# Patient Record
Sex: MALE | Race: WHITE | NOT HISPANIC OR LATINO | Employment: FULL TIME | URBAN - METROPOLITAN AREA
[De-identification: names, ages, dates, MRNs, and addresses within clinical notes are randomized per-mention and may not be internally consistent; named-entity substitution may affect disease eponyms.]

---

## 2021-02-04 ENCOUNTER — APPOINTMENT (OUTPATIENT)
Dept: RADIOLOGY | Facility: MEDICAL CENTER | Age: 41
End: 2021-02-04
Attending: PHYSICIAN ASSISTANT
Payer: COMMERCIAL

## 2021-02-04 ENCOUNTER — OFFICE VISIT (OUTPATIENT)
Dept: URGENT CARE | Facility: MEDICAL CENTER | Age: 41
End: 2021-02-04
Payer: COMMERCIAL

## 2021-02-04 VITALS
RESPIRATION RATE: 18 BRPM | DIASTOLIC BLOOD PRESSURE: 114 MMHG | SYSTOLIC BLOOD PRESSURE: 174 MMHG | TEMPERATURE: 98 F | BODY MASS INDEX: 35.55 KG/M2 | WEIGHT: 240 LBS | HEIGHT: 69 IN | HEART RATE: 84 BPM | OXYGEN SATURATION: 98 %

## 2021-02-04 DIAGNOSIS — S99.922A FOOT INJURY, LEFT, INITIAL ENCOUNTER: ICD-10-CM

## 2021-02-04 DIAGNOSIS — S99.912A LEFT ANKLE INJURY, INITIAL ENCOUNTER: ICD-10-CM

## 2021-02-04 DIAGNOSIS — S82.832A CLOSED FRACTURE OF DISTAL END OF LEFT FIBULA, UNSPECIFIED FRACTURE MORPHOLOGY, INITIAL ENCOUNTER: Primary | ICD-10-CM

## 2021-02-04 PROCEDURE — 73630 X-RAY EXAM OF FOOT: CPT

## 2021-02-04 PROCEDURE — 29515 APPLICATION SHORT LEG SPLINT: CPT | Performed by: PHYSICIAN ASSISTANT

## 2021-02-04 PROCEDURE — 73610 X-RAY EXAM OF ANKLE: CPT

## 2021-02-04 PROCEDURE — 99213 OFFICE O/P EST LOW 20 MIN: CPT | Performed by: PHYSICIAN ASSISTANT

## 2021-02-04 NOTE — PATIENT INSTRUCTIONS
1  Apply ICE to the area 10-15min 3-4x daily  2  Motrin as needed for pain  3   Continue in splint and crutches until consult with Orthopedics

## 2021-02-04 NOTE — PROGRESS NOTES
[address] [facility]        NAME: Kit Helms is a 41 y o  male  : 1980    MRN: [id number]  DATE: 2021  TIME: 5:48 PM    Assessment and Plan   Closed fracture of distal end of left fibula, unspecified fracture morphology, initial encounter [H32 759A]  1  Closed fracture of distal end of left fibula, unspecified fracture morphology, initial encounter  XR ankle 3+ vw left    Ambulatory referral to Orthopedic Surgery   2  Foot injury, left, initial encounter  XR foot 3+ vw left         Patient Instructions     1  Apply ICE to the area 10-15min 3-4x daily  2  Motrin as needed for pain  3  Continue in splint and crutches until consult with Orthopedics         Chief Complaint     Chief Complaint   Patient presents with    Ankle Injury     Pt was shoveling, slipped and fell in his driveway twisting his left ankle and left foot 2 days ago   Foot Injury         History of Present Illness       Rashaun Colunga is a 43-year-old male who presents with pain and swelling of his left ankle after fall occurred 2 days prior  Patient reports he was shoveling snow when he slipped, twisting his left ankle  Patient does report hearing a pop in his left ankle and reports 7/10 pain to the area  Patient denies any numbness, tingling or weakness  He does have a prior history of a left ankle injury  Review of Systems   Review of Systems   Constitutional: Negative  HENT: Negative  Respiratory: Negative  Cardiovascular: Negative  Musculoskeletal: Positive for gait problem and joint swelling  Current Medications     No current outpatient medications on file      Current Allergies     Allergies as of 2021    (No Known Allergies)            The following portions of the patient's history were reviewed and updated as appropriate: allergies, current medications, past family history, past medical history, past social history, past surgical history and problem list      History reviewed  No pertinent past medical history  History reviewed  No pertinent surgical history  Family History   Problem Relation Age of Onset    Hypertension Mother     Hypertension Father          Medications have been verified  Objective   BP (!) 174/114   Pulse 84   Temp 98 °F (36 7 °C) (Temporal)   Resp 18   Ht 5' 9" (1 753 m)   Wt 109 kg (240 lb)   SpO2 98%   BMI 35 44 kg/m²   No LMP for male patient  Physical Exam     Physical Exam  Constitutional:       General: He is not in acute distress  Appearance: Normal appearance  He is not ill-appearing  Cardiovascular:      Rate and Rhythm: Normal rate and regular rhythm  Heart sounds: Normal heart sounds  No murmur  Pulmonary:      Effort: Pulmonary effort is normal       Breath sounds: Normal breath sounds  Musculoskeletal:        Feet:    Neurological:      Mental Status: He is alert  Splint application    Date/Time: 2/4/2021 5:48 PM  Performed by: Grayce Kawasaki, PA-C  Authorized by: Grayce Kawasaki, PA-C   Universal Protocol:  Consent: Verbal consent obtained  Consent given by: patient  Patient understanding: patient states understanding of the procedure being performed  Patient consent: the patient's understanding of the procedure matches consent given  Patient identity confirmed: verbally with patient      Pre-procedure details:     Sensation:  Normal  Procedure details:     Laterality:  Left    Location:  Ankle    Ankle:  L ankle    Splint type:  Short leg    Supplies:  Ortho-Glass, cotton padding and elastic bandage      Patient was fitted for crutches, crutch use reviewed with patient

## 2021-02-17 NOTE — TELEPHONE ENCOUNTER
Spoke to patient  Advised that he should follow the instructions given to him by the doctor he has already seen for this until he sees our doctor tomorrow  Verbalized understanding

## 2021-02-17 NOTE — TELEPHONE ENCOUNTER
Patient sees Dr Cheli Luo  Patient is calling because he is currently in a splint but has a boot from an njury he had in 2010   He would like to know if he can wear the boot instead or does he needs to wait until he sees the dr?        [place]: [phone]

## 2021-02-18 NOTE — TELEPHONE ENCOUNTER
email to admin    Good Morning,    I was contacted by patient Brenda Garcias ([phone]) who cancelled his morning appt with DR Westly Goldmann today due to the weather  Patient asked if he can be accommodated with DR Westly Goldmann for next Thursday 02 25  Patient has a new Minimally displaced fracture at the distal left fibula and wants to be seen in [facility]  Can you help accommodate an appropriate appt based on his injury?     # [phone]

## 2021-02-20 ENCOUNTER — HOSPITAL ENCOUNTER (OUTPATIENT)
Dept: RADIOLOGY | Facility: HOSPITAL | Age: 41
Discharge: HOME | End: 2021-02-20
Payer: COMMERCIAL

## 2021-02-20 VITALS
DIASTOLIC BLOOD PRESSURE: 115 MMHG | HEART RATE: 74 BPM | HEIGHT: 69 IN | SYSTOLIC BLOOD PRESSURE: 168 MMHG | BODY MASS INDEX: 35.44 KG/M2

## 2021-02-20 DIAGNOSIS — S82.832A CLOSED FRACTURE OF DISTAL END OF LEFT FIBULA, UNSPECIFIED FRACTURE MORPHOLOGY, INITIAL ENCOUNTER: ICD-10-CM

## 2021-02-20 DIAGNOSIS — S82.892A CLOSED FRACTURE OF LEFT ANKLE, INITIAL ENCOUNTER: Primary | ICD-10-CM

## 2021-02-20 DIAGNOSIS — S82.892A CLOSED FRACTURE OF LEFT ANKLE, INITIAL ENCOUNTER: ICD-10-CM

## 2021-02-20 PROCEDURE — 73610 X-RAY EXAM OF ANKLE: CPT

## 2021-02-20 PROCEDURE — 99203 OFFICE O/P NEW LOW 30 MIN: CPT | Performed by: PHYSICIAN ASSISTANT

## 2021-02-20 NOTE — PROGRESS NOTES
Assessment/Plan   Diagnoses and all orders for this visit:    Closed fracture of left ankle, initial encounter  - High CAM boot fitted and dispensed  - PWB on crutches  - Follow up with Dr Farshad Tam in a week          Subjective   Patient ID: Clinton Perry is a 41 y o  male  Vitals:    02/20/21 0753   BP: (!) 168/115   Pulse: 76     41yo male comes in for an evaluation of his left ankle  He was injured when he fell in the driveway on 8-3-88 while shoveling snow  Xrays in the ER demonstrated a distal fibular fracture  He was treated with a splint and crutches  The pain is located only on the outside of the ankle  The pain is dull in character, mild in severity, pain does not radiate and is not associated with numbness  Several years ago, he was in a CAM boot due to chronic pain from flat foot  He brought this with him today, but it doesn't fit  Incidentally noted is hypertension  He will call his PCP today  He understands and agrees to go to the ER immediately with any symptoms including chest pain, palpitations, sob, headache, dizziness, or blurred vision  The following portions of the patient's history were reviewed and updated as appropriate: allergies, current medications, past family history, past medical history, past social history, past surgical history and problem list     Review of Systems  Ortho Exam  History reviewed  No pertinent past medical history  History reviewed  No pertinent surgical history  Family History   Problem Relation Age of Onset    Hypertension Mother     Hypertension Father      Social History     Occupational History    Not on file   Tobacco Use    Smoking status: Never Smoker    Smokeless tobacco: Never Used   Substance and Sexual Activity    Alcohol use: Not on file    Drug use: Not on file    Sexual activity: Not on file       Review of Systems   Constitutional: Negative  HENT: Negative  Eyes: Negative  Respiratory: Negative  Cardiovascular: Negative  Gastrointestinal: Negative  Endocrine: Negative  Genitourinary: Negative  Musculoskeletal: As below      Allergic/Immunologic: Negative  Neurological: Negative  Hematological: Negative  Psychiatric/Behavioral: Negative  Objective   Physical Exam        I have personally reviewed pertinent films in PACS and my interpretation is unchanged from previous  Distal fibular fracture, isolated  Mortise intact  · Constitutional: Awake, Alert, Oriented  s  · Eyes: EOMI  · Psych: Mood and affect appropriate  · Heart: regular rate and rhythm  · Lungs: No audible wheezing  · Abdomen: soft  · Lymph: no lymphedema             left ankle:  - Appearance   Mild swelling  No discoloration, deformity, or ecchymosis  - Palpation   + tenderness of the lateral malleolus  Non-tender medial malleolus, deltoid, proximal fibula    Non-tender about the rest of the ankle and foot   - ROM   not examined due to fracture status  - Motor   limited by pain  - NVI distally

## 2021-02-26 ENCOUNTER — OFFICE VISIT (OUTPATIENT)
Dept: OBGYN CLINIC | Facility: CLINIC | Age: 41
End: 2021-02-26
Payer: COMMERCIAL

## 2021-02-26 ENCOUNTER — APPOINTMENT (OUTPATIENT)
Dept: RADIOLOGY | Facility: AMBULARY SURGERY CENTER | Age: 41
End: 2021-02-26
Attending: ORTHOPAEDIC SURGERY
Payer: COMMERCIAL

## 2021-02-26 VITALS — WEIGHT: 240 LBS | BODY MASS INDEX: 35.55 KG/M2 | HEIGHT: 69 IN

## 2021-02-26 DIAGNOSIS — S82.65XA NONDISPLACED FRACTURE OF LATERAL MALLEOLUS OF LEFT FIBULA, INITIAL ENCOUNTER FOR CLOSED FRACTURE: Primary | ICD-10-CM

## 2021-02-26 DIAGNOSIS — S82.65XA NONDISPLACED FRACTURE OF LATERAL MALLEOLUS OF LEFT FIBULA, INITIAL ENCOUNTER FOR CLOSED FRACTURE: ICD-10-CM

## 2021-02-26 PROCEDURE — 99204 OFFICE O/P NEW MOD 45 MIN: CPT | Performed by: ORTHOPAEDIC SURGERY

## 2021-02-26 PROCEDURE — 73610 X-RAY EXAM OF ANKLE: CPT

## 2021-02-26 NOTE — PATIENT INSTRUCTIONS
At this time, he should go back to partial weight bearing (50% of weight) in the cam boot with 1 crutch under the right arm until 3/5/21  After that week, he can transition to full weight bearing with no crutch for the next 2 weeks until 3/19/21  He can do epsom salt soaks for the swelling  Elevate and if can use ant-inflammatory for swelling ( like advil or mortrin, ibuprofen) A referral to formal physical therapy to work on ROM and strengthening which can start next week  We will follow up with the patient in 3 weeks with xrays

## 2021-02-26 NOTE — PROGRESS NOTES
Assessment:  1  Nondisplaced fracture of lateral malleolus of left fibula, initial encounter for closed fracture  XR ankle 3+ vw left    Ambulatory referral to Physical Therapy     There is no problem list on file for this patient  Plan        At this time, he should go back to partial weight bearing (50%) in the cam boot with 1 crutch under the right arm until 3/5/21  After that week, he can transition to full weight bearing with no crutch for the next 2 weeks until 3/19/21   He can do epsom salt soaks for the swelling  A referral to formal physical therapy to work on ROM and strengthening which can start next week  We will follow up with the patient in 3 weeks with xrays  Subjective:     Patient ID:    Chief Complaint:Philippebraulioamanda Rhysjose Johnston 41 y o  male      HPI    Patient comes in today  for consultation with regards to a left distal fibular fracture referred by Yu Castillo physician assistant  Patient states that he fell in his driveway and 1/1/8345 while shoveling snow  He was seen in the ED for evaluation and treatment  He was placed in a splint and on crutches  The patient reports that the pain is in the  Lateral aspect of the ankle and has been going on for since the injury  The pain is rated at3 at its best and6 at its worst   The pain is described as  Dull aching  It is worsened with  Going down stairs, and is made better with  In Cam boot  The patient has taken  Cam boot for treatment  He has had no need to take over-the-counter medications for his pain or discomfort        The following portions of the patient's history were reviewed and updated as appropriate: allergies, current medications, past family history, past social history, past surgical history and problem list         Social History     Socioeconomic History    Marital status: Single     Spouse name: Not on file    Number of children: Not on file    Years of education: Not on file    Highest education level: Not on file Occupational History    Not on file   Social Needs    Financial resource strain: Not on file    Food insecurity     Worry: Not on file     Inability: Not on file    Transportation needs     Medical: Not on file     Non-medical: Not on file   Tobacco Use    Smoking status: Never Smoker    Smokeless tobacco: Never Used   Substance and Sexual Activity    Alcohol use: Not on file    Drug use: Not on file    Sexual activity: Not on file   Lifestyle    Physical activity     Days per week: Not on file     Minutes per session: Not on file    Stress: Not on file   Relationships    Social connections     Talks on phone: Not on file     Gets together: Not on file     Attends Oriental orthodox service: Not on file     Active member of club or organization: Not on file     Attends meetings of clubs or organizations: Not on file     Relationship status: Not on file    Intimate partner violence     Fear of current or ex partner: Not on file     Emotionally abused: Not on file     Physically abused: Not on file     Forced sexual activity: Not on file   Other Topics Concern    Not on file   Social History Narrative    Not on file     No past medical history on file  No past surgical history on file  No Known Allergies  No current outpatient medications on file prior to visit  No current facility-administered medications on file prior to visit  Objective:    Review of Systems   Constitutional: Negative for chills, fever and unexpected weight change  HENT: Negative for hearing loss, nosebleeds and sore throat  Eyes: Negative for pain, redness and visual disturbance  Respiratory: Negative for cough, shortness of breath and wheezing  Cardiovascular: Negative for chest pain, palpitations and leg swelling  Gastrointestinal: Negative for abdominal pain, nausea and vomiting  Endocrine: Negative for polydipsia and polyuria  Genitourinary: Negative for dysuria and hematuria     Skin: Negative for rash and wound  Neurological: Negative for dizziness, light-headedness and headaches  Psychiatric/Behavioral: Negative for decreased concentration, dysphoric mood and suicidal ideas  The patient is not nervous/anxious  Left Ankle Exam     Tenderness   The patient is experiencing tenderness in the lateral malleolus, ATF and CF  Swelling: mild    Range of Motion   Dorsiflexion: 0   Plantar flexion: 20   Eversion: 5   Inversion: 5     Muscle Strength   Dorsiflexion:  5/5   Plantar flexion:  5/5   Posterior tibial:  4/5  Peroneal muscle:  4/5    Tests   Anterior drawer: negative    Other   Erythema: absent  Sensation: normal  Pulse: present    Comments:    Calf is soft and non tender    + 2 pitted edema over the dorsal aspect of the foot   1+ pitted edema over the lateral ankle            Physical Exam  Vitals signs reviewed  HENT:      Head: Normocephalic  Eyes:      Pupils: Pupils are equal, round, and reactive to light  Neck:      Musculoskeletal: Normal range of motion  Cardiovascular:      Rate and Rhythm: Normal rate  Pulses: Normal pulses  Skin:     General: Skin is warm and dry  Neurological:      Mental Status: He is alert and oriented to person, place, and time           Procedures  No Procedures performed today    I have personally reviewed pertinent films in PACS and my interpretation is X-rays of the left ankle were reviewed from 02/20/2021:     Manan Armas,:  Kaiden Resendiz am acting as a scribe while in the presence of the attending physician :       I,:  Neyda Kellogg, DO personally performed the services described in this documentation    as scribed in my presence :           Portions of the record may have been created with voice recognition software   Occasional wrong word or "sound a like" substitutions may have occurred due to the inherent limitations of voice recognition software   Read the chart carefully and recognize, using context, where substitutions have occurred

## 2021-03-02 ENCOUNTER — EVALUATION (OUTPATIENT)
Dept: PHYSICAL THERAPY | Age: 41
End: 2021-03-02
Payer: COMMERCIAL

## 2021-03-02 DIAGNOSIS — S82.65XD NONDISPLACED FRACTURE OF LATERAL MALLEOLUS OF LEFT FIBULA, SUBSEQUENT ENCOUNTER FOR CLOSED FRACTURE WITH ROUTINE HEALING: Primary | ICD-10-CM

## 2021-03-02 PROCEDURE — 97161 PT EVAL LOW COMPLEX 20 MIN: CPT | Performed by: PHYSICAL THERAPIST

## 2021-03-02 PROCEDURE — 97110 THERAPEUTIC EXERCISES: CPT | Performed by: PHYSICAL THERAPIST

## 2021-03-03 NOTE — PROGRESS NOTES
PT Evaluation     Today's date: 3/2/2021  Patient name: Sigifredo Dixon  : 1980  MRN: [id number]  Referring provider: Isabella Lara DO  Dx:   Encounter Diagnosis     ICD-10-CM    1  Nondisplaced fracture of lateral malleolus of left fibula, subsequent encounter for closed fracture with routine healing  S82 65XD Ambulatory referral to Physical Therapy       Start Time:   Stop Time:   Total time in clinic (min): 45 minutes    Assessment  Assessment details: Pt is a 42 y/o male who presents with left ankle pain following non-displaced lateral malleolar fx  No further referral is necessary at this time  Pt has pain, decreased strength, and ROM  Pt has a good outlook on PT and no PMH leading to a positive prognosis  Pt would benefit from PT to address these impairments leading to increased functional capacity and improved quality of life  Impairments: abnormal or restricted ROM, impaired physical strength, lacks appropriate home exercise program, pain with function, poor posture  and poor body mechanics  Understanding of Dx/Px/POC: good   Prognosis: good    Goals  Short Term Goals: to be achieved by 4 weeks  1) Patient to be independent with basic HEP  2) Decrease pain to 1/10 at its worst   3) Increase DF ROM by 5-10 degrees   4) Increase LE strength by 1/2 MMT grade in all deficient planes      Long Term Goals: to be achieved by discharge  1) FOTO equal to or greater than 70   2) Ambulation to improve to maximal level of function  3) Stair negotiation will improve to reciprocal   4) Sit to stand transfers will improve to maximal level of function     Plan  Patient would benefit from: skilled physical therapy  Planned modality interventions: cryotherapy and thermotherapy: hydrocollator packs  Planned therapy interventions: neuromuscular re-education, patient education, stretching, strengthening, therapeutic activities, therapeutic exercise, therapeutic training, home exercise program and graded activity  Frequency: Twice a week for 8 weeks  Treatment plan discussed with: patient        Subjective Evaluation    History of Present Illness  Mechanism of injury: trauma  Mechanism of injury: Pt slipped on ice in February and fractured lateral malleolus  Detailed return to weight bearing program is outlined in Dr Nova Bahena note  Pt would like to return to walking 2 miles  Pt has no significant PMH  Quality of life: good    Pain  Current pain ratin  At best pain ratin  At worst pain ratin  Quality: dull ache  Aggravating factors: stair climbing, walking and standing    Hand dominance: right    Patient Goals  Patient goals for therapy: decreased pain, independence with ADLs/IADLs, return to sport/leisure activities, increased strength and increased motion          Objective     General Comments: Ankle/Foot Comments   Left Ankle Exam      Tenderness   The patient is experiencing tenderness in the lateral malleolus, ATF and CF  Swelling: mild     Range of Motion   Dorsiflexion: 0   Plantar flexion: 20   Eversion: 5   Inversion: 5      Muscle Strength   Dorsiflexion:  4-/5   Plantar flexion:  5/5   Posterior tibial:  4-/5  Peroneal muscle:  4-/5     Tests   Anterior drawer: negative     Other   Erythema: absent  Sensation: normal  Pulse: present     Comments:    Calf is soft and non tender     + 2 pitted edema over the dorsal aspect of the foot   1+ pitted edema over the lateral ankle    Gait:  Patient is able to ambulate household distances with CAM and no AD         Flowsheet Rows      Most Recent Value   PT/OT G-Codes   Current Score  51   Projected Score  72             Precautions: N/A      Manuals                                                                 Neuro Re-Ed                                                                                                        Ther Ex 3/2            Ankle pumps HEP            Toe crunches HEP            DF stretch HEP Ther Activity                                       Gait Training                                       Modalities

## 2021-03-09 ENCOUNTER — OFFICE VISIT (OUTPATIENT)
Dept: PHYSICAL THERAPY | Age: 41
End: 2021-03-09
Payer: COMMERCIAL

## 2021-03-09 DIAGNOSIS — S82.65XD NONDISPLACED FRACTURE OF LATERAL MALLEOLUS OF LEFT FIBULA, SUBSEQUENT ENCOUNTER FOR CLOSED FRACTURE WITH ROUTINE HEALING: Primary | ICD-10-CM

## 2021-03-09 PROCEDURE — 97140 MANUAL THERAPY 1/> REGIONS: CPT | Performed by: PHYSICAL THERAPIST

## 2021-03-09 PROCEDURE — 97110 THERAPEUTIC EXERCISES: CPT | Performed by: PHYSICAL THERAPIST

## 2021-03-10 NOTE — PROGRESS NOTES
Daily Note     Today's date: 3/9/2021  Patient name: Kraig Gomez  : 1980  MRN: [id number]  Referring provider: Ti Mathis DO  Dx:   Encounter Diagnosis     ICD-10-CM    1  Nondisplaced fracture of lateral malleolus of left fibula, subsequent encounter for closed fracture with routine healing  S82 65XD        Start Time: 1800  Stop Time: 1850  Total time in clinic (min): 50 minutes    Subjective: Pt reports improvements with symptoms  Objective: See treatment diary below      Assessment: Tolerated treatment well  Patient demonstrated fatigue post treatment and would benefit from continued PT      Plan: Continue per plan of care        Precautions: N/A      Manuals 3/9            Manual edema  JF            PROM JF                                      Neuro Re-Ed                                                                                                        Ther Ex 3/9            Ankle pumps 3*10            Toe crunches 3*10            DF stretch 20*10"            SLR 3x prone supine  + SL 3*10                                                                Ther Activity                                       Gait Training                                       Modalities

## 2021-03-16 ENCOUNTER — OFFICE VISIT (OUTPATIENT)
Dept: PHYSICAL THERAPY | Age: 41
End: 2021-03-16
Payer: COMMERCIAL

## 2021-03-16 DIAGNOSIS — S82.65XD NONDISPLACED FRACTURE OF LATERAL MALLEOLUS OF LEFT FIBULA, SUBSEQUENT ENCOUNTER FOR CLOSED FRACTURE WITH ROUTINE HEALING: Primary | ICD-10-CM

## 2021-03-16 PROCEDURE — 97140 MANUAL THERAPY 1/> REGIONS: CPT | Performed by: PHYSICAL THERAPIST

## 2021-03-16 PROCEDURE — 97112 NEUROMUSCULAR REEDUCATION: CPT | Performed by: PHYSICAL THERAPIST

## 2021-03-16 PROCEDURE — 97110 THERAPEUTIC EXERCISES: CPT | Performed by: PHYSICAL THERAPIST

## 2021-03-16 NOTE — PROGRESS NOTES
Daily Note     Today's date: 3/16/2021  Patient name: Kathya Jean  : 1980  MRN: [id number]  Referring provider: Shanda Cornejo DO  Dx:   Encounter Diagnosis     ICD-10-CM    1  Nondisplaced fracture of lateral malleolus of left fibula, subsequent encounter for closed fracture with routine healing  S82 65XD        Start Time: 1800  Stop Time: 1845  Total time in clinic (min): 45 minutes    Subjective: Pt reports improvements in symptoms  Objective: See treatment diary below      Assessment: Tolerated treatment well  Pt demonstrates great improvements in ROM and has near 15-20 degrees~ of  PROM/AROM DF and nearly full PF  4-/5 MMT with  Inv/Ev and DF  Pt's demonstrates ability to affectively manage swelling and appropriate gait without AD in CAM boot  Patient demonstrated fatigue post treatment and would benefit from continued PT      Plan: Continue per plan of care        Precautions: N/A      Manuals 3/9 3/16           Manual edema  JF JF           PROM JF JF                                     Neuro Re-Ed  3/16           Mini squats  3*10           Standing hip abd  2*10 b/l           STS  2x10                                                               Ther Ex 3/9 3/16           Ankle pumps 3*10 3*10           Toe crunches 3*10            DF stretch 20*10" 20*10"           SLR 3x prone supine  + SL 3*10 3*10                                                               Ther Activity                                       Gait Training                                       Modalities

## 2021-03-19 ENCOUNTER — OFFICE VISIT (OUTPATIENT)
Dept: OBGYN CLINIC | Facility: CLINIC | Age: 41
End: 2021-03-19
Payer: COMMERCIAL

## 2021-03-19 ENCOUNTER — APPOINTMENT (OUTPATIENT)
Dept: RADIOLOGY | Facility: AMBULARY SURGERY CENTER | Age: 41
End: 2021-03-19
Attending: ORTHOPAEDIC SURGERY
Payer: COMMERCIAL

## 2021-03-19 VITALS
HEIGHT: 69 IN | WEIGHT: 240 LBS | HEART RATE: 72 BPM | BODY MASS INDEX: 35.55 KG/M2 | DIASTOLIC BLOOD PRESSURE: 116 MMHG | SYSTOLIC BLOOD PRESSURE: 167 MMHG

## 2021-03-19 DIAGNOSIS — S82.65XA NONDISPLACED FRACTURE OF LATERAL MALLEOLUS OF LEFT FIBULA, INITIAL ENCOUNTER FOR CLOSED FRACTURE: ICD-10-CM

## 2021-03-19 DIAGNOSIS — S82.62XD CLOSED LOW LATERAL MALLEOLUS FRACTURE, LEFT, WITH ROUTINE HEALING, SUBSEQUENT ENCOUNTER: Primary | ICD-10-CM

## 2021-03-19 PROCEDURE — 99213 OFFICE O/P EST LOW 20 MIN: CPT | Performed by: ORTHOPAEDIC SURGERY

## 2021-03-19 PROCEDURE — 73610 X-RAY EXAM OF ANKLE: CPT

## 2021-03-19 NOTE — PROGRESS NOTES
Assessment/Plan:  1  Closed low lateral malleolus fracture, left, with routine healing, subsequent encounter  XR ankle 3+ vw left     Patient Active Problem List   Diagnosis    Closed low lateral malleolus fracture, left, with routine healing, subsequent encounter       Discussion/Summary:    41 y o  male With healing [place] a fracture of the left  Lateral malleolus  Patient will be transitioned out of the Cam boot into normal footwear  He will continue physical therapy  Recommended elevation and Epsom salt soaks for swelling as needed  Follow-up in 4 weeks for repeat x-ray of the left ankle      The assessment and plan were formulated by Dr Sotero Trevino and I assisted in carrying it out  Subjective:   Patient ID: Hedy Valente is a 41 y o  male   HPI    Patient presents to the office for follow up of  Left distal fibula fracture of lateral malleolus  Since the last visit, the patient reports  No significant pain in the left ankle  Has some mild discomfort that is intermittent over the lateral ankle  Denies any radiating pain  No significant pain with weight-bearing  He has been using the cam walker boot as instructed  Denies any numbness or tingling  Patient  denies any new injuries to the  Left ankle since the last visit       The following portions of the patient's history were reviewed and updated as appropriate: allergies, current medications, past family history, past social history, past surgical history and problem list     Social History     Socioeconomic History    Marital status: Single     Spouse name: Not on file    Number of children: Not on file    Years of education: Not on file    Highest education level: Not on file   Occupational History    Not on file   Social Needs    Financial resource strain: Not on file    Food insecurity     Worry: Not on file     Inability: Not on file    Transportation needs     Medical: Not on file     Non-medical: Not on file   Tobacco Use    Smoking status: Never Smoker    Smokeless tobacco: Never Used   Substance and Sexual Activity    Alcohol use: Not on file    Drug use: Not on file    Sexual activity: Not on file   Lifestyle    Physical activity     Days per week: Not on file     Minutes per session: Not on file    Stress: Not on file   Relationships    Social connections     Talks on phone: Not on file     Gets together: Not on file     Attends Anglican service: Not on file     Active member of club or organization: Not on file     Attends meetings of clubs or organizations: Not on file     Relationship status: Not on file    Intimate partner violence     Fear of current or ex partner: Not on file     Emotionally abused: Not on file     Physically abused: Not on file     Forced sexual activity: Not on file   Other Topics Concern    Not on file   Social History Narrative    Not on file     History reviewed  No pertinent past medical history  History reviewed  No pertinent surgical history  No Known Allergies  No current outpatient medications on file prior to visit  No current facility-administered medications on file prior to visit  Review of Systems   negative except as noted in HPI    Objective:    Vitals:    03/19/21 1308   BP: (!) 167/116   Pulse: 72       Physical Exam  Constitutional:       General: He is not in acute distress  Appearance: He is well-developed  HENT:      Head: Normocephalic and atraumatic  Eyes:      General: No scleral icterus  Conjunctiva/sclera: Conjunctivae normal    Neck:      Musculoskeletal: Neck supple  Trachea: No tracheal deviation  Cardiovascular:      Comments: No discernible arrhthymias   Pulmonary:      Effort: Pulmonary effort is normal  No respiratory distress  Skin:     General: Skin is warm and dry  Neurological:      Mental Status: He is alert     Psychiatric:         Behavior: Behavior normal          Left Ankle Exam     Tenderness   The patient is experiencing no tenderness  Swelling: moderate    Range of Motion   Dorsiflexion: normal   Plantar flexion: normal   Eversion: abnormal   Inversion: abnormal     Muscle Strength   Left ankle normal muscle strength: FHL and EHL 5/5  Dorsiflexion:  5/5   Plantar flexion:  5/5   Anterior tibial:  5/5   Posterior tibial:  5/5  Gastrocsoleus:  5/5  Peroneal muscle:  5/5    Tests   Anterior drawer: negative    Other   Erythema: absent  Scars: absent  Sensation: normal  Left ankle pulse absent: 2+ DP pulse  I have personally reviewed pertinent films in PACS and my interpretation is  x-ray left ankle performed today demonstrates stable alignment of nondisplaced lateral malleolus fracture with evidence of interval callus formation seen moderate soft tissue swelling  Procedures  No Procedures performed today    Portions of the record may have been created with voice recognition software  Occasional wrong word or "sound a like" substitutions may have occurred due to the inherent limitations of voice recognition software  Read the chart carefully and recognize, using context, where substitutions have occurred

## 2021-03-23 ENCOUNTER — OFFICE VISIT (OUTPATIENT)
Dept: PHYSICAL THERAPY | Age: 41
End: 2021-03-23
Payer: COMMERCIAL

## 2021-03-23 DIAGNOSIS — S82.65XD NONDISPLACED FRACTURE OF LATERAL MALLEOLUS OF LEFT FIBULA, SUBSEQUENT ENCOUNTER FOR CLOSED FRACTURE WITH ROUTINE HEALING: Primary | ICD-10-CM

## 2021-03-23 PROCEDURE — 97140 MANUAL THERAPY 1/> REGIONS: CPT | Performed by: PHYSICAL THERAPIST

## 2021-03-23 PROCEDURE — 97112 NEUROMUSCULAR REEDUCATION: CPT | Performed by: PHYSICAL THERAPIST

## 2021-03-23 PROCEDURE — 97110 THERAPEUTIC EXERCISES: CPT | Performed by: PHYSICAL THERAPIST

## 2021-03-23 NOTE — PROGRESS NOTES
Daily Note     Today's date: 3/23/2021  Patient name: Kenya Walsh  : 1980  MRN: [id number]  Referring provider: Lake Blum DO  Dx:   Encounter Diagnosis     ICD-10-CM    1  Nondisplaced fracture of lateral malleolus of left fibula, subsequent encounter for closed fracture with routine healing  S82 65XD        Start Time: 1800  Stop Time: 1845  Total time in clinic (min): 45 minutes    Subjective: Pt reports improvements      Objective: See treatment diary below      Assessment: Tolerated treatment well  Patient demonstrated fatigue post treatment and would benefit from continued PT      Plan: Continue per plan of care        Precautions: N/A      Manuals 3/9 3/23           Manual edema  JF JF           PROM JF JF                                     Neuro Re-Ed  3/23           Mini squats  3*10           Standing hip abd  2*10 b/l           STS  2x10                                                               Ther Ex 3/9 3/23           Ankle pumps 3*10            Toe crunches 3*10            DF stretch 20*10" 20*10"           SLR 3x prone supine  + SL 3*10 3*10                                                               Ther Activity                                       Gait Training  3/23           TM  5'           Weight shifts  5'           Modalities

## 2021-03-29 ENCOUNTER — OFFICE VISIT (OUTPATIENT)
Dept: PHYSICAL THERAPY | Age: 41
End: 2021-03-29
Payer: COMMERCIAL

## 2021-03-29 DIAGNOSIS — S82.65XD NONDISPLACED FRACTURE OF LATERAL MALLEOLUS OF LEFT FIBULA, SUBSEQUENT ENCOUNTER FOR CLOSED FRACTURE WITH ROUTINE HEALING: Primary | ICD-10-CM

## 2021-03-29 PROCEDURE — 97110 THERAPEUTIC EXERCISES: CPT | Performed by: PHYSICAL THERAPIST

## 2021-03-29 PROCEDURE — 97112 NEUROMUSCULAR REEDUCATION: CPT | Performed by: PHYSICAL THERAPIST

## 2021-03-29 PROCEDURE — 97140 MANUAL THERAPY 1/> REGIONS: CPT | Performed by: PHYSICAL THERAPIST

## 2021-03-29 NOTE — PROGRESS NOTES
Daily Note     Today's date: 3/29/2021  Patient name: Kusum Madrigal  : 1980  MRN: [id number]  Referring provider: Elva Lantigua DO  Dx:   Encounter Diagnosis     ICD-10-CM    1  Nondisplaced fracture of lateral malleolus of left fibula, subsequent encounter for closed fracture with routine healing  S82 65XD        Start Time: 1800  Stop Time: 1845  Total time in clinic (min): 45 minutes    Subjective: Pt reports wearing sneaker 80-90% of the day  Objective: See treatment diary below      Assessment: Tolerated treatment well  Pt continues to progress favorably  Patient demonstrated fatigue post treatment and would benefit from continued PT      Plan: Continue per plan of care        Precautions: N/A      Manuals 3/9 3/23 3/29          Manual edema  JF JF JF          PROM JF JF JF                                    Neuro Re-Ed  3/23 3/29          Mini squats  3*10 3*10          Standing hip abd  2*10 b/l 2*10          STS  2x10           Heel toe raises   2*10          TB 4 way   30x ea                                    Ther Ex 3/9 3/23 3/29          Ankle pumps 3*10            Toe crunches 3*10            DF stretch 20*10" 20*10" standing 20x10"          SLR 3x prone supine  + SL 3*10 3*10           Upright bike   8'                                                 Ther Activity                                       Gait Training  3/23           TM  5'           Weight shifts  5'           Modalities

## 2021-04-05 ENCOUNTER — OFFICE VISIT (OUTPATIENT)
Dept: PHYSICAL THERAPY | Age: 41
End: 2021-04-05
Payer: COMMERCIAL

## 2021-04-05 DIAGNOSIS — S82.65XD NONDISPLACED FRACTURE OF LATERAL MALLEOLUS OF LEFT FIBULA, SUBSEQUENT ENCOUNTER FOR CLOSED FRACTURE WITH ROUTINE HEALING: Primary | ICD-10-CM

## 2021-04-05 PROCEDURE — 97112 NEUROMUSCULAR REEDUCATION: CPT | Performed by: PHYSICAL THERAPIST

## 2021-04-05 PROCEDURE — 97140 MANUAL THERAPY 1/> REGIONS: CPT | Performed by: PHYSICAL THERAPIST

## 2021-04-05 PROCEDURE — 97110 THERAPEUTIC EXERCISES: CPT | Performed by: PHYSICAL THERAPIST

## 2021-04-05 NOTE — PROGRESS NOTES
Daily Note     Today's date: 2021  Patient name: Juan Belle  : 1980  MRN: [id number]  Referring provider: Foster Gonzales DO  Dx:   Encounter Diagnosis     ICD-10-CM    1  Nondisplaced fracture of lateral malleolus of left fibula, subsequent encounter for closed fracture with routine healing  S82 65XD        Start Time: 1800  Stop Time: 1845  Total time in clinic (min): 45 minutes    Subjective: Pt reports improvements with symptoms  Objective: See treatment diary below      Assessment: Tolerated treatment well  Pt's POC was progressed to include more SLS  Patient demonstrated fatigue post treatment and would benefit from continued PT      Plan: Continue per plan of care        Precautions: N/A      Manuals 3/9 3/23 4          Manual edema  JF JF JF          PROM JF JF JF                                    Neuro Re-Ed  3/23 45          Mini squats  3*10 3*10          Standing hip abd  2*10 b/l 2*10          STS  2x10           Heel toe raises   3*10          TB 4 way   30x ea                                    Ther Ex 3/9 3/23 4/5          Ankle pumps 3*10            Toe crunches 3*10            DF stretch 20*10" 20*10" standing 20x10"          SLR 3x prone supine  + SL 3*10 3*10           Upright bike   8'                                                 Ther Activity                                       Gait Training  3/23           TM  5'           Weight shifts  5'           Modalities

## 2021-04-12 ENCOUNTER — APPOINTMENT (OUTPATIENT)
Dept: PHYSICAL THERAPY | Age: 41
End: 2021-04-12
Payer: COMMERCIAL

## 2021-04-15 ENCOUNTER — OFFICE VISIT (OUTPATIENT)
Dept: PHYSICAL THERAPY | Age: 41
End: 2021-04-15
Payer: COMMERCIAL

## 2021-04-15 DIAGNOSIS — S82.65XD NONDISPLACED FRACTURE OF LATERAL MALLEOLUS OF LEFT FIBULA, SUBSEQUENT ENCOUNTER FOR CLOSED FRACTURE WITH ROUTINE HEALING: Primary | ICD-10-CM

## 2021-04-15 PROCEDURE — 97112 NEUROMUSCULAR REEDUCATION: CPT | Performed by: PHYSICAL THERAPIST

## 2021-04-15 PROCEDURE — 97110 THERAPEUTIC EXERCISES: CPT | Performed by: PHYSICAL THERAPIST

## 2021-04-15 PROCEDURE — 97140 MANUAL THERAPY 1/> REGIONS: CPT | Performed by: PHYSICAL THERAPIST

## 2021-04-15 NOTE — PROGRESS NOTES
Floor PT Discharge Summary      Today's date: 4/15/2021  Patient name: Carmelo Simms  : 1980  MRN: [id number]  Referring provider: Altagracia Anthony DO  Dx:   Encounter Diagnosis     ICD-10-CM    1  Nondisplaced fracture of lateral malleolus of left fibula, subsequent encounter for closed fracture with routine healing  S82 65XD Ambulatory referral to Physical Therapy       Start Time: 1800  Stop Time: 1845  Total time in clinic (min): 45 minutes    Assessment  Assessment details: Pt is a 40 y/o male who presents with left ankle pain following non-displaced lateral malleolar fx  Pt has met all functional goals  He reports being able to walk over a mile easily and demonstrates no difficulty with stair navigation  Pt does demonstrate low-moderate grade swelling and was educated on swelling management techniques  Pt has a 50 dollar co-pay and elects to perform a HEP which I think it appropriate at this point in his recovery  Pt would benefit from PT to address these impairments leading to increased functional capacity and improved quality of life  Impairments: abnormal or restricted ROM, impaired physical strength, lacks appropriate home exercise program, pain with function, poor posture  and poor body mechanics  Understanding of Dx/Px/POC: good   Prognosis: good    Goals  Short Term Goals: to be achieved by 4 weeks MET   1) Patient to be independent with basic HEP  2) Decrease pain to 1/10 at its worst   3) Increase DF ROM by 5-10 degrees   4) Increase LE strength by 1/2 MMT grade in all deficient planes      Long Term Goals: to be achieved by discharge MET  1) FOTO equal to or greater than 70   2) Ambulation to improve to maximal level of function  3) Stair negotiation will improve to reciprocal   4) Sit to stand transfers will improve to maximal level of function     Plan  Patient would benefit from: D/C       Subjective Evaluation    History of Present Illness  Mechanism of injury: trauma  Mechanism of injury: Pt slipped on ice in February and fractured lateral malleolus  Detailed return to weight bearing program is outlined in Dr Tam Oas note  Pt would like to return to walking 2 miles  Pt has no significant PMH  Quality of life: good    Pain  Current pain ratin  At best pain ratin  At worst pain ratin  Quality: dull ache  Aggravating factors: stair climbing, walking and standing    Hand dominance: right    Patient Goals  Patient goals for therapy: decreased pain, independence with ADLs/IADLs, return to sport/leisure activities, increased strength and increased motion          Objective     General Comments: Ankle/Foot Comments   Left Ankle Exam      Tenderness   The patient is experiencing tenderness in the lateral malleolus, ATF and CF     Swelling: mild     Range of Motion   Dorsiflexion: 15  Plantar flexion: 40  Eversion: 10  Inversion: 20     Muscle Strength   Dorsiflexion: 5/5   Plantar flexion:  5/5   Posterior tibial:  4+/5  Peroneal muscle:  4+/5       Flowsheet Rows      Most Recent Value   PT/OT G-Codes   Current Score  77   Projected Score  72             Precautions: N/A      Manuals 3/9 3/23 4/15          Manual edema  JF JF JF          PROM JF JF JF                                    Neuro Re-Ed  3/23 4/15          Mini squats  3*10 3*10          Standing hip abd  2*10 b/l 2*10          STS  2x10           Heel toe raises   3*10          TB 4 way   30x ea                                    Ther Ex 3/9 3/23 4/15          Ankle pumps 3*10            Toe crunches 3*10            DF stretch 20*10" 20*10" standing 20x10"          SLR 3x prone supine  + SL 3*10 3*10           Upright bike   8'                                                 Ther Activity                                       Gait Training  3/23           TM  5'           Weight shifts  5'           Modalities

## 2021-04-23 ENCOUNTER — OFFICE VISIT (OUTPATIENT)
Dept: OBGYN CLINIC | Facility: CLINIC | Age: 41
End: 2021-04-23
Payer: COMMERCIAL

## 2021-04-23 ENCOUNTER — APPOINTMENT (OUTPATIENT)
Dept: RADIOLOGY | Facility: AMBULARY SURGERY CENTER | Age: 41
End: 2021-04-23
Attending: ORTHOPAEDIC SURGERY
Payer: COMMERCIAL

## 2021-04-23 VITALS
DIASTOLIC BLOOD PRESSURE: 104 MMHG | SYSTOLIC BLOOD PRESSURE: 169 MMHG | WEIGHT: 240 LBS | HEIGHT: 69 IN | HEART RATE: 70 BPM | BODY MASS INDEX: 35.55 KG/M2

## 2021-04-23 DIAGNOSIS — S82.62XD CLOSED LOW LATERAL MALLEOLUS FRACTURE, LEFT, WITH ROUTINE HEALING, SUBSEQUENT ENCOUNTER: ICD-10-CM

## 2021-04-23 DIAGNOSIS — S82.62XD CLOSED LOW LATERAL MALLEOLUS FRACTURE, LEFT, WITH ROUTINE HEALING, SUBSEQUENT ENCOUNTER: Primary | ICD-10-CM

## 2021-04-23 PROCEDURE — 73610 X-RAY EXAM OF ANKLE: CPT

## 2021-04-23 PROCEDURE — 99212 OFFICE O/P EST SF 10 MIN: CPT | Performed by: ORTHOPAEDIC SURGERY

## 2021-04-23 NOTE — PROGRESS NOTES
Assessment:  1  Closed low lateral malleolus fracture, left, with routine healing, subsequent encounter  XR ankle 3+ vw left     Patient Active Problem List   Diagnosis    Closed low lateral malleolus fracture, left, with routine healing, subsequent encounter           Plan       activity as tolerated no follow-up necessary            Subjective:     Patient ID:    Chief Complaint:Russell Dover 41 y o  male      HPI     patient comes in today with regards to lateral malleolus fracture    Patient had the injury back in February he has been doing physical therapy he reports no pain he reports that his ankle feels back to normal      The following portions of the patient's history were reviewed and updated as appropriate: allergies, current medications, past family history, past social history, past surgical history and problem list     All organ systems normal    Social History     Socioeconomic History    Marital status: Single     Spouse name: Not on file    Number of children: Not on file    Years of education: Not on file    Highest education level: Not on file   Occupational History    Not on file   Social Needs    Financial resource strain: Not on file    Food insecurity     Worry: Not on file     Inability: Not on file    Transportation needs     Medical: Not on file     Non-medical: Not on file   Tobacco Use    Smoking status: Never Smoker    Smokeless tobacco: Never Used   Substance and Sexual Activity    Alcohol use: Not on file    Drug use: Not on file    Sexual activity: Not on file   Lifestyle    Physical activity     Days per week: Not on file     Minutes per session: Not on file    Stress: Not on file   Relationships    Social connections     Talks on phone: Not on file     Gets together: Not on file     Attends Confucianism service: Not on file     Active member of club or organization: Not on file     Attends meetings of clubs or organizations: Not on file     Relationship status: Not on file    Intimate partner violence     Fear of current or ex partner: Not on file     Emotionally abused: Not on file     Physically abused: Not on file     Forced sexual activity: Not on file   Other Topics Concern    Not on file   Social History Narrative    Not on file     No past medical history on file  No past surgical history on file  No Known Allergies  No current outpatient medications on file prior to visit  No current facility-administered medications on file prior to visit  Objective:        Ortho Exam   full range of motion normal strength and balance    I have personally reviewed pertinent films in PACS and my interpretation is Excellent healing barely visible fracture line  Portions of the record may have been created with voice recognition software   Occasional wrong word or "sound a like" substitutions may have occurred due to the inherent limitations of voice recognition software   Read the chart carefully and recognize, using context, where substitutions have occurred

## 2021-12-10 ENCOUNTER — OFFICE VISIT (OUTPATIENT)
Dept: INTERNAL MEDICINE CLINIC | Age: 41
End: 2021-12-10
Payer: COMMERCIAL

## 2021-12-10 VITALS
DIASTOLIC BLOOD PRESSURE: 110 MMHG | TEMPERATURE: 98.7 F | BODY MASS INDEX: 36.68 KG/M2 | HEART RATE: 76 BPM | OXYGEN SATURATION: 97 % | WEIGHT: 262 LBS | HEIGHT: 71 IN | SYSTOLIC BLOOD PRESSURE: 162 MMHG

## 2021-12-10 DIAGNOSIS — Z23 NEED FOR INFLUENZA VACCINATION: ICD-10-CM

## 2021-12-10 DIAGNOSIS — R35.89 POLYURIA: ICD-10-CM

## 2021-12-10 DIAGNOSIS — Z83.3 FAMILY HISTORY OF DIABETES MELLITUS (DM): ICD-10-CM

## 2021-12-10 DIAGNOSIS — Z00.00 ANNUAL PHYSICAL EXAM: ICD-10-CM

## 2021-12-10 DIAGNOSIS — I10 UNCONTROLLED STAGE 2 HYPERTENSION: Primary | ICD-10-CM

## 2021-12-10 DIAGNOSIS — R63.1 POLYDIPSIA: ICD-10-CM

## 2021-12-10 PROCEDURE — 3725F SCREEN DEPRESSION PERFORMED: CPT | Performed by: INTERNAL MEDICINE

## 2021-12-10 PROCEDURE — 90686 IIV4 VACC NO PRSV 0.5 ML IM: CPT

## 2021-12-10 PROCEDURE — 99203 OFFICE O/P NEW LOW 30 MIN: CPT | Performed by: INTERNAL MEDICINE

## 2021-12-10 PROCEDURE — 3008F BODY MASS INDEX DOCD: CPT | Performed by: INTERNAL MEDICINE

## 2021-12-10 PROCEDURE — 90471 IMMUNIZATION ADMIN: CPT

## 2021-12-10 RX ORDER — LOSARTAN POTASSIUM 50 MG/1
50 TABLET ORAL DAILY
Qty: 60 TABLET | Refills: 2 | Status: SHIPPED | OUTPATIENT
Start: 2021-12-10 | End: 2022-01-17

## 2022-01-17 ENCOUNTER — APPOINTMENT (OUTPATIENT)
Dept: LAB | Age: 42
End: 2022-01-17
Payer: COMMERCIAL

## 2022-01-17 ENCOUNTER — OFFICE VISIT (OUTPATIENT)
Dept: INTERNAL MEDICINE CLINIC | Age: 42
End: 2022-01-17
Payer: COMMERCIAL

## 2022-01-17 VITALS
DIASTOLIC BLOOD PRESSURE: 90 MMHG | HEIGHT: 69 IN | WEIGHT: 260.6 LBS | SYSTOLIC BLOOD PRESSURE: 132 MMHG | BODY MASS INDEX: 38.6 KG/M2 | OXYGEN SATURATION: 96 % | TEMPERATURE: 98.7 F | HEART RATE: 99 BPM

## 2022-01-17 DIAGNOSIS — I10 UNCONTROLLED STAGE 2 HYPERTENSION: ICD-10-CM

## 2022-01-17 DIAGNOSIS — Z00.00 ANNUAL PHYSICAL EXAM: ICD-10-CM

## 2022-01-17 DIAGNOSIS — Z83.3 FAMILY HISTORY OF DIABETES MELLITUS (DM): ICD-10-CM

## 2022-01-17 DIAGNOSIS — R35.89 POLYURIA: ICD-10-CM

## 2022-01-17 DIAGNOSIS — Z76.89 ENCOUNTER TO ESTABLISH CARE: Primary | ICD-10-CM

## 2022-01-17 DIAGNOSIS — R63.1 POLYDIPSIA: ICD-10-CM

## 2022-01-17 LAB
ALBUMIN SERPL BCP-MCNC: 3.9 G/DL (ref 3.5–5)
ALP SERPL-CCNC: 81 U/L (ref 46–116)
ALT SERPL W P-5'-P-CCNC: 115 U/L (ref 12–78)
ANION GAP SERPL CALCULATED.3IONS-SCNC: 4 MMOL/L (ref 4–13)
AST SERPL W P-5'-P-CCNC: 50 U/L (ref 5–45)
BACTERIA UR QL AUTO: ABNORMAL /HPF
BASOPHILS # BLD AUTO: 0.07 THOUSANDS/ΜL (ref 0–0.1)
BASOPHILS NFR BLD AUTO: 1 % (ref 0–1)
BILIRUB SERPL-MCNC: 0.71 MG/DL (ref 0.2–1)
BILIRUB UR QL STRIP: NEGATIVE
BUN SERPL-MCNC: 14 MG/DL (ref 5–25)
CALCIUM SERPL-MCNC: 9.7 MG/DL (ref 8.3–10.1)
CAOX CRY URNS QL MICRO: ABNORMAL /HPF
CHLORIDE SERPL-SCNC: 108 MMOL/L (ref 100–108)
CHOLEST SERPL-MCNC: 208 MG/DL
CLARITY UR: CLEAR
CO2 SERPL-SCNC: 26 MMOL/L (ref 21–32)
COLOR UR: YELLOW
CREAT SERPL-MCNC: 0.99 MG/DL (ref 0.6–1.3)
EOSINOPHIL # BLD AUTO: 0.19 THOUSAND/ΜL (ref 0–0.61)
EOSINOPHIL NFR BLD AUTO: 2 % (ref 0–6)
ERYTHROCYTE [DISTWIDTH] IN BLOOD BY AUTOMATED COUNT: 13.5 % (ref 11.6–15.1)
EST. AVERAGE GLUCOSE BLD GHB EST-MCNC: 123 MG/DL
FINE GRAN CASTS URNS QL MICRO: ABNORMAL /LPF
GFR SERPL CREATININE-BSD FRML MDRD: 92 ML/MIN/1.73SQ M
GLUCOSE SERPL-MCNC: 101 MG/DL (ref 65–140)
GLUCOSE UR STRIP-MCNC: NEGATIVE MG/DL
HBA1C MFR BLD: 5.9 %
HCT VFR BLD AUTO: 46.9 % (ref 36.5–49.3)
HDLC SERPL-MCNC: 51 MG/DL
HGB BLD-MCNC: 15.1 G/DL (ref 12–17)
HGB UR QL STRIP.AUTO: ABNORMAL
HYALINE CASTS #/AREA URNS LPF: ABNORMAL /LPF
IMM GRANULOCYTES # BLD AUTO: 0.15 THOUSAND/UL (ref 0–0.2)
IMM GRANULOCYTES NFR BLD AUTO: 1 % (ref 0–2)
KETONES UR STRIP-MCNC: NEGATIVE MG/DL
LDLC SERPL CALC-MCNC: 138 MG/DL (ref 0–100)
LEUKOCYTE ESTERASE UR QL STRIP: NEGATIVE
LYMPHOCYTES # BLD AUTO: 1.82 THOUSANDS/ΜL (ref 0.6–4.47)
LYMPHOCYTES NFR BLD AUTO: 17 % (ref 14–44)
MCH RBC QN AUTO: 30.6 PG (ref 26.8–34.3)
MCHC RBC AUTO-ENTMCNC: 32.2 G/DL (ref 31.4–37.4)
MCV RBC AUTO: 95 FL (ref 82–98)
MONOCYTES # BLD AUTO: 0.88 THOUSAND/ΜL (ref 0.17–1.22)
MONOCYTES NFR BLD AUTO: 8 % (ref 4–12)
MUCOUS THREADS UR QL AUTO: ABNORMAL
NEUTROPHILS # BLD AUTO: 7.37 THOUSANDS/ΜL (ref 1.85–7.62)
NEUTS SEG NFR BLD AUTO: 71 % (ref 43–75)
NITRITE UR QL STRIP: NEGATIVE
NON-SQ EPI CELLS URNS QL MICRO: ABNORMAL /HPF
NONHDLC SERPL-MCNC: 157 MG/DL
NRBC BLD AUTO-RTO: 0 /100 WBCS
PH UR STRIP.AUTO: 5.5 [PH]
PLATELET # BLD AUTO: 289 THOUSANDS/UL (ref 149–390)
PMV BLD AUTO: 10.3 FL (ref 8.9–12.7)
POTASSIUM SERPL-SCNC: 4.1 MMOL/L (ref 3.5–5.3)
PROT SERPL-MCNC: 8.7 G/DL (ref 6.4–8.2)
PROT UR STRIP-MCNC: ABNORMAL MG/DL
RBC # BLD AUTO: 4.93 MILLION/UL (ref 3.88–5.62)
RBC #/AREA URNS AUTO: ABNORMAL /HPF
RBC CASTS URNS QL MICRO: ABNORMAL /LPF
SODIUM SERPL-SCNC: 138 MMOL/L (ref 136–145)
SP GR UR STRIP.AUTO: >=1.03 (ref 1–1.03)
TRIGL SERPL-MCNC: 93 MG/DL
TSH SERPL DL<=0.05 MIU/L-ACNC: 2.85 UIU/ML (ref 0.36–3.74)
UROBILINOGEN UR QL STRIP.AUTO: 0.2 E.U./DL
WBC # BLD AUTO: 10.48 THOUSAND/UL (ref 4.31–10.16)
WBC #/AREA URNS AUTO: ABNORMAL /HPF

## 2022-01-17 PROCEDURE — 3080F DIAST BP >= 90 MM HG: CPT | Performed by: INTERNAL MEDICINE

## 2022-01-17 PROCEDURE — 3008F BODY MASS INDEX DOCD: CPT | Performed by: INTERNAL MEDICINE

## 2022-01-17 PROCEDURE — 3075F SYST BP GE 130 - 139MM HG: CPT | Performed by: INTERNAL MEDICINE

## 2022-01-17 PROCEDURE — 83036 HEMOGLOBIN GLYCOSYLATED A1C: CPT

## 2022-01-17 PROCEDURE — 99396 PREV VISIT EST AGE 40-64: CPT | Performed by: INTERNAL MEDICINE

## 2022-01-17 PROCEDURE — 80061 LIPID PANEL: CPT

## 2022-01-17 PROCEDURE — 84443 ASSAY THYROID STIM HORMONE: CPT

## 2022-01-17 PROCEDURE — 85025 COMPLETE CBC W/AUTO DIFF WBC: CPT

## 2022-01-17 PROCEDURE — 80053 COMPREHEN METABOLIC PANEL: CPT

## 2022-01-17 PROCEDURE — 36415 COLL VENOUS BLD VENIPUNCTURE: CPT

## 2022-01-17 PROCEDURE — 1036F TOBACCO NON-USER: CPT | Performed by: INTERNAL MEDICINE

## 2022-01-17 PROCEDURE — 81001 URINALYSIS AUTO W/SCOPE: CPT

## 2022-01-17 RX ORDER — LOSARTAN POTASSIUM 50 MG/1
100 TABLET ORAL DAILY
Qty: 60 TABLET | Refills: 2 | Status: SHIPPED | OUTPATIENT
Start: 2022-01-17 | End: 2022-06-17 | Stop reason: SDUPTHER

## 2022-01-17 NOTE — PATIENT INSTRUCTIONS
PLEASE CHECK YOUR BLOOD PRESSURE TWICE A DAY, IN THE MORNING AND IN THE EVENING AND KEEP A BLOOD PRESSURE LOG  GOAL BLOOD PRESSURE /80 AND BELOW  PLEASE CALL THE OFFICE IF BLOOD PRESSURE IS CONSISTENTLY ABOVE 130/80  Wellness Visit for Adults   AMBULATORY CARE:   A wellness visit  is when you see your healthcare provider to get screened for health problems  Your healthcare provider will also give you advice on how to stay healthy  Write down your questions so you remember to ask them  Ask your healthcare provider how often you should have a wellness visit  What happens at a wellness visit:  Your healthcare provider will ask about your health, and your family history of health problems  This includes high blood pressure, heart disease, and cancer  He or she will ask if you have symptoms that concern you, if you smoke, and about your mood  You may also be asked about your intake of medicines, supplements, food, and alcohol  Any of the following may be done:  · Your weight  will be checked  Your height may also be checked so your body mass index (BMI) can be calculated  Your BMI shows if you are at a healthy weight  · Your blood pressure  and heart rate will be checked  Your temperature may also be checked  · Blood and urine tests  may be done  Blood tests may be done to check your cholesterol levels  Abnormal cholesterol levels increase your risk for heart disease and stroke  You may also need a blood or urine test to check for diabetes if you are at increased risk  Urine tests may be done to look for signs of an infection or kidney disease  · A physical exam  includes checking your heartbeat and lungs with a stethoscope  Your healthcare provider may also check your skin to look for sun damage  · Screening tests  may be recommended  A screening test is done to check for diseases that may not cause symptoms   The screening tests you may need depend on your age, gender, family history, and lifestyle habits  For example, colorectal screening may be recommended if you are 48years old or older  Screening tests you need if you are a woman:   · A Pap smear  is used to screen for cervical cancer  Pap smears are usually done every 3 to 5 years depending on your age  You may need them more often if you have had abnormal Pap smear test results in the past  Ask your healthcare provider how often you should have a Pap smear  · A mammogram  is an x-ray of your breasts to screen for breast cancer  Experts recommend mammograms every 2 years starting at age 48 years  You may need a mammogram at age 52 years or younger if you have an increased risk for breast cancer  Talk to your healthcare provider about when you should start having mammograms and how often you need them  Vaccines you may need:   · Get an influenza vaccine  every year  The influenza vaccine protects you from the flu  Several types of viruses cause the flu  The viruses change over time, so new vaccines are made each year  · Get a tetanus-diphtheria (Td) booster vaccine  every 10 years  This vaccine protects you against tetanus and diphtheria  Tetanus is a severe infection that may cause painful muscle spasms and lockjaw  Diphtheria is a severe bacterial infection that causes a thick covering in the back of your mouth and throat  · Get a human papillomavirus (HPV) vaccine  if you are female and aged 23 to 32 or male 23 to 24 and never received it  This vaccine protects you from HPV infection  HPV is the most common infection spread by sexual contact  HPV may also cause vaginal, penile, and anal cancers  · Get a pneumococcal vaccine  if you are aged 72 years or older  The pneumococcal vaccine is an injection given to protect you from pneumococcal disease  Pneumococcal disease is an infection caused by pneumococcal bacteria  The infection may cause pneumonia, meningitis, or an ear infection      · Get a shingles vaccine  if you are 60 or older, even if you have had shingles before  The shingles vaccine is an injection to protect you from the varicella-zoster virus  This is the same virus that causes chickenpox  Shingles is a painful rash that develops in people who had chickenpox or have been exposed to the virus  How to eat healthy:  My Plate is a model for planning healthy meals  It shows the types and amounts of foods that should go on your plate  Fruits and vegetables make up about half of your plate, and grains and protein make up the other half  A serving of dairy is included on the side of your plate  The amount of calories and serving sizes you need depends on your age, gender, weight, and height  Examples of healthy foods are listed below:  · Eat a variety of vegetables  such as dark green, red, and orange vegetables  You can also include canned vegetables low in sodium (salt) and frozen vegetables without added butter or sauces  · Eat a variety of fresh fruits , canned fruit in 100% juice, frozen fruit, and dried fruit  · Include whole grains  At least half of the grains you eat should be whole grains  Examples include whole-wheat bread, wheat pasta, brown rice, and whole-grain cereals such as oatmeal     · Eat a variety of protein foods such as seafood (fish and shellfish), lean meat, and poultry without skin (turkey and chicken)  Examples of lean meats include pork leg, shoulder, or tenderloin, and beef round, sirloin, tenderloin, and extra lean ground beef  Other protein foods include eggs and egg substitutes, beans, peas, soy products, nuts, and seeds  · Choose low-fat dairy products such as skim or 1% milk or low-fat yogurt, cheese, and cottage cheese  · Limit unhealthy fats  such as butter, hard margarine, and shortening  Exercise:  Exercise at least 30 minutes per day on most days of the week  Some examples of exercise include walking, biking, dancing, and swimming   You can also fit in more physical activity by taking the stairs instead of the elevator or parking farther away from stores  Include muscle strengthening activities 2 days each week  Regular exercise provides many health benefits  It helps you manage your weight, and decreases your risk for type 2 diabetes, heart disease, stroke, and high blood pressure  Exercise can also help improve your mood  Ask your healthcare provider about the best exercise plan for you  General health and safety guidelines:   · Do not smoke  Nicotine and other chemicals in cigarettes and cigars can cause lung damage  Ask your healthcare provider for information if you currently smoke and need help to quit  E-cigarettes or smokeless tobacco still contain nicotine  Talk to your healthcare provider before you use these products  · Limit alcohol  A drink of alcohol is 12 ounces of beer, 5 ounces of wine, or 1½ ounces of liquor  · Lose weight, if needed  Being overweight increases your risk of certain health conditions  These include heart disease, high blood pressure, type 2 diabetes, and certain types of cancer  · Protect your skin  Do not sunbathe or use tanning beds  Use sunscreen with a SPF 15 or higher  Apply sunscreen at least 15 minutes before you go outside  Reapply sunscreen every 2 hours  Wear protective clothing, hats, and sunglasses when you are outside  · Drive safely  Always wear your seatbelt  Make sure everyone in your car wears a seatbelt  A seatbelt can save your life if you are in an accident  Do not use your cell phone when you are driving  This could distract you and cause an accident  Pull over if you need to make a call or send a text message  · Practice safe sex  Use latex condoms if are sexually active and have more than one partner  Your healthcare provider may recommend screening tests for sexually transmitted infections (STIs)  · Wear helmets, lifejackets, and protective gear    Always wear a helmet when you ride a bike or motorcycle, go skiing, or play sports that could cause a head injury  Wear protective equipment when you play sports  Wear a lifejacket when you are on a boat or doing water sports  © Copyright [employer] 2021 Information is for End User's use only and may not be sold, redistributed or otherwise used for commercial purposes  All illustrations and images included in CareNotes® are the copyrighted property of [employer] Inc  or [facility] Caren Davey   The above information is an  only  It is not intended as medical advice for individual conditions or treatments  Talk to your doctor, nurse or pharmacist before following any medical regimen to see if it is safe and effective for you

## 2022-01-17 NOTE — PROGRESS NOTES
Assessment/Plan:    Annual physical exam  - History and physical examination done  - Pt was counseled to eat a heart healthy diet, to drink at least 2 L of water daily, to take a daily multivitamin and to exercise for at least 30 minutes of cardio exercise daily, for at least 5 days a week  - CBC, CMP, TSH and lipid panel were ordered at a previous visit and the forms were printed for patient today and he was encouraged to get the labs done  -   He got his two COVID shots and flu shot but is not sure of his last tetanus shot   - follow up in  Three months       Encounter to establish care  - Patient has established care with our practice and will sign for a release of his medical records from his previous PCP  -follow-up in 3 months      Essential hypertension   - blood pressure is improving but not optimally controlled  -patient was counseled to check his blood pressure twice a day, in the morning and in the evening and to keep a blood pressure log and bring it into his next visit  He was encouraged to call the office if his blood pressure is consistently above 130/80   -Will increase his losartan from  mg daily  -patient was encouraged to get his lab work done including his CMP, TSH and urinalysis  -continue with a low-salt diet and with exercise       Diagnoses and all orders for this visit:    Encounter to establish care    Annual physical exam    Uncontrolled stage 2 hypertension  -     losartan (COZAAR) 50 mg tablet; Take 2 tablets (100 mg total) by mouth daily          Subjective:      Patient ID: Juan Belle is a 35 y o  male  HPI  Patient presents to establish care      Last primary care physician- did not really see doctors much    Past medical history- no known health issures    Past surgical history- none    Medications- none prior to losartan    Allergies - NKDA    Alcohol use - none in over a year    Nicotine use - never    Recreational drug use - never    Work- payroll specialist    Health maintenance- cant remember    Immunization- got the two covid shots, flu shot, cant remember last tetanus    Family history-  htn - parents  Dm - dad,   Cancer ? Primary - dad    Today, patient has no complaints  He states that he has not been checking his blood pressure but he feels like his blood pressure is much better  He denies fever, chills, night sweats, headache, dizziness, chest pain, shortness of breath, palpitations, cough, nausea, vomiting, abdominal pain, diarrhea, constipation, myalgias, arthralgias, feelings of anxiety or depression  Patient presents for an annual physical exam     Last annual physical exam-  Cannot remember    Past medical history- none prior to  Recent diagnosis of hypertension but he  did not really see doctors in the past     Past surgical history- none    Medications- losartan    Allergies- no known drug allergies    Diet- mixture of balanced diet and junk food, drinks about a couple of glasses of water daily,     Exercise- not recently, used to take walks    Alcohol use-  Caffeine and soda use- no coffee or tea , soda - 4-5 cups daily    Nicotine use- never    Recreational drug use- never    Work-     Sexual history, STD history and HIV testing- not sexually active for years, std hx - never,  hiv testing - never    Gynecological history/Prostate health/testicular health history- occasionally hesitancy    Colonoscopy- N/A    Immunization history- got the two covid shots, flu shot, cant remember last tetanus    Dental visit- every six months usually    Vision- myopia - glasses    Family history-  htn - parents  Dm - dad,   Cancer ? Primary - dad      Today, patient has no complaints  Has not been checking his blood pressure but he feels like his blood pressure is much better    He denies fever, chills, night sweats, headache, dizziness, chest pain, shortness of breath, palpitations, cough, nausea, vomiting, abdominal pain, diarrhea, constipation, myalgias, arthralgias, feelings of anxiety or depression        The following portions of the patient's history were reviewed and updated as appropriate:   He  has no past medical history on file  He   Patient Active Problem List    Diagnosis Date Noted    [facility] [place] [facility] 01/17/2022    Uncontrolled stage 2 hypertension 12/10/2021    Annual physical exam 12/10/2021    BMI 36 0-36 9,adult 12/10/2021    Polydipsia 12/10/2021    Polyuria 12/10/2021    Closed low lateral malleolus fracture, left, with routine healing, subsequent encounter 03/19/2021     He  has no past surgical history on file  His family history includes Hypertension in his father and mother  He  reports that he has never smoked  He has never used smokeless tobacco  He reports current alcohol use  He reports that he does not use drugs  Current Outpatient Medications   Medication Sig Dispense Refill    losartan (COZAAR) 50 mg tablet Take 2 tablets (100 mg total) by mouth daily 60 tablet 2     No current facility-administered medications for this visit  Current Outpatient Medications on File Prior to Visit   Medication Sig    [DISCONTINUED] losartan (COZAAR) 50 mg tablet Take 1 tablet (50 mg total) by mouth daily     No current facility-administered medications on file prior to visit  He has No Known Allergies       Review of Systems   Constitutional: Negative for activity change, chills, fatigue, fever and unexpected weight change  HENT: Negative for ear pain, postnasal drip, rhinorrhea, sinus pressure and sore throat  Eyes: Negative for pain  Respiratory: Negative for cough, choking, chest tightness, shortness of breath and wheezing  Cardiovascular: Negative for chest pain, palpitations and leg swelling  Gastrointestinal: Negative for abdominal pain, constipation, diarrhea, nausea and vomiting  Genitourinary: Negative for dysuria and hematuria     Musculoskeletal: Negative for arthralgias, back pain, gait problem, joint swelling, myalgias and neck stiffness  Skin: Negative for pallor and rash  Neurological: Negative for dizziness, tremors, seizures, syncope, light-headedness and headaches  Hematological: Negative for adenopathy  Psychiatric/Behavioral: Negative for behavioral problems  Objective:      /90 (BP Location: Left arm, Patient Position: Sitting, Cuff Size: Large)   Pulse 99   Temp 98 7 °F (37 1 °C) (Temporal)   Ht 5' 9 09" (1 755 m)   Wt 118 kg (260 lb 9 6 oz)   SpO2 96%   BMI 38 38 kg/m²          Physical Exam  Constitutional:       General: He is not in acute distress  Appearance: He is well-developed  He is obese  He is not diaphoretic  Comments:  BMI is 38 38   HENT:      Head: Normocephalic and atraumatic  Right Ear: External ear normal       Left Ear: External ear normal       Nose: Nose normal       Mouth/Throat:      Pharynx: Posterior oropharyngeal erythema ( dry mucous membranes with mild oropharyngeal erythema and Mallampati class 3 oropharynx) present  No oropharyngeal exudate  Eyes:      General: No scleral icterus  Right eye: No discharge  Left eye: No discharge  Conjunctiva/sclera: Conjunctivae normal       Pupils: Pupils are equal, round, and reactive to light  Neck:      Thyroid: No thyromegaly  Vascular: No JVD  Trachea: No tracheal deviation  Cardiovascular:      Rate and Rhythm: Normal rate and regular rhythm  Heart sounds: Normal heart sounds  No murmur heard  No friction rub  No gallop  Pulmonary:      Effort: Pulmonary effort is normal  No respiratory distress  Breath sounds: Normal breath sounds  No wheezing or rales  Chest:      Chest wall: No tenderness  Abdominal:      General: Bowel sounds are normal  There is no distension  Palpations: Abdomen is soft  There is no mass  Tenderness: There is no abdominal tenderness  There is no guarding or rebound  Musculoskeletal:         General: No tenderness or deformity  Normal range of motion  Cervical back: Normal range of motion and neck supple  Lymphadenopathy:      Cervical: No cervical adenopathy  Skin:     General: Skin is warm and dry  Coloration: Skin is not pale  Findings: No erythema or rash  Neurological:      Mental Status: He is alert and oriented to person, place, and time  Cranial Nerves: No cranial nerve deficit  Motor: No abnormal muscle tone  Coordination: Coordination normal       Deep Tendon Reflexes: Reflexes are normal and symmetric  Comments: Cranial nerves 2-12 are intact bilaterally  Muscle strength is 5/5 in all extremities  Sensation is intact in bilateral face and extremities  Rapid alternating movement and finger-to-nose pointing test intact   Deep tendon reflexes are 2+ bilaterally  Gait is intact       Psychiatric:         Behavior: Behavior normal            No visits with results within 12 Month(s) from this visit  Latest known visit with results is:   No results found for any previous visit

## 2022-01-20 ENCOUNTER — TELEPHONE (OUTPATIENT)
Dept: INTERNAL MEDICINE CLINIC | Age: 42
End: 2022-01-20

## 2022-01-20 NOTE — TELEPHONE ENCOUNTER
I called and left patient a message on his voicemail to call the office regarding the results of his lab tests  He has some protein in his urine, his hemoglobin A1c is mildly elevated, and has some elevated lipids

## 2022-02-03 ENCOUNTER — TELEPHONE (OUTPATIENT)
Dept: INTERNAL MEDICINE CLINIC | Age: 42
End: 2022-02-03

## 2022-02-03 ENCOUNTER — TELEPHONE (OUTPATIENT)
Dept: OTHER | Facility: HOSPITAL | Age: 42
End: 2022-02-03

## 2022-02-03 DIAGNOSIS — R73.03 PREDIABETES: ICD-10-CM

## 2022-02-03 DIAGNOSIS — R74.01 TRANSAMINITIS: Primary | ICD-10-CM

## 2022-02-03 DIAGNOSIS — R77.9 ELEVATED SERUM PROTEIN LEVEL: ICD-10-CM

## 2022-02-03 DIAGNOSIS — R80.8 OTHER PROTEINURIA: ICD-10-CM

## 2022-02-03 NOTE — TELEPHONE ENCOUNTER
The 10-year ASCVD risk score (Alla Choi et al , 2013) is: 2 1%    Values used to calculate the score:      Age: 35 years      Sex: Male      Is Non- : No      Diabetic: No      Tobacco smoker: No      Systolic Blood Pressure: 678 mmHg      Is BP treated: Yes      HDL Cholesterol: 51 mg/dL      Total Cholesterol: 208 mg/dL    I called and discussed pt's lab results with him  He has proteinuria, transaminitis and elevated lipids  He was counseled to diet and exercise and we will recheck his urinalysis and CMP prior to his next visit  I will order the labs and send a message to the clinical staff to mail it to him  He expressed his understanding and agreement with the plan

## 2022-02-03 NOTE — TELEPHONE ENCOUNTER
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

## 2022-04-22 ENCOUNTER — APPOINTMENT (OUTPATIENT)
Dept: LAB | Age: 42
End: 2022-04-22
Payer: COMMERCIAL

## 2022-04-22 ENCOUNTER — OFFICE VISIT (OUTPATIENT)
Dept: INTERNAL MEDICINE CLINIC | Age: 42
End: 2022-04-22
Payer: COMMERCIAL

## 2022-04-22 VITALS
HEIGHT: 69 IN | DIASTOLIC BLOOD PRESSURE: 92 MMHG | BODY MASS INDEX: 39.83 KG/M2 | WEIGHT: 268.9 LBS | TEMPERATURE: 97.5 F | HEART RATE: 77 BPM | SYSTOLIC BLOOD PRESSURE: 130 MMHG | OXYGEN SATURATION: 98 %

## 2022-04-22 DIAGNOSIS — R82.998 CRYSTALLURIA: ICD-10-CM

## 2022-04-22 DIAGNOSIS — R73.03 PREDIABETES: ICD-10-CM

## 2022-04-22 DIAGNOSIS — R80.8 OTHER PROTEINURIA: ICD-10-CM

## 2022-04-22 DIAGNOSIS — R74.01 TRANSAMINITIS: ICD-10-CM

## 2022-04-22 DIAGNOSIS — R31.29 OTHER MICROSCOPIC HEMATURIA: ICD-10-CM

## 2022-04-22 DIAGNOSIS — I10 ESSENTIAL HYPERTENSION: Primary | ICD-10-CM

## 2022-04-22 DIAGNOSIS — I10 ESSENTIAL HYPERTENSION: ICD-10-CM

## 2022-04-22 DIAGNOSIS — E78.49 OTHER HYPERLIPIDEMIA: ICD-10-CM

## 2022-04-22 PROBLEM — Z76.89 ENCOUNTER TO ESTABLISH CARE: Status: RESOLVED | Noted: 2022-01-17 | Resolved: 2022-04-22

## 2022-04-22 LAB
ALBUMIN SERPL BCP-MCNC: 3.6 G/DL (ref 3.5–5)
ALP SERPL-CCNC: 80 U/L (ref 46–116)
ALT SERPL W P-5'-P-CCNC: 122 U/L (ref 12–78)
ANION GAP SERPL CALCULATED.3IONS-SCNC: 4 MMOL/L (ref 4–13)
AST SERPL W P-5'-P-CCNC: 53 U/L (ref 5–45)
BACTERIA UR QL AUTO: ABNORMAL /HPF
BILIRUB SERPL-MCNC: 0.49 MG/DL (ref 0.2–1)
BILIRUB UR QL STRIP: NEGATIVE
BUN SERPL-MCNC: 12 MG/DL (ref 5–25)
CALCIUM SERPL-MCNC: 9.5 MG/DL (ref 8.3–10.1)
CHLORIDE SERPL-SCNC: 109 MMOL/L (ref 100–108)
CLARITY UR: ABNORMAL
CO2 SERPL-SCNC: 28 MMOL/L (ref 21–32)
COLOR UR: ABNORMAL
CREAT SERPL-MCNC: 0.78 MG/DL (ref 0.6–1.3)
GFR SERPL CREATININE-BSD FRML MDRD: 110 ML/MIN/1.73SQ M
GLUCOSE P FAST SERPL-MCNC: 112 MG/DL (ref 65–99)
GLUCOSE UR STRIP-MCNC: NEGATIVE MG/DL
HBV CORE AB SER QL: NORMAL
HBV CORE IGM SER QL: NORMAL
HBV SURFACE AG SER QL: NORMAL
HCV AB SER QL: NORMAL
HGB UR QL STRIP.AUTO: ABNORMAL
KETONES UR STRIP-MCNC: NEGATIVE MG/DL
LEUKOCYTE ESTERASE UR QL STRIP: NEGATIVE
MUCOUS THREADS UR QL AUTO: ABNORMAL
NITRITE UR QL STRIP: NEGATIVE
NON-SQ EPI CELLS URNS QL MICRO: ABNORMAL /HPF
PH UR STRIP.AUTO: 5.5 [PH]
POTASSIUM SERPL-SCNC: 4.3 MMOL/L (ref 3.5–5.3)
PROT SERPL-MCNC: 8.2 G/DL (ref 6.4–8.2)
PROT UR STRIP-MCNC: ABNORMAL MG/DL
RBC #/AREA URNS AUTO: ABNORMAL /HPF
SODIUM SERPL-SCNC: 141 MMOL/L (ref 136–145)
SP GR UR STRIP.AUTO: 1.02 (ref 1–1.03)
UROBILINOGEN UR STRIP-ACNC: <2 MG/DL
WBC #/AREA URNS AUTO: ABNORMAL /HPF

## 2022-04-22 PROCEDURE — 86803 HEPATITIS C AB TEST: CPT

## 2022-04-22 PROCEDURE — 3008F BODY MASS INDEX DOCD: CPT | Performed by: INTERNAL MEDICINE

## 2022-04-22 PROCEDURE — 87340 HEPATITIS B SURFACE AG IA: CPT

## 2022-04-22 PROCEDURE — 81001 URINALYSIS AUTO W/SCOPE: CPT

## 2022-04-22 PROCEDURE — 36415 COLL VENOUS BLD VENIPUNCTURE: CPT

## 2022-04-22 PROCEDURE — 86704 HEP B CORE ANTIBODY TOTAL: CPT

## 2022-04-22 PROCEDURE — 86705 HEP B CORE ANTIBODY IGM: CPT

## 2022-04-22 PROCEDURE — 3080F DIAST BP >= 90 MM HG: CPT | Performed by: INTERNAL MEDICINE

## 2022-04-22 PROCEDURE — 99214 OFFICE O/P EST MOD 30 MIN: CPT | Performed by: INTERNAL MEDICINE

## 2022-04-22 PROCEDURE — 3725F SCREEN DEPRESSION PERFORMED: CPT | Performed by: INTERNAL MEDICINE

## 2022-04-22 PROCEDURE — 3075F SYST BP GE 130 - 139MM HG: CPT | Performed by: INTERNAL MEDICINE

## 2022-04-22 PROCEDURE — 1036F TOBACCO NON-USER: CPT | Performed by: INTERNAL MEDICINE

## 2022-04-22 PROCEDURE — 80053 COMPREHEN METABOLIC PANEL: CPT

## 2022-04-22 RX ORDER — AMLODIPINE BESYLATE 5 MG/1
5 TABLET ORAL DAILY
Qty: 90 TABLET | Refills: 1 | Status: SHIPPED | OUTPATIENT
Start: 2022-04-22 | End: 2022-07-27 | Stop reason: DRUGHIGH

## 2022-04-22 NOTE — PROGRESS NOTES
Assessment/Plan:    Essential hypertension  - not optimally controlled  - will start patient on amlodipine 5 mg daily  - continue losartan at 100 mg daily   - he was counseled to report if he develops any side effects to the new medication  -continue with a low-salt diet  -follow-up in 3 months    Prediabetes  - hemoglobin A1c done on January 17th, 2022 was 5 9   - he was counseled to eat a diet low in carbohydrates and simple sugars  -continue with exercise    Hyperlipidemia  -lipid panel done on January 17th, 2022 showed a total cholesterol of 208, triglyceride of 93, HDL of 51 and LDL of 138  - calculated 10 year ASCVD risk is 2%   - patient was counseled to eat a heart healthy diet and exercise  -will recheck a lipid panel at a later date    Transaminitis  - CMP done on January 17th, 2022 showed elevated AST and ALT at 50 and 115 respectively   - will check a chronic hepatitis panel  -will recheck a CMP     Microscopic hematuria  -urinalysis done on January 17th, 2022 showed  Small blood with proteinuria of 1+, with calcium oxalate crystals   - patient was counseled to try to keep well hydrated  -will recheck a urinalysis later    Crystalluria   - patient had calcium oxalate crystals in his urine   -he was counseled to drink at least 2 L of water daily and more if he drinks   Caffeine  The 10-year ASCVD risk score (Naun Blevins et al , 2013) is: 2%    Values used to calculate the score:      Age: 35 years      Sex: Male      Is Non- : No      Diabetic: No      Tobacco smoker: No      Systolic Blood Pressure: 491 mmHg      Is BP treated: Yes      HDL Cholesterol: 51 mg/dL      Total Cholesterol: 208 mg/dL       Diagnoses and all orders for this visit:    Essential hypertension  -     amLODIPine (NORVASC) 5 mg tablet; Take 1 tablet (5 mg total) by mouth daily  -     Comprehensive metabolic panel; Future    Prediabetes  -     Comprehensive metabolic panel;  Future    Transaminitis  - Chronic Hepatitis Panel; Future  -     Comprehensive metabolic panel; Future    Other hyperlipidemia    Other proteinuria  -     Urinalysis with microscopic; Future  -     Comprehensive metabolic panel; Future    Other microscopic hematuria  -     Urinalysis with microscopic; Future    Crystalluria  -     Urinalysis with microscopic; Future          Subjective:      Patient ID: Clinton Perry is a 35 y o  male  HPI  Pt presents for a follow up visit regarding his htn, hematuria, pre diabetes, transaminitis , crystalluria, hyperlipidemia  Still eating some salt in his diet  He admits that there are somethings that are high in salt that he eats  Not checking his bp at home  He takes losartan 100 mg daily  No smoking or alcohol  Regular stress and nothing unusual   He denies fever, chills, night sweats, headache, dizziness, chest pain, shortness of breath, palpitations, nausea, vomiting, abdominal pain, diarrhea, constipation, myalgias, arthralgias  The following portions of the patient's history were reviewed and updated as appropriate:   He  has no past medical history on file  He   Patient Active Problem List    Diagnosis Date Noted    Other hyperlipidemia 04/22/2022    Other microscopic hematuria 04/22/2022    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    Other proteinuria 02/03/2022    Transaminitis 02/03/2022    Prediabetes 02/03/2022    Elevated serum protein level 02/03/2022    Uncontrolled stage 2 hypertension 12/10/2021    Annual physical exam 12/10/2021    BMI 36 0-36 9,adult 12/10/2021    Polydipsia 12/10/2021    Polyuria 12/10/2021    Closed low lateral malleolus fracture, left, with routine healing, subsequent encounter 03/19/2021     He  has no past surgical history on file  His family history includes Hypertension in his father and mother  He  reports that he has never smoked  He has never used smokeless tobacco  He reports current alcohol use   He reports that he does not use drugs   Current Outpatient Medications   Medication Sig Dispense Refill    losartan (COZAAR) 50 mg tablet Take 2 tablets (100 mg total) by mouth daily 60 tablet 2    amLODIPine (NORVASC) 5 mg tablet Take 1 tablet (5 mg total) by mouth daily 90 tablet 1     No current facility-administered medications for this visit  Current Outpatient Medications on File Prior to Visit   Medication Sig    losartan (COZAAR) 50 mg tablet Take 2 tablets (100 mg total) by mouth daily     No current facility-administered medications on file prior to visit  He has No Known Allergies       Review of Systems   Constitutional: Negative for activity change, chills, fatigue, fever and unexpected weight change  HENT: Negative for ear pain, postnasal drip, rhinorrhea, sinus pressure and sore throat  Eyes: Negative for pain  Respiratory: Negative for cough, choking, chest tightness, shortness of breath and wheezing  Cardiovascular: Negative for chest pain, palpitations and leg swelling  Gastrointestinal: Negative for abdominal pain, constipation, diarrhea, nausea and vomiting  Genitourinary: Negative for dysuria and hematuria  Musculoskeletal: Negative for arthralgias, back pain, gait problem, joint swelling, myalgias and neck stiffness  Skin: Negative for pallor and rash  Neurological: Negative for dizziness, tremors, seizures, syncope, light-headedness and headaches  Hematological: Negative for adenopathy  Psychiatric/Behavioral: Negative for behavioral problems  Objective:      /92 (BP Location: Left arm, Patient Position: Sitting, Cuff Size: Large)   Pulse 77   Temp 97 5 °F (36 4 °C) (Temporal)   Ht 5' 9 29" (1 76 m)   Wt 122 kg (268 lb 14 4 oz)   SpO2 98% Comment: room air  BMI 39 38 kg/m²          Physical Exam  Constitutional:       General: He is not in acute distress  Appearance: He is well-developed  He is not diaphoretic  HENT:      Head: Normocephalic and atraumatic  Right Ear: External ear normal       Left Ear: External ear normal       Nose: Nose normal       Mouth/Throat:      Pharynx: No oropharyngeal exudate  Eyes:      General: No scleral icterus  Right eye: No discharge  Left eye: No discharge  Conjunctiva/sclera: Conjunctivae normal       Pupils: Pupils are equal, round, and reactive to light  Neck:      Thyroid: No thyromegaly  Vascular: No JVD  Trachea: No tracheal deviation  Cardiovascular:      Rate and Rhythm: Normal rate and regular rhythm  Heart sounds: Normal heart sounds  No murmur heard  No friction rub  No gallop  Pulmonary:      Effort: Pulmonary effort is normal  No respiratory distress  Breath sounds: Normal breath sounds  No wheezing or rales  Chest:      Chest wall: No tenderness  Abdominal:      General: Bowel sounds are normal  There is no distension  Palpations: Abdomen is soft  There is no mass  Tenderness: There is no abdominal tenderness  There is no guarding or rebound  Musculoskeletal:         General: No tenderness or deformity  Normal range of motion  Cervical back: Normal range of motion and neck supple  Right lower leg: Edema (Trace pitting pedal edema of right lower extremity) present  Lymphadenopathy:      Cervical: No cervical adenopathy  Skin:     General: Skin is warm and dry  Coloration: Skin is not pale  Findings: No erythema or rash  Neurological:      Mental Status: He is alert and oriented to person, place, and time  Cranial Nerves: No cranial nerve deficit  Motor: No abnormal muscle tone  Coordination: Coordination normal       Deep Tendon Reflexes: Reflexes are normal and symmetric     Psychiatric:         Behavior: Behavior normal            Appointment on 01/17/2022   Component Date Value Ref Range Status    WBC 01/17/2022 10 48* 4 31 - 10 16 Thousand/uL Final    RBC 01/17/2022 4 93  3 88 - 5 62 Million/uL Final    Hemoglobin 01/17/2022 15 1  12 0 - 17 0 g/dL Final    Hematocrit 01/17/2022 46 9  36 5 - 49 3 % Final    MCV 01/17/2022 95  82 - 98 fL Final    MCH 01/17/2022 30 6  26 8 - 34 3 pg Final    MCHC 01/17/2022 32 2  31 4 - 37 4 g/dL Final    RDW 01/17/2022 13 5  11 6 - 15 1 % Final    MPV 01/17/2022 10 3  8 9 - 12 7 fL Final    Platelets 27/43/9039 289  149 - 390 Thousands/uL Final    nRBC 01/17/2022 0  /100 WBCs Final    Neutrophils Relative 01/17/2022 71  43 - 75 % Final    Immat GRANS % 01/17/2022 1  0 - 2 % Final    Lymphocytes Relative 01/17/2022 17  14 - 44 % Final    Monocytes Relative 01/17/2022 8  4 - 12 % Final    Eosinophils Relative 01/17/2022 2  0 - 6 % Final    Basophils Relative 01/17/2022 1  0 - 1 % Final    Neutrophils Absolute 01/17/2022 7 37  1 85 - 7 62 Thousands/µL Final    Immature Grans Absolute 01/17/2022 0 15  0 00 - 0 20 Thousand/uL Final    Lymphocytes Absolute 01/17/2022 1 82  0 60 - 4 47 Thousands/µL Final    Monocytes Absolute 01/17/2022 0 88  0 17 - 1 22 Thousand/µL Final    Eosinophils Absolute 01/17/2022 0 19  0 00 - 0 61 Thousand/µL Final    Basophils Absolute 01/17/2022 0 07  0 00 - 0 10 Thousands/µL Final    TSH 3RD GENERATON 01/17/2022 2 850  0 358 - 3 740 uIU/mL Final    Sodium 01/17/2022 138  136 - 145 mmol/L Final    Potassium 01/17/2022 4 1  3 5 - 5 3 mmol/L Final    Chloride 01/17/2022 108  100 - 108 mmol/L Final    CO2 01/17/2022 26  21 - 32 mmol/L Final    ANION GAP 01/17/2022 4  4 - 13 mmol/L Final    BUN 01/17/2022 14  5 - 25 mg/dL Final    Creatinine 01/17/2022 0 99  0 60 - 1 30 mg/dL Final    Standardized to IDMS reference method    Glucose 01/17/2022 101  65 - 140 mg/dL Final    If the patient is fasting, the ADA then defines impaired fasting glucose as > 100 mg/dL and diabetes as > or equal to 123 mg/dL  Specimen collection should occur prior to Sulfasalazine administration due to the potential for falsely depressed results   Specimen collection should occur prior to Sulfapyridine administration due to the potential for falsely elevated results   Calcium 01/17/2022 9 7  8 3 - 10 1 mg/dL Final    AST 01/17/2022 50* 5 - 45 U/L Final    Specimen collection should occur prior to Sulfasalazine administration due to the potential for falsely depressed results   ALT 01/17/2022 115* 12 - 78 U/L Final    Specimen collection should occur prior to Sulfasalazine and/or Sulfapyridine administration due to the potential for falsely depressed results   Alkaline Phosphatase 01/17/2022 81  46 - 116 U/L Final    Total Protein 01/17/2022 8 7* 6 4 - 8 2 g/dL Final    Albumin 01/17/2022 3 9  3 5 - 5 0 g/dL Final    Total Bilirubin 01/17/2022 0 71  0 20 - 1 00 mg/dL Final    Use of this assay is not recommended for patients undergoing treatment with eltrombopag due to the potential for falsely elevated results   eGFR 01/17/2022 92  ml/min/1 73sq m Final    Cholesterol 01/17/2022 208* See Comment mg/dL Final    Cholesterol:         Pediatric <18 Years        Desirable          <170 mg/dL      Borderline High    170-199 mg/dL      High               >=200 mg/dL        Adult >=18 Years            Desirable         <200 mg/dL      Borderline High   200-239 mg/dL      High              >239 mg/dL      Triglycerides 01/17/2022 93  See Comment mg/dL Final    Triglyceride:     0-9Y            <75mg/dL     10Y-17Y         <90 mg/dL       >=18Y     Normal          <150 mg/dL     Borderline High 150-199 mg/dL     High            200-499 mg/dL        Very High       >499 mg/dL    Specimen collection should occur prior to N-Acetylcysteine or Metamizole administration due to the potential for falsely depressed results   HDL, Direct 01/17/2022 51  >=40 mg/dL Final    Specimen collection should occur prior to Metamizole administration due to the potential for falsley depressed results      LDL Calculated 01/17/2022 138* 0 - 100 mg/dL Final    LDL Cholesterol:   Optimal           <100 mg/dl     Near Optimal      100-129 mg/dl     Above Optimal       Borderline High 130-159 mg/dl       High            160-189 mg/dl       Very High       >189 mg/dl         This screening LDL is a calculated result  It does not have the accuracy of the Direct Measured LDL in the monitoring of patients with hyperlipidemia and/or statin therapy  Direct Measure LDL (QMD921) must be ordered separately in these patients   Non-HDL-Chol (CHOL-HDL) 01/17/2022 157  mg/dl Final    Hemoglobin A1C 01/17/2022 5 9* Normal 3 8-5 6%; PreDiabetic 5 7-6 4%;  Diabetic >=6 5%; Glycemic control for adults with diabetes <7 0% % Final    EAG 01/17/2022 123  mg/dl Final    Clarity, UA 01/17/2022 Clear   Final    Color, UA 01/17/2022 Yellow   Final    Specific Gravity, UA 01/17/2022 >=1 030  1 003 - 1 030 Final    pH, UA 01/17/2022 5 5  4 5, 5 0, 5 5, 6 0, 6 5, 7 0, 7 5, 8 0 Final    Glucose, UA 01/17/2022 Negative  Negative mg/dl Final    Ketones, UA 01/17/2022 Negative  Negative mg/dl Final    Blood, UA 01/17/2022 Small   Final    Protein, UA 01/17/2022 30 (1+)* Negative mg/dl Final    Nitrite, UA 01/17/2022 Negative  Negative Final    Bilirubin, UA 01/17/2022 Negative  Negative Final    Urobilinogen, UA 01/17/2022 0 2  0 2, 1 0 E U /dl E U /dl Final    Leukocytes, UA 01/17/2022 Negative  Negative Final    WBC, UA 01/17/2022 None Seen  None Seen, 2-4, 5-60 /hpf Final    RBC, UA 01/17/2022 None Seen  None Seen, 2-4 /hpf Final    Hyaline Casts, UA 01/17/2022 5-10* None Seen /lpf Final    Bacteria, UA 01/17/2022 None Seen  None Seen, Occasional /hpf Final    Fine granular casts 01/17/2022 3-5  /lpf Final    Ca Oxalate Debbi, UA 01/17/2022 Occasional* None Seen /hpf Final    Epithelial Cells 01/17/2022 None Seen  None Seen, Occasional /hpf Final    MUCUS THREADS 01/17/2022 Moderate* None Seen Final    RBC Casts, UA 01/17/2022 3-5* (none) /lpf Final

## 2022-05-05 ENCOUNTER — TELEPHONE (OUTPATIENT)
Dept: INTERNAL MEDICINE CLINIC | Age: 42
End: 2022-05-05

## 2022-05-05 DIAGNOSIS — R82.71 BACTERIURIA: ICD-10-CM

## 2022-05-05 DIAGNOSIS — R30.0 DYSURIA: ICD-10-CM

## 2022-05-05 DIAGNOSIS — R31.29 OTHER MICROSCOPIC HEMATURIA: Primary | ICD-10-CM

## 2022-05-05 DIAGNOSIS — R80.8 OTHER PROTEINURIA: ICD-10-CM

## 2022-05-05 NOTE — TELEPHONE ENCOUNTER
I called and discussed the result of patients lab tests with him  He admits that he has occasional mild dysuria  I will put in an order for a repeat urinalysis with reflex to culture and and sensitivity and follow up with the results and treat him if he has a UTI  His urine is  Showing blood and bacteria but no leucocytes  Transaminitis is stable so we will keep an eye on his liver enzymes and recheck LFT at his next visit( it may be secondary to hepatosteatosis) but hepatitis panel is negative  He expressed his understanding and agreement with the plan

## 2022-05-07 ENCOUNTER — APPOINTMENT (OUTPATIENT)
Dept: LAB | Facility: MEDICAL CENTER | Age: 42
End: 2022-05-07
Payer: COMMERCIAL

## 2022-05-07 DIAGNOSIS — R82.71 BACTERIURIA: ICD-10-CM

## 2022-05-07 DIAGNOSIS — R31.29 OTHER MICROSCOPIC HEMATURIA: ICD-10-CM

## 2022-05-07 DIAGNOSIS — R30.0 DYSURIA: ICD-10-CM

## 2022-05-07 DIAGNOSIS — R80.8 OTHER PROTEINURIA: ICD-10-CM

## 2022-05-07 LAB
BACTERIA UR QL AUTO: ABNORMAL /HPF
BILIRUB UR QL STRIP: NEGATIVE
CLARITY UR: CLEAR
COLOR UR: ABNORMAL
GLUCOSE UR STRIP-MCNC: NEGATIVE MG/DL
HGB UR QL STRIP.AUTO: ABNORMAL
HYALINE CASTS #/AREA URNS LPF: ABNORMAL /LPF
KETONES UR STRIP-MCNC: NEGATIVE MG/DL
LEUKOCYTE ESTERASE UR QL STRIP: NEGATIVE
MUCOUS THREADS UR QL AUTO: ABNORMAL
NITRITE UR QL STRIP: NEGATIVE
NON-SQ EPI CELLS URNS QL MICRO: ABNORMAL /HPF
PH UR STRIP.AUTO: 5.5 [PH]
PROT UR STRIP-MCNC: NEGATIVE MG/DL
RBC #/AREA URNS AUTO: ABNORMAL /HPF
SP GR UR STRIP.AUTO: 1.01 (ref 1–1.03)
UROBILINOGEN UR STRIP-ACNC: <2 MG/DL
WBC #/AREA URNS AUTO: ABNORMAL /HPF

## 2022-05-07 PROCEDURE — 81001 URINALYSIS AUTO W/SCOPE: CPT

## 2022-05-17 DIAGNOSIS — R31.29 OTHER MICROSCOPIC HEMATURIA: Primary | ICD-10-CM

## 2022-06-17 DIAGNOSIS — I10 UNCONTROLLED STAGE 2 HYPERTENSION: ICD-10-CM

## 2022-06-17 RX ORDER — LOSARTAN POTASSIUM 50 MG/1
100 TABLET ORAL DAILY
Qty: 180 TABLET | Refills: 1 | Status: SHIPPED | OUTPATIENT
Start: 2022-06-17 | End: 2022-07-27 | Stop reason: DRUGHIGH

## 2022-07-15 ENCOUNTER — OFFICE VISIT (OUTPATIENT)
Dept: UROLOGY | Facility: AMBULATORY SURGERY CENTER | Age: 42
End: 2022-07-15
Payer: COMMERCIAL

## 2022-07-15 VITALS
HEIGHT: 69 IN | BODY MASS INDEX: 38.66 KG/M2 | HEART RATE: 76 BPM | WEIGHT: 261 LBS | SYSTOLIC BLOOD PRESSURE: 160 MMHG | DIASTOLIC BLOOD PRESSURE: 100 MMHG

## 2022-07-15 DIAGNOSIS — R31.29 OTHER MICROSCOPIC HEMATURIA: Primary | ICD-10-CM

## 2022-07-15 LAB
BACTERIA UR QL AUTO: ABNORMAL /HPF
BILIRUB UR QL STRIP: NEGATIVE
CLARITY UR: CLEAR
COLOR UR: ABNORMAL
GLUCOSE UR STRIP-MCNC: NEGATIVE MG/DL
HGB UR QL STRIP.AUTO: ABNORMAL
KETONES UR STRIP-MCNC: NEGATIVE MG/DL
LEUKOCYTE ESTERASE UR QL STRIP: NEGATIVE
MUCOUS THREADS UR QL AUTO: ABNORMAL
NITRITE UR QL STRIP: NEGATIVE
NON-SQ EPI CELLS URNS QL MICRO: ABNORMAL /HPF
PH UR STRIP.AUTO: 6 [PH]
PROT UR STRIP-MCNC: NEGATIVE MG/DL
RBC #/AREA URNS AUTO: ABNORMAL /HPF
SL AMB  POCT GLUCOSE, UA: ABNORMAL
SL AMB LEUKOCYTE ESTERASE,UA: ABNORMAL
SL AMB POCT BILIRUBIN,UA: ABNORMAL
SL AMB POCT BLOOD,UA: ABNORMAL
SL AMB POCT CLARITY,UA: CLEAR
SL AMB POCT COLOR,UA: YELLOW
SL AMB POCT KETONES,UA: ABNORMAL
SL AMB POCT NITRITE,UA: ABNORMAL
SL AMB POCT PH,UA: 5
SL AMB POCT SPECIFIC GRAVITY,UA: 1.01
SL AMB POCT URINE PROTEIN: ABNORMAL
SL AMB POCT UROBILINOGEN: 0.2
SP GR UR STRIP.AUTO: 1.02 (ref 1–1.03)
UROBILINOGEN UR STRIP-ACNC: <2 MG/DL
WBC #/AREA URNS AUTO: ABNORMAL /HPF

## 2022-07-15 PROCEDURE — 99203 OFFICE O/P NEW LOW 30 MIN: CPT | Performed by: NURSE PRACTITIONER

## 2022-07-15 PROCEDURE — 87086 URINE CULTURE/COLONY COUNT: CPT | Performed by: NURSE PRACTITIONER

## 2022-07-15 PROCEDURE — 3080F DIAST BP >= 90 MM HG: CPT | Performed by: NURSE PRACTITIONER

## 2022-07-15 PROCEDURE — 3077F SYST BP >= 140 MM HG: CPT | Performed by: NURSE PRACTITIONER

## 2022-07-15 PROCEDURE — 81001 URINALYSIS AUTO W/SCOPE: CPT | Performed by: NURSE PRACTITIONER

## 2022-07-15 PROCEDURE — 81002 URINALYSIS NONAUTO W/O SCOPE: CPT | Performed by: NURSE PRACTITIONER

## 2022-07-15 NOTE — PROGRESS NOTES
07/15/22    Ernesto Palacio   1980   [id number]     Assessment  1 Microscopic hematuria     Discussion/Plan  1 Microscopic hematuria    Urine dip in office: positive blood, ojhnson    Sent for UA micro and culture    Renal US and Xray ordered   Increase hydration with water, avoid bladder irritants     Patient will obtain imaging  Await urine studies  Follow up with PCP regarding rectal bleeding  Message sent to Dr Nellie Hu  Subjective  HPI   Ernesto Palacio is a 35year old male referred to us by PCP for microscopic hematuria  He is a nonsmoker  Denies family history of  malignancy  He admits to consumption of diet soda and inadequate water  He is trying to increase his water intake  He denies history of kidney stones  He is not necessarily concerned about his urination  He denies lower urinary tract symptoms  His biggest complaint and concern is a "tumor" on his rectum  He states it drains clear, white, brown or bloody fluid and he sees blood when he has a bowel movement  Component      Latest Ref Rng & Units 1/17/2022 4/22/2022 5/7/2022          11:53 AM  8:38 AM 10:34 AM   RBC, UA      None Seen, 1-2 /hpf None Seen 1-2 1-2     Review of Systems - History obtained from chart review and the patient  General ROS: negative  Psychological ROS: negative  Respiratory ROS: negative  Cardiovascular ROS: no chest pain or dyspnea on exertion  Gastrointestinal ROS: positive for - blood in stool   Genito-Urinary ROS: no dysuria, trouble voiding, or hematuria  Musculoskeletal ROS: negative  Neurological ROS: no TIA or stroke symptoms  Dermatological ROS: negative     Objective  Physical Exam  Vitals and nursing note reviewed  Constitutional:       General: He is awake  He is not in acute distress  Appearance: Normal appearance  He is well-developed, well-groomed and normal weight  He is not ill-appearing, toxic-appearing or diaphoretic     Pulmonary:      Effort: Pulmonary effort is normal  Abdominal:      Tenderness: There is no right CVA tenderness or left CVA tenderness  Musculoskeletal:         General: Normal range of motion  Cervical back: Normal range of motion and neck supple  Skin:     General: Skin is warm  Neurological:      General: No focal deficit present  Mental Status: He is alert and oriented to person, place, and time  Mental status is at baseline  Psychiatric:         Attention and Perception: Attention normal          Mood and Affect: Mood normal          Speech: Speech normal          Behavior: Behavior normal  Behavior is cooperative  Thought Content: Thought content normal          Cognition and Memory: Cognition normal          Judgment: Judgment normal            WILLIAM Potts     I have spent 15 minutes with Patient  today in which greater than 50% of this time was spent in counseling/coordination of care regarding Diagnostic results

## 2022-07-16 LAB — BACTERIA UR CULT: NORMAL

## 2022-07-20 ENCOUNTER — HOSPITAL ENCOUNTER (OUTPATIENT)
Dept: RADIOLOGY | Age: 42
Discharge: HOME | End: 2022-07-20
Payer: COMMERCIAL

## 2022-07-20 ENCOUNTER — APPOINTMENT (OUTPATIENT)
Dept: RADIOLOGY | Age: 42
End: 2022-07-20
Payer: COMMERCIAL

## 2022-07-20 DIAGNOSIS — R31.29 OTHER MICROSCOPIC HEMATURIA: ICD-10-CM

## 2022-07-20 PROCEDURE — 76770 US EXAM ABDO BACK WALL COMP: CPT

## 2022-07-20 PROCEDURE — 74018 RADEX ABDOMEN 1 VIEW: CPT

## 2022-07-21 ENCOUNTER — TELEPHONE (OUTPATIENT)
Dept: UROLOGY | Facility: AMBULATORY SURGERY CENTER | Age: 42
End: 2022-07-21

## 2022-07-21 NOTE — TELEPHONE ENCOUNTER
----- Message from [id number] Blanka Garcia sent at 7/21/2022  2:04 PM EDT -----  No significant findings noted on recent renal ultrasound  His urine testing showed no evidence of micro hematuria  Patient may follow up with our office on as-needed basis

## 2022-07-27 ENCOUNTER — OFFICE VISIT (OUTPATIENT)
Dept: INTERNAL MEDICINE CLINIC | Age: 42
End: 2022-07-27
Payer: COMMERCIAL

## 2022-07-27 VITALS
SYSTOLIC BLOOD PRESSURE: 150 MMHG | WEIGHT: 262.7 LBS | TEMPERATURE: 98.6 F | HEIGHT: 69 IN | BODY MASS INDEX: 38.91 KG/M2 | HEART RATE: 71 BPM | OXYGEN SATURATION: 96 % | DIASTOLIC BLOOD PRESSURE: 96 MMHG

## 2022-07-27 DIAGNOSIS — R74.01 TRANSAMINITIS: ICD-10-CM

## 2022-07-27 DIAGNOSIS — I10 UNCONTROLLED STAGE 2 HYPERTENSION: Primary | ICD-10-CM

## 2022-07-27 DIAGNOSIS — R73.03 PREDIABETES: ICD-10-CM

## 2022-07-27 DIAGNOSIS — K62.9 LESION OF PERIANAL AREA: ICD-10-CM

## 2022-07-27 DIAGNOSIS — K64.4 SKIN TAG OF PERIANAL REGION: ICD-10-CM

## 2022-07-27 DIAGNOSIS — E78.49 OTHER HYPERLIPIDEMIA: ICD-10-CM

## 2022-07-27 DIAGNOSIS — R31.29 OTHER MICROSCOPIC HEMATURIA: ICD-10-CM

## 2022-07-27 PROBLEM — R63.1 POLYDIPSIA: Status: RESOLVED | Noted: 2021-12-10 | Resolved: 2022-07-27

## 2022-07-27 PROBLEM — R35.89 POLYURIA: Status: RESOLVED | Noted: 2021-12-10 | Resolved: 2022-07-27

## 2022-07-27 PROBLEM — R82.71 BACTERIURIA: Status: RESOLVED | Noted: 2022-05-05 | Resolved: 2022-07-27

## 2022-07-27 PROCEDURE — 99214 OFFICE O/P EST MOD 30 MIN: CPT | Performed by: INTERNAL MEDICINE

## 2022-07-27 RX ORDER — AMLODIPINE BESYLATE 10 MG/1
10 TABLET ORAL DAILY
Qty: 90 TABLET | Refills: 1 | Status: SHIPPED | OUTPATIENT
Start: 2022-07-27

## 2022-07-27 RX ORDER — LOSARTAN POTASSIUM 100 MG/1
100 TABLET ORAL DAILY
Qty: 90 TABLET | Refills: 1 | Status: SHIPPED | OUTPATIENT
Start: 2022-07-27

## 2022-07-27 NOTE — PROGRESS NOTES
Assessment/Plan:      Essential hypertension  -improving but not optimally controlled   -will increase the dose of amlodipine from 5 mg to 10 mg daily   -will refill losartan  -continue with a low-salt diet and cut down on stress  -will complete his work from as requested    Microscopic hematuria  -patient had been referred to Urology but states that he wants to 1st of all take care of his perineal lesion   -he was counseled to follow-up with urology in the future to find out the cause of his microscopic hematuria    Transaminitis   - hepatitis panel was negative  - we will order a repeat LFT to monitor transaminitis    Hyperlipidemia   -stable   -continue with heart healthy diet and exercise   -will recheck a lipid panel at a later date    Perianal lesion/skin tag  -chronic and worsening   -possibly an external hemorrhoid  -will refer patient to colorectal surgery     Diagnoses and all orders for this visit:    Uncontrolled stage 2 hypertension  -     amLODIPine (NORVASC) 10 mg tablet; Take 1 tablet (10 mg total) by mouth daily  -     losartan (Cozaar) 100 MG tablet; Take 1 tablet (100 mg total) by mouth daily    Other microscopic hematuria    Prediabetes    Transaminitis  -     Hepatic function panel; Future    Other hyperlipidemia    Skin tag of perianal region  -     Ambulatory Referral to Colorectal Surgery; Future    Lesion of perianal area  -     Ambulatory Referral to Colorectal Surgery; Future    BMI 38 0-38 9,adult          BMI Counseling: Body mass index is 38 47 kg/m²  The BMI is above normal  Nutrition recommendations include consuming healthier snacks, limiting drinks that contain sugar, reducing intake of saturated and trans fat and reducing intake of cholesterol  Exercise recommendations include moderate physical activity 150 minutes/week  No pharmacotherapy was ordered  Patient referred to PCP  Rationale for BMI follow-up plan is due to patient being overweight or obese         Subjective: Patient ID: Ernesto Palacio is a 35 y o  male  HPI  Presents for a follow up visit regarding his essential hypertension, transaminitis, microscopic hematuria, hyperlipidemia, he also has a complaint today  Does not check his BP at home  Checked it at urology office and it was high and it was also high at work  Has been taking his meds every day and normally does not skip doses  Unfortunately, he states that he dropped some of his Losartan into his cereal does not know if they are still as effective  He also needs a form completed from his job regarding his being under treatment for his BP  Also c/o a swelling on the side of his anus that has been present for many years, up to 20 years  He states that fluid tends to come out of it from time to time, sometimes it discharges blood, sometimes a clear colored and sometimes a brownish fluid  Sometimes it is tender but is not tender right now  Has never had a colonoscopy  Denies constipation  Has never had an infection or an abscess in that region  He feels like it has gotten worse since he started working from home and has been sitting on a hard surface more  No gross hematuria but admits to occasional rectal bleeding jayesh when he wipes sometimes  The following portions of the patient's history were reviewed and updated as appropriate:   He  has no past medical history on file    He   Patient Active Problem List    Diagnosis Date Noted    Skin tag of perianal region 07/27/2022    Other hyperlipidemia 04/22/2022    Other microscopic hematuria 04/22/2022    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    Other proteinuria 02/03/2022    Transaminitis 02/03/2022    Prediabetes 02/03/2022    Elevated serum protein level 02/03/2022    Uncontrolled stage 2 hypertension 12/10/2021    Annual physical exam 12/10/2021    BMI 38 0-38 9,adult 12/10/2021    Closed low lateral malleolus fracture, left, with routine healing, subsequent encounter 03/19/2021     He  has no past surgical history on file  His family history includes Hypertension in his father and mother  He  reports that he has never smoked  He has never used smokeless tobacco  He reports current alcohol use  He reports that he does not use drugs  Current Outpatient Medications   Medication Sig Dispense Refill    amLODIPine (NORVASC) 10 mg tablet Take 1 tablet (10 mg total) by mouth daily 90 tablet 1    losartan (Cozaar) 100 MG tablet Take 1 tablet (100 mg total) by mouth daily 90 tablet 1     No current facility-administered medications for this visit  Current Outpatient Medications on File Prior to Visit   Medication Sig    [DISCONTINUED] amLODIPine (NORVASC) 5 mg tablet Take 1 tablet (5 mg total) by mouth daily    [DISCONTINUED] losartan (COZAAR) 50 mg tablet Take 2 tablets (100 mg total) by mouth daily     No current facility-administered medications on file prior to visit  He has No Known Allergies       Review of Systems   Constitutional: Negative for activity change, chills, fatigue, fever and unexpected weight change  HENT: Negative for ear pain, postnasal drip, rhinorrhea, sinus pressure and sore throat  Eyes: Negative for pain  Respiratory: Negative for cough, choking, chest tightness, shortness of breath and wheezing  Cardiovascular: Negative for chest pain, palpitations and leg swelling  Gastrointestinal: Positive for rectal pain (Occasional rectal pain with persistent chronic swelling)  Negative for abdominal pain, constipation, diarrhea, nausea and vomiting  Genitourinary: Negative for dysuria and hematuria  Musculoskeletal: Negative for arthralgias, back pain, gait problem, joint swelling, myalgias and neck stiffness  Skin: Negative for pallor and rash  Neurological: Negative for dizziness, tremors, seizures, syncope, light-headedness and headaches  Hematological: Negative for adenopathy  Psychiatric/Behavioral: Negative for behavioral problems           Objective:      BP 150/96 (BP Location: Left arm, Patient Position: Sitting, Cuff Size: Large)   Pulse 71   Temp 98 6 °F (37 °C) (Temporal)   Ht 5' 9 29" (1 76 m)   Wt 119 kg (262 lb 11 2 oz)   SpO2 96% Comment: room air  BMI 38 47 kg/m²          Physical Exam  Constitutional:       General: He is not in acute distress  Appearance: He is well-developed  He is not diaphoretic  HENT:      Head: Normocephalic and atraumatic  Right Ear: External ear normal       Left Ear: External ear normal       Nose: Nose normal       Mouth/Throat:      Pharynx: No oropharyngeal exudate  Comments: Mallampati class 4 oropharynx  Eyes:      General: No scleral icterus  Right eye: No discharge  Left eye: No discharge  Conjunctiva/sclera: Conjunctivae normal       Pupils: Pupils are equal, round, and reactive to light  Neck:      Thyroid: No thyromegaly  Vascular: No JVD  Trachea: No tracheal deviation  Cardiovascular:      Rate and Rhythm: Normal rate and regular rhythm  Heart sounds: Normal heart sounds  No murmur heard  No friction rub  No gallop  Pulmonary:      Effort: Pulmonary effort is normal  No respiratory distress  Breath sounds: Normal breath sounds  No wheezing or rales  Chest:      Chest wall: No tenderness  Abdominal:      General: Bowel sounds are normal  There is no distension  Palpations: Abdomen is soft  There is no mass  Tenderness: There is no abdominal tenderness  There is no guarding or rebound  Genitourinary:      Musculoskeletal:         General: No tenderness  Normal range of motion  Cervical back: Normal range of motion and neck supple  Thoracic back: Deformity (Thoracolumbar kyphosis) present  Lymphadenopathy:      Cervical: No cervical adenopathy  Skin:     General: Skin is warm and dry  Coloration: Skin is not pale  Findings: No erythema or rash     Neurological:      Mental Status: He is alert and oriented to person, place, and time  Cranial Nerves: No cranial nerve deficit  Motor: No abnormal muscle tone  Coordination: Coordination normal       Deep Tendon Reflexes: Reflexes are normal and symmetric     Psychiatric:         Behavior: Behavior normal            Office Visit on 07/15/2022   Component Date Value Ref Range Status    LEUKOCYTE ESTERASE,UA 07/15/2022 neg   Final    NITRITE,UA 07/15/2022 neg   Final    SL AMB POCT UROBILINOGEN 07/15/2022 0 2   Final    POCT URINE PROTEIN 07/15/2022 neg   Final     PH,UA 07/15/2022 5 0   Final    BLOOD,UA 07/15/2022 large   Final    SPECIFIC GRAVITY,UA 07/15/2022 1 015   Final    KETONES,UA 07/15/2022 neg   Final    BILIRUBIN,UA 07/15/2022 neg   Final    GLUCOSE, UA 07/15/2022 neg   Final     COLOR,UA 07/15/2022 yellow   Final    CLARITY,UA 07/15/2022 clear   Final    Color, UA 07/15/2022 Light Yellow   Final    Clarity, UA 07/15/2022 Clear   Final    Specific Gravity, UA 07/15/2022 1 017  1 003 - 1 030 Final    pH, UA 07/15/2022 6 0  4 5, 5 0, 5 5, 6 0, 6 5, 7 0, 7 5, 8 0 Final    Leukocytes, UA 07/15/2022 Negative  Negative Final    Nitrite, UA 07/15/2022 Negative  Negative Final    Protein, UA 07/15/2022 Negative  Negative mg/dl Final    Glucose, UA 07/15/2022 Negative  Negative mg/dl Final    Ketones, UA 07/15/2022 Negative  Negative mg/dl Final    Urobilinogen, UA 07/15/2022 <2 0  <2 0 mg/dl mg/dl Final    Bilirubin, UA 07/15/2022 Negative  Negative Final    Occult Blood, UA 07/15/2022 Small (A) Negative Final    RBC, UA 07/15/2022 1-2  None Seen, 1-2 /hpf Final    WBC, UA 07/15/2022 2-4 (A) None Seen, 1-2 /hpf Final    Epithelial Cells 07/15/2022 Occasional  None Seen, Occasional /hpf Final    Bacteria, UA 07/15/2022 None Seen  None Seen, Occasional /hpf Final    MUCUS THREADS 07/15/2022 Moderate (A) None Seen Final    Urine Culture 07/15/2022 No Growth <1000 cfu/mL   Final   Appointment on 05/07/2022   Component Date Value Ref Range Status    Color, UA 05/07/2022 Light Yellow   Final    Clarity, UA 05/07/2022 Clear   Final    Specific [place], UA 05/07/2022 1 013  1 003 - 1 030 Final    pH, UA 05/07/2022 5 5  4 5, 5 0, 5 5, 6 0, 6 5, 7 0, 7 5, 8 0 Final    Leukocytes, UA 05/07/2022 Negative  Negative Final    Nitrite, UA 05/07/2022 Negative  Negative Final    Protein, UA 05/07/2022 Negative  Negative mg/dl Final    Glucose, UA 05/07/2022 Negative  Negative mg/dl Final    Ketones, UA 05/07/2022 Negative  Negative mg/dl Final    Urobilinogen, UA 05/07/2022 <2 0  <2 0 mg/dl mg/dl Final    Bilirubin, UA 05/07/2022 Negative  Negative Final    Occult Blood, UA 05/07/2022 Small (A) Negative Final    RBC, UA 05/07/2022 1-2  None Seen, 1-2 /hpf Final    WBC, UA 05/07/2022 1-2  None Seen, 1-2 /hpf Final    Epithelial Cells 05/07/2022 None Seen  None Seen, Occasional /hpf Final    Bacteria, UA 05/07/2022 None Seen  None Seen, Occasional /hpf Final    MUCUS THREADS 05/07/2022 Occasional (A) None Seen Final    Hyaline Casts, UA 05/07/2022 0-3 (A) None Seen /lpf Final   Appointment on 04/22/2022   Component Date Value Ref Range Status    Color, UA 04/22/2022 Light [place]   Final    Clarity, UA 04/22/2022 Extra Turbid   Final    Specific Gravity, UA 04/22/2022 1 021  1 003 - 1 030 Final    pH, UA 04/22/2022 5 5  4 5, 5 0, 5 5, 6 0, 6 5, 7 0, 7 5, 8 0 Final    Leukocytes, UA 04/22/2022 Negative  Negative Final    Nitrite, UA 04/22/2022 Negative  Negative Final    Protein, UA 04/22/2022 Trace (A) Negative mg/dl Final    Glucose, UA 04/22/2022 Negative  Negative mg/dl Final    Ketones, UA 04/22/2022 Negative  Negative mg/dl Final    Urobilinogen, UA 04/22/2022 <2 0  <2 0 mg/dl mg/dl Final    Bilirubin, UA 04/22/2022 Negative  Negative Final    Occult Blood, UA 04/22/2022 Small (A) Negative Final    RBC, UA 04/22/2022 1-2  None Seen, 1-2 /hpf Final    WBC, UA 04/22/2022 1-2  None Seen, 1-2 /hpf Final    Epithelial Cells 04/22/2022 Occasional  None Seen, Occasional /hpf Final    Bacteria, UA 04/22/2022 Moderate (A) None Seen, Occasional /hpf Final    MUCUS THREADS 04/22/2022 Moderate (A) None Seen Final    Hepatitis B Surface Ag 04/22/2022 Non-reactive  Non-reactive, NonReactive - Confirmed Final    Hepatitis C Ab 04/22/2022 Non-reactive  Non-reactive Final    Hep B C IgM 04/22/2022 Non-reactive  Non-reactive Final    Hep B Core Total Ab 04/22/2022 Non-reactive  Non-reactive Final    Sodium 04/22/2022 141  136 - 145 mmol/L Final    Potassium 04/22/2022 4 3  3 5 - 5 3 mmol/L Final    Chloride 04/22/2022 109 (A) 100 - 108 mmol/L Final    CO2 04/22/2022 28  21 - 32 mmol/L Final    ANION GAP 04/22/2022 4  4 - 13 mmol/L Final    BUN 04/22/2022 12  5 - 25 mg/dL Final    Creatinine 04/22/2022 0 78  0 60 - 1 30 mg/dL Final    Standardized to IDMS reference method    Glucose, Fasting 04/22/2022 112 (A) 65 - 99 mg/dL Final    Specimen collection should occur prior to Sulfasalazine administration due to the potential for falsely depressed results  Specimen collection should occur prior to Sulfapyridine administration due to the potential for falsely elevated results   Calcium 04/22/2022 9 5  8 3 - 10 1 mg/dL Final    AST 04/22/2022 53 (A) 5 - 45 U/L Final    Specimen collection should occur prior to Sulfasalazine administration due to the potential for falsely depressed results   ALT 04/22/2022 122 (A) 12 - 78 U/L Final    Specimen collection should occur prior to Sulfasalazine and/or Sulfapyridine administration due to the potential for falsely depressed results   Alkaline Phosphatase 04/22/2022 80  46 - 116 U/L Final    Total Protein 04/22/2022 8 2  6 4 - 8 2 g/dL Final    Albumin 04/22/2022 3 6  3 5 - 5 0 g/dL Final    Total Bilirubin 04/22/2022 0 49  0 20 - 1 00 mg/dL Final    Use of this assay is not recommended for patients undergoing treatment with eltrombopag due to the potential for falsely elevated results      eGFR 04/22/2022 110  ml/min/1 73sq m Final   Appointment on 01/17/2022   Component Date Value Ref Range Status    WBC 01/17/2022 10 48 (A) 4 31 - 10 16 Thousand/uL Final    RBC 01/17/2022 4 93  3 88 - 5 62 Million/uL Final    Hemoglobin 01/17/2022 15 1  12 0 - 17 0 g/dL Final    Hematocrit 01/17/2022 46 9  36 5 - 49 3 % Final    MCV 01/17/2022 95  82 - 98 fL Final    MCH 01/17/2022 30 6  26 8 - 34 3 pg Final    MCHC 01/17/2022 32 2  31 4 - 37 4 g/dL Final    RDW 01/17/2022 13 5  11 6 - 15 1 % Final    MPV 01/17/2022 10 3  8 9 - 12 7 fL Final    Platelets 30/45/6855 289  149 - 390 Thousands/uL Final    nRBC 01/17/2022 0  /100 WBCs Final    Neutrophils Relative 01/17/2022 71  43 - 75 % Final    Immat GRANS % 01/17/2022 1  0 - 2 % Final    Lymphocytes Relative 01/17/2022 17  14 - 44 % Final    Monocytes Relative 01/17/2022 8  4 - 12 % Final    Eosinophils Relative 01/17/2022 2  0 - 6 % Final    Basophils Relative 01/17/2022 1  0 - 1 % Final    Neutrophils Absolute 01/17/2022 7 37  1 85 - 7 62 Thousands/µL Final    Immature Grans Absolute 01/17/2022 0 15  0 00 - 0 20 Thousand/uL Final    Lymphocytes Absolute 01/17/2022 1 82  0 60 - 4 47 Thousands/µL Final    Monocytes Absolute 01/17/2022 0 88  0 17 - 1 22 Thousand/µL Final    Eosinophils Absolute 01/17/2022 0 19  0 00 - 0 61 Thousand/µL Final    Basophils Absolute 01/17/2022 0 07  0 00 - 0 10 Thousands/µL Final    TSH 3RD GENERATON 01/17/2022 2 850  0 358 - 3 740 uIU/mL Final    Sodium 01/17/2022 138  136 - 145 mmol/L Final    Potassium 01/17/2022 4 1  3 5 - 5 3 mmol/L Final    Chloride 01/17/2022 108  100 - 108 mmol/L Final    CO2 01/17/2022 26  21 - 32 mmol/L Final    ANION GAP 01/17/2022 4  4 - 13 mmol/L Final    BUN 01/17/2022 14  5 - 25 mg/dL Final    Creatinine 01/17/2022 0 99  0 60 - 1 30 mg/dL Final    Standardized to IDMS reference method    Glucose 01/17/2022 101  65 - 140 mg/dL Final    If the patient is fasting, the ADA then defines impaired fasting glucose as > 100 mg/dL and diabetes as > or equal to 123 mg/dL  Specimen collection should occur prior to Sulfasalazine administration due to the potential for falsely depressed results  Specimen collection should occur prior to Sulfapyridine administration due to the potential for falsely elevated results   Calcium 01/17/2022 9 7  8 3 - 10 1 mg/dL Final    AST 01/17/2022 50 (A) 5 - 45 U/L Final    Specimen collection should occur prior to Sulfasalazine administration due to the potential for falsely depressed results   ALT 01/17/2022 115 (A) 12 - 78 U/L Final    Specimen collection should occur prior to Sulfasalazine and/or Sulfapyridine administration due to the potential for falsely depressed results   Alkaline Phosphatase 01/17/2022 81  46 - 116 U/L Final    Total Protein 01/17/2022 8 7 (A) 6 4 - 8 2 g/dL Final    Albumin 01/17/2022 3 9  3 5 - 5 0 g/dL Final    Total Bilirubin 01/17/2022 0 71  0 20 - 1 00 mg/dL Final    Use of this assay is not recommended for patients undergoing treatment with eltrombopag due to the potential for falsely elevated results   eGFR 01/17/2022 92  ml/min/1 73sq m Final    Cholesterol 01/17/2022 208 (A) See Comment mg/dL Final    Cholesterol:         Pediatric <18 Years        Desirable          <170 mg/dL      Borderline High    170-199 mg/dL      High               >=200 mg/dL        Adult >=18 Years            Desirable         <200 mg/dL      Borderline High   200-239 mg/dL      High              >239 mg/dL      Triglycerides 01/17/2022 93  See Comment mg/dL Final    Triglyceride:     0-9Y            <75mg/dL     10Y-17Y         <90 mg/dL       >=18Y     Normal          <150 mg/dL     Borderline High 150-199 mg/dL     High            200-499 mg/dL        Very High       >499 mg/dL    Specimen collection should occur prior to N-Acetylcysteine or Metamizole administration due to the potential for falsely depressed results      HDL, Direct 01/17/2022 51  >=40 mg/dL Final    Specimen collection should occur prior to Metamizole administration due to the potential for falsley depressed results   LDL Calculated 01/17/2022 138 (A) 0 - 100 mg/dL Final    LDL Cholesterol:     Optimal           <100 mg/dl     Near Optimal      100-129 mg/dl     Above Optimal       Borderline High 130-159 mg/dl       High            160-189 mg/dl       Very High       >189 mg/dl         This screening LDL is a calculated result  It does not have the accuracy of the Direct Measured LDL in the monitoring of patients with hyperlipidemia and/or statin therapy  Direct Measure LDL (OVF499) must be ordered separately in these patients   Non-HDL-Chol (CHOL-HDL) 01/17/2022 157  mg/dl Final    Hemoglobin A1C 01/17/2022 5 9 (A) Normal 3 8-5 6%; PreDiabetic 5 7-6 4%;  Diabetic >=6 5%; Glycemic control for adults with diabetes <7 0% % Final    EAG 01/17/2022 123  mg/dl Final    Clarity, UA 01/17/2022 Clear   Final    Color, UA 01/17/2022 Yellow   Final    Specific Gravity, UA 01/17/2022 >=1 030  1 003 - 1 030 Final    pH, UA 01/17/2022 5 5  4 5, 5 0, 5 5, 6 0, 6 5, 7 0, 7 5, 8 0 Final    Glucose, UA 01/17/2022 Negative  Negative mg/dl Final    Ketones, UA 01/17/2022 Negative  Negative mg/dl Final    Occult Blood, UA 01/17/2022 Small   Final    Protein, UA 01/17/2022 30 (1+) (A) Negative mg/dl Final    Nitrite, UA 01/17/2022 Negative  Negative Final    Bilirubin, UA 01/17/2022 Negative  Negative Final    Urobilinogen, UA 01/17/2022 0 2  0 2, 1 0 E U /dl E U /dl Final    Leukocytes, UA 01/17/2022 Negative  Negative Final    WBC, UA 01/17/2022 None Seen  None Seen, 2-4, 5-60 /hpf Final    RBC, UA 01/17/2022 None Seen  None Seen, 2-4 /hpf Final    Hyaline Casts, UA 01/17/2022 5-10 (A) None Seen /lpf Final    Bacteria, UA 01/17/2022 None Seen  None Seen, Occasional /hpf Final    Fine granular casts 01/17/2022 3-5  /lpf Final    Ca Oxalate Debbi, UA 01/17/2022 Occasional (A) None Seen /hpf Final    Epithelial Cells 01/17/2022 None Seen  None Seen, Occasional /hpf Final    MUCUS THREADS 01/17/2022 Moderate (A) None Seen Final    RBC Casts, UA 01/17/2022 3-5 (A) (none) /lpf Final

## 2022-09-16 ENCOUNTER — LAB (OUTPATIENT)
Dept: LAB | Age: 42
End: 2022-09-16
Payer: COMMERCIAL

## 2022-09-16 DIAGNOSIS — R74.01 TRANSAMINITIS: ICD-10-CM

## 2022-09-16 DIAGNOSIS — K64.4 SKIN TAG OF PERIANAL REGION: ICD-10-CM

## 2022-09-16 LAB
ALBUMIN SERPL BCP-MCNC: 3.6 G/DL (ref 3.5–5)
ALP SERPL-CCNC: 73 U/L (ref 46–116)
ALT SERPL W P-5'-P-CCNC: 108 U/L (ref 12–78)
ANION GAP SERPL CALCULATED.3IONS-SCNC: 7 MMOL/L (ref 4–13)
AST SERPL W P-5'-P-CCNC: 56 U/L (ref 5–45)
BASOPHILS # BLD AUTO: 0.12 THOUSANDS/ΜL (ref 0–0.1)
BASOPHILS NFR BLD AUTO: 1 % (ref 0–1)
BILIRUB DIRECT SERPL-MCNC: 0.06 MG/DL (ref 0–0.2)
BILIRUB SERPL-MCNC: 0.32 MG/DL (ref 0.2–1)
BUN SERPL-MCNC: 11 MG/DL (ref 5–25)
CALCIUM SERPL-MCNC: 9.4 MG/DL (ref 8.3–10.1)
CHLORIDE SERPL-SCNC: 108 MMOL/L (ref 96–108)
CO2 SERPL-SCNC: 23 MMOL/L (ref 21–32)
CREAT SERPL-MCNC: 0.77 MG/DL (ref 0.6–1.3)
EOSINOPHIL # BLD AUTO: 0.36 THOUSAND/ΜL (ref 0–0.61)
EOSINOPHIL NFR BLD AUTO: 3 % (ref 0–6)
ERYTHROCYTE [DISTWIDTH] IN BLOOD BY AUTOMATED COUNT: 13.2 % (ref 11.6–15.1)
GFR SERPL CREATININE-BSD FRML MDRD: 111 ML/MIN/1.73SQ M
GLUCOSE P FAST SERPL-MCNC: 97 MG/DL (ref 65–99)
HCT VFR BLD AUTO: 44.4 % (ref 36.5–49.3)
HGB BLD-MCNC: 14.1 G/DL (ref 12–17)
IMM GRANULOCYTES # BLD AUTO: 0.28 THOUSAND/UL (ref 0–0.2)
IMM GRANULOCYTES NFR BLD AUTO: 2 % (ref 0–2)
LYMPHOCYTES # BLD AUTO: 2.94 THOUSANDS/ΜL (ref 0.6–4.47)
LYMPHOCYTES NFR BLD AUTO: 24 % (ref 14–44)
MCH RBC QN AUTO: 30.7 PG (ref 26.8–34.3)
MCHC RBC AUTO-ENTMCNC: 31.8 G/DL (ref 31.4–37.4)
MCV RBC AUTO: 97 FL (ref 82–98)
MONOCYTES # BLD AUTO: 1.05 THOUSAND/ΜL (ref 0.17–1.22)
MONOCYTES NFR BLD AUTO: 9 % (ref 4–12)
NEUTROPHILS # BLD AUTO: 7.35 THOUSANDS/ΜL (ref 1.85–7.62)
NEUTS SEG NFR BLD AUTO: 61 % (ref 43–75)
NRBC BLD AUTO-RTO: 0 /100 WBCS
PLATELET # BLD AUTO: 341 THOUSANDS/UL (ref 149–390)
PMV BLD AUTO: 10.5 FL (ref 8.9–12.7)
POTASSIUM SERPL-SCNC: 4.1 MMOL/L (ref 3.5–5.3)
PROT SERPL-MCNC: 8.1 G/DL (ref 6.4–8.4)
RBC # BLD AUTO: 4.6 MILLION/UL (ref 3.88–5.62)
SODIUM SERPL-SCNC: 138 MMOL/L (ref 135–147)
WBC # BLD AUTO: 12.1 THOUSAND/UL (ref 4.31–10.16)

## 2022-09-16 PROCEDURE — 80048 BASIC METABOLIC PNL TOTAL CA: CPT

## 2022-09-16 PROCEDURE — 36415 COLL VENOUS BLD VENIPUNCTURE: CPT

## 2022-09-16 PROCEDURE — 85025 COMPLETE CBC W/AUTO DIFF WBC: CPT

## 2022-09-16 PROCEDURE — 80076 HEPATIC FUNCTION PANEL: CPT

## 2022-09-28 ENCOUNTER — OFFICE VISIT (OUTPATIENT)
Dept: INTERNAL MEDICINE CLINIC | Age: 42
End: 2022-09-28
Payer: COMMERCIAL

## 2022-09-28 VITALS
OXYGEN SATURATION: 96 % | HEIGHT: 69 IN | WEIGHT: 267.8 LBS | TEMPERATURE: 98.6 F | BODY MASS INDEX: 39.66 KG/M2 | DIASTOLIC BLOOD PRESSURE: 94 MMHG | HEART RATE: 79 BPM | SYSTOLIC BLOOD PRESSURE: 162 MMHG

## 2022-09-28 DIAGNOSIS — I10 UNCONTROLLED STAGE 2 HYPERTENSION: Primary | ICD-10-CM

## 2022-09-28 DIAGNOSIS — K64.4 SKIN TAG OF PERIANAL REGION: ICD-10-CM

## 2022-09-28 DIAGNOSIS — D72.828 OTHER ELEVATED WHITE BLOOD CELL (WBC) COUNT: ICD-10-CM

## 2022-09-28 DIAGNOSIS — R74.01 TRANSAMINITIS: ICD-10-CM

## 2022-09-28 DIAGNOSIS — R31.29 OTHER MICROSCOPIC HEMATURIA: ICD-10-CM

## 2022-09-28 PROBLEM — D72.829 LEUCOCYTOSIS: Status: ACTIVE | Noted: 2022-09-28

## 2022-09-28 PROCEDURE — 3077F SYST BP >= 140 MM HG: CPT | Performed by: INTERNAL MEDICINE

## 2022-09-28 PROCEDURE — 3080F DIAST BP >= 90 MM HG: CPT | Performed by: INTERNAL MEDICINE

## 2022-09-28 PROCEDURE — 99213 OFFICE O/P EST LOW 20 MIN: CPT | Performed by: INTERNAL MEDICINE

## 2022-09-28 RX ORDER — HYDROCHLOROTHIAZIDE 12.5 MG/1
12.5 TABLET ORAL DAILY
Qty: 90 TABLET | Refills: 1 | Status: SHIPPED | OUTPATIENT
Start: 2022-09-28

## 2022-09-28 NOTE — PROGRESS NOTES
Assessment/Plan:    Uncontrolled essential hypertension   -blood pressure is not controlled on maximum doses of amlodipine and losartan  On checking in, blood pressure is 162/94 and recheck was 140/100  -will start patient on hydrochlorothiazide 12 5 mg daily   -he was counseled to monitor his blood pressure in the morning and in the evening and to call the office if his blood pressure is consistently above 130/80  -I explained to patient the importance of checking his blood pressure at home in order to ensure that his medications are working adequately and to rule out white coat hypertension   -continue with a low-salt diet and exercise    Skin tag of perianal region   -patient is scheduled for surgery on October 12th, 2022   -follow-up with colorectal surgery    Transaminitis   -stable  -reviewed lab results done on September 16th, 2022 on answered all his questions   -it is possible that his mild stable elevation in AST and ALT is secondary to fatty liver   -patient was counseled to try diet and exercise for weight loss   -will recheck a CMP or hepatic function panel at his next visit and order an ultrasound of the right upper quadrant if transaminitis persists  Leukocytosis  -mildly worsened, CBC done on September 16th, 2022 showed a WBC of 12 10, up from 10 48 with mildly elevated absolute immature granulocytes of 0 28 and mildly elevated absolute basophils of 0 12  -patient has no symptoms or signs of infection with exception of his rectal lesion which may be the cause of this leukocytosis   -will continue to monitor him closely clinically     Microscopic hematuria  -patient denies any gross hematuria   -he does not want any further workup until his colorectal issues have resolved  -follow-up in 3 months     Diagnoses and all orders for this visit:    Uncontrolled stage 2 hypertension  -     hydrochlorothiazide (HYDRODIURIL) 12 5 mg tablet;  Take 1 tablet (12 5 mg total) by mouth daily    Skin tag of perianal region    Other microscopic hematuria    Transaminitis    Other elevated white blood cell (WBC) count          Subjective:      Patient ID: Elaine Sotomayor is a 35 y o  male  HPI  Pt presents for follow-up visit regarding his uncontrolled hypertension and perianal skin tag  He states that he has been taking his blood pressure medications in the am every day and states that he has not been skipping taking his meds and thinks that he has been good with taking them for the past 2 months  He states that he may occasionally skip doses   Does not check his bp at home  Has been cutting down on salt and does not drink coffee or tea but does drink soda - up to 4 a day  Did labs and wants to get the results  Still has the anal skin tag with occasional leaking of fluid and occasional bleeding and occasional anal pain   Scheduled for sx with colorectal surgery on 10/12/22  He had blood work done recently and wants to review the results  Of note, his WBC was mildly elevated  He states that he had a tooth extracted 3 months ago and occasionally has water coming out of his nose when he swishes water in his mouth and into the to socket  Extraction was done by [facility]  He is scheduled to follow-up with his dentist within the next 2 months  He denies seen any blood in his urine  He states that he does not want to follow-up with urology until he is done with his colorectal issues  He denies fever, chills, night sweats, headache, dizziness, chest pain, nasal congestion, rhinorrhea, sore throat, cough, chest pain, shortness a breath palpitations, nausea, vomiting, abdominal pain, diarrhea, constipation  The following portions of the patient's history were reviewed and updated as appropriate:   He  has a past medical history of Hypertension (12/2021)    He   Patient Active Problem List    Diagnosis Date Noted    Leucocytosis 09/28/2022    Skin tag of perianal region 07/27/2022    Other hyperlipidemia 04/22/2022    Other microscopic hematuria 04/22/2022    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    Other proteinuria 02/03/2022    Transaminitis 02/03/2022    Prediabetes 02/03/2022    Elevated serum protein level 02/03/2022    Uncontrolled stage 2 hypertension 12/10/2021    Annual physical exam 12/10/2021    BMI 38 0-38 9,adult 12/10/2021    Closed low lateral malleolus fracture, left, with routine healing, subsequent encounter 03/19/2021     He  has no past surgical history on file  His family history includes Cancer in his father; Diabetes in his father; Hypertension in his father and mother  He  reports that he has never smoked  He has never used smokeless tobacco  He reports current alcohol use  He reports that he does not use drugs  Current Outpatient Medications   Medication Sig Dispense Refill    amLODIPine (NORVASC) 10 mg tablet Take 1 tablet (10 mg total) by mouth daily 90 tablet 1    hydrochlorothiazide (HYDRODIURIL) 12 5 mg tablet Take 1 tablet (12 5 mg total) by mouth daily 90 tablet 1    losartan (Cozaar) 100 MG tablet Take 1 tablet (100 mg total) by mouth daily 90 tablet 1     No current facility-administered medications for this visit  Current Outpatient Medications on File Prior to Visit   Medication Sig    amLODIPine (NORVASC) 10 mg tablet Take 1 tablet (10 mg total) by mouth daily    losartan (Cozaar) 100 MG tablet Take 1 tablet (100 mg total) by mouth daily     No current facility-administered medications on file prior to visit  He has No Known Allergies       Review of Systems   Constitutional: Negative for activity change, chills, fatigue, fever and unexpected weight change  HENT: Negative for ear pain, postnasal drip, rhinorrhea, sinus pressure and sore throat  Eyes: Negative for pain  Respiratory: Negative for cough, choking, chest tightness, shortness of breath and wheezing  Cardiovascular: Negative for chest pain, palpitations and leg swelling     Gastrointestinal: Positive for anal bleeding (occasioanallty when he wipes after a BM and occasioanally blood will drip into the toilet bowl and with occasional pain and leakage of fluid) and rectal pain (occasional anal pain )  Negative for abdominal pain, blood in stool, constipation, diarrhea, nausea and vomiting  Genitourinary: Negative for dysuria and hematuria  Musculoskeletal: Negative for arthralgias, back pain, gait problem, joint swelling, myalgias and neck stiffness  Skin: Negative for pallor and rash  Neurological: Negative for dizziness, tremors, seizures, syncope, light-headedness and headaches  Hematological: Negative for adenopathy  Psychiatric/Behavioral: Negative for behavioral problems  Objective:      /94 (BP Location: Left arm, Patient Position: Sitting, Cuff Size: Large)   Pulse 79   Temp 98 6 °F (37 °C) (Temporal)   Ht 5' 9" (1 753 m)   Wt 121 kg (267 lb 12 8 oz)   SpO2 96% Comment: room air  BMI 39 55 kg/m²          Physical Exam  Constitutional:       General: He is not in acute distress  Appearance: He is well-developed  He is not diaphoretic  HENT:      Head: Normocephalic and atraumatic  Right Ear: External ear normal       Left Ear: External ear normal       Nose: Congestion present  Mouth/Throat:      Pharynx: Posterior oropharyngeal erythema (Mild oropharyngeal erythema with dry mucous membranes and Mallampati class 4 oropharynx) present  No oropharyngeal exudate  Eyes:      General: No scleral icterus  Right eye: No discharge  Left eye: No discharge  Conjunctiva/sclera: Conjunctivae normal       Pupils: Pupils are equal, round, and reactive to light  Neck:      Thyroid: No thyromegaly  Vascular: No JVD  Trachea: No tracheal deviation  Cardiovascular:      Rate and Rhythm: Normal rate and regular rhythm  Heart sounds: Normal heart sounds  No murmur heard  No friction rub  No gallop        Comments: Recheck of BP - 140/100  Pulmonary:      Effort: Pulmonary effort is normal  No respiratory distress  Breath sounds: Normal breath sounds  No wheezing or rales  Chest:      Chest wall: No tenderness  Abdominal:      General: Bowel sounds are normal  There is no distension  Palpations: Abdomen is soft  There is no mass  Tenderness: There is no abdominal tenderness  There is no guarding or rebound  Musculoskeletal:         General: No tenderness  Normal range of motion  Cervical back: Normal range of motion and neck supple  Thoracic back: Deformity ( thoracic kyphosis) present  Right lower le+ Pitting Edema ( 1+ pitting pedal edema in bilateral lower legs, left worse than right) present  Left lower le+ Pitting Edema present  Lymphadenopathy:      Cervical: No cervical adenopathy  Skin:     General: Skin is warm and dry  Coloration: Skin is not pale  Findings: No erythema or rash  Neurological:      Mental Status: He is alert and oriented to person, place, and time  Cranial Nerves: No cranial nerve deficit  Motor: No abnormal muscle tone  Coordination: Coordination normal       Deep Tendon Reflexes: Reflexes are normal and symmetric  Psychiatric:         Behavior: Behavior normal            Lab on 2022   Component Date Value Ref Range Status    Total Bilirubin 2022 0 32  0 20 - 1 00 mg/dL Final    Use of this assay is not recommended for patients undergoing treatment with eltrombopag due to the potential for falsely elevated results   Bilirubin, Direct 2022 0 06  0 00 - 0 20 mg/dL Final    Alkaline Phosphatase 2022 73  46 - 116 U/L Final    AST 2022 56 (A) 5 - 45 U/L Final    Slightly Hemolyzed; Results May be Affected  Specimen collection should occur prior to Sulfasalazine and/or Sulfapyridine administration due to the potential for falsely depressed results       ALT 2022 108 (A) 12 - 78 U/L Final    Specimen collection should occur prior to Sulfasalazine and/or Sulfapyridine administration due to the potential for falsely depressed results   Total Protein 09/16/2022 8 1  6 4 - 8 4 g/dL Final    Albumin 09/16/2022 3 6  3 5 - 5 0 g/dL Final    Sodium 09/16/2022 138  135 - 147 mmol/L Final    Potassium 09/16/2022 4 1  3 5 - 5 3 mmol/L Final    Slightly Hemolyzed; Results May be Affected    Chloride 09/16/2022 108  96 - 108 mmol/L Final    CO2 09/16/2022 23  21 - 32 mmol/L Final    ANION GAP 09/16/2022 7  4 - 13 mmol/L Final    BUN 09/16/2022 11  5 - 25 mg/dL Final    Creatinine 09/16/2022 0 77  0 60 - 1 30 mg/dL Final    Standardized to IDMS reference method    Glucose, Fasting 09/16/2022 97  65 - 99 mg/dL Final    Specimen collection should occur prior to Sulfasalazine administration due to the potential for falsely depressed results  Specimen collection should occur prior to Sulfapyridine administration due to the potential for falsely elevated results      Calcium 09/16/2022 9 4  8 3 - 10 1 mg/dL Final    eGFR 09/16/2022 111  ml/min/1 73sq m Final    WBC 09/16/2022 12 10 (A) 4 31 - 10 16 Thousand/uL Final    RBC 09/16/2022 4 60  3 88 - 5 62 Million/uL Final    Hemoglobin 09/16/2022 14 1  12 0 - 17 0 g/dL Final    Hematocrit 09/16/2022 44 4  36 5 - 49 3 % Final    MCV 09/16/2022 97  82 - 98 fL Final    MCH 09/16/2022 30 7  26 8 - 34 3 pg Final    MCHC 09/16/2022 31 8  31 4 - 37 4 g/dL Final    RDW 09/16/2022 13 2  11 6 - 15 1 % Final    MPV 09/16/2022 10 5  8 9 - 12 7 fL Final    Platelets 29/66/5741 341  149 - 390 Thousands/uL Final    nRBC 09/16/2022 0  /100 WBCs Final    Neutrophils Relative 09/16/2022 61  43 - 75 % Final    Immat GRANS % 09/16/2022 2  0 - 2 % Final    Lymphocytes Relative 09/16/2022 24  14 - 44 % Final    Monocytes Relative 09/16/2022 9  4 - 12 % Final    Eosinophils Relative 09/16/2022 3  0 - 6 % Final    Basophils Relative 09/16/2022 1  0 - 1 % Final    Neutrophils Absolute 09/16/2022 7 35  1 85 - 7 62 Thousands/µL Final    Immature Grans Absolute 09/16/2022 0 28 (A) 0 00 - 0 20 Thousand/uL Final    Lymphocytes Absolute 09/16/2022 2 94  0 60 - 4 47 Thousands/µL Final    Monocytes Absolute 09/16/2022 1 05  0 17 - 1 22 Thousand/µL Final    Eosinophils Absolute 09/16/2022 0 36  0 00 - 0 61 Thousand/µL Final    Basophils Absolute 09/16/2022 0 12 (A) 0 00 - 0 10 Thousands/µL Final   Office Visit on 07/15/2022   Component Date Value Ref Range Status    LEUKOCYTE ESTERASE,UA 07/15/2022 neg   Final    NITRITE,UA 07/15/2022 neg   Final    SL AMB POCT UROBILINOGEN 07/15/2022 0 2   Final    POCT URINE PROTEIN 07/15/2022 neg   Final     PH,UA 07/15/2022 5 0   Final    BLOOD,UA 07/15/2022 large   Final    SPECIFIC GRAVITY,UA 07/15/2022 1 015   Final    KETONES,UA 07/15/2022 neg   Final    BILIRUBIN,UA 07/15/2022 neg   Final    GLUCOSE, UA 07/15/2022 neg   Final     COLOR,UA 07/15/2022 yellow   Final    CLARITY,UA 07/15/2022 clear   Final    Color, UA 07/15/2022 Light Yellow   Final    Clarity, UA 07/15/2022 Clear   Final    Specific Gravity, UA 07/15/2022 1 017  1 003 - 1 030 Final    pH, UA 07/15/2022 6 0  4 5, 5 0, 5 5, 6 0, 6 5, 7 0, 7 5, 8 0 Final    Leukocytes, UA 07/15/2022 Negative  Negative Final    Nitrite, UA 07/15/2022 Negative  Negative Final    Protein, UA 07/15/2022 Negative  Negative mg/dl Final    Glucose, UA 07/15/2022 Negative  Negative mg/dl Final    Ketones, UA 07/15/2022 Negative  Negative mg/dl Final    Urobilinogen, UA 07/15/2022 <2 0  <2 0 mg/dl mg/dl Final    Bilirubin, UA 07/15/2022 Negative  Negative Final    Occult Blood, UA 07/15/2022 Small (A) Negative Final    RBC, UA 07/15/2022 1-2  None Seen, 1-2 /hpf Final    WBC, UA 07/15/2022 2-4 (A) None Seen, 1-2 /hpf Final    Epithelial Cells 07/15/2022 Occasional  None Seen, Occasional /hpf Final    Bacteria, UA 07/15/2022 None Seen  None Seen, Occasional /hpf Final    MUCUS THREADS 07/15/2022 Moderate (A) None Seen Final    Urine Culture 07/15/2022 No Growth <1000 cfu/mL   Final   Appointment on 05/07/2022   Component Date Value Ref Range Status    Color, UA 05/07/2022 Light Yellow   Final    Clarity, UA 05/07/2022 Clear   Final    Specific [place], UA 05/07/2022 1 013  1 003 - 1 030 Final    pH, UA 05/07/2022 5 5  4 5, 5 0, 5 5, 6 0, 6 5, 7 0, 7 5, 8 0 Final    Leukocytes, UA 05/07/2022 Negative  Negative Final    Nitrite, UA 05/07/2022 Negative  Negative Final    Protein, UA 05/07/2022 Negative  Negative mg/dl Final    Glucose, UA 05/07/2022 Negative  Negative mg/dl Final    Ketones, UA 05/07/2022 Negative  Negative mg/dl Final    Urobilinogen, UA 05/07/2022 <2 0  <2 0 mg/dl mg/dl Final    Bilirubin, UA 05/07/2022 Negative  Negative Final    Occult Blood, UA 05/07/2022 Small (A) Negative Final    RBC, UA 05/07/2022 1-2  None Seen, 1-2 /hpf Final    WBC, UA 05/07/2022 1-2  None Seen, 1-2 /hpf Final    Epithelial Cells 05/07/2022 None Seen  None Seen, Occasional /hpf Final    Bacteria, UA 05/07/2022 None Seen  None Seen, Occasional /hpf Final    MUCUS THREADS 05/07/2022 Occasional (A) None Seen Final    Hyaline Casts, UA 05/07/2022 0-3 (A) None Seen /lpf Final   Appointment on 04/22/2022   Component Date Value Ref Range Status    Color, UA 04/22/2022 Light [place]   Final    Clarity, UA 04/22/2022 Extra Turbid   Final    Specific Gravity, UA 04/22/2022 1 021  1 003 - 1 030 Final    pH, UA 04/22/2022 5 5  4 5, 5 0, 5 5, 6 0, 6 5, 7 0, 7 5, 8 0 Final    Leukocytes, UA 04/22/2022 Negative  Negative Final    Nitrite, UA 04/22/2022 Negative  Negative Final    Protein, UA 04/22/2022 Trace (A) Negative mg/dl Final    Glucose, UA 04/22/2022 Negative  Negative mg/dl Final    Ketones, UA 04/22/2022 Negative  Negative mg/dl Final    Urobilinogen, UA 04/22/2022 <2 0  <2 0 mg/dl mg/dl Final    Bilirubin, UA 04/22/2022 Negative  Negative Final    Occult Blood, UA 04/22/2022 Small (A) Negative Final    RBC, UA 04/22/2022 1-2  None Seen, 1-2 /hpf Final    WBC, UA 04/22/2022 1-2  None Seen, 1-2 /hpf Final    Epithelial Cells 04/22/2022 Occasional  None Seen, Occasional /hpf Final    Bacteria, UA 04/22/2022 Moderate (A) None Seen, Occasional /hpf Final    MUCUS THREADS 04/22/2022 Moderate (A) None Seen Final    Hepatitis B Surface Ag 04/22/2022 Non-reactive  Non-reactive, NonReactive - Confirmed Final    Hepatitis C Ab 04/22/2022 Non-reactive  Non-reactive Final    Hep B C IgM 04/22/2022 Non-reactive  Non-reactive Final    Hep B Core Total Ab 04/22/2022 Non-reactive  Non-reactive Final    Sodium 04/22/2022 141  136 - 145 mmol/L Final    Potassium 04/22/2022 4 3  3 5 - 5 3 mmol/L Final    Chloride 04/22/2022 109 (A) 100 - 108 mmol/L Final    CO2 04/22/2022 28  21 - 32 mmol/L Final    ANION GAP 04/22/2022 4  4 - 13 mmol/L Final    BUN 04/22/2022 12  5 - 25 mg/dL Final    Creatinine 04/22/2022 0 78  0 60 - 1 30 mg/dL Final    Standardized to IDMS reference method    Glucose, Fasting 04/22/2022 112 (A) 65 - 99 mg/dL Final    Specimen collection should occur prior to Sulfasalazine administration due to the potential for falsely depressed results  Specimen collection should occur prior to Sulfapyridine administration due to the potential for falsely elevated results   Calcium 04/22/2022 9 5  8 3 - 10 1 mg/dL Final    AST 04/22/2022 53 (A) 5 - 45 U/L Final    Specimen collection should occur prior to Sulfasalazine administration due to the potential for falsely depressed results   ALT 04/22/2022 122 (A) 12 - 78 U/L Final    Specimen collection should occur prior to Sulfasalazine and/or Sulfapyridine administration due to the potential for falsely depressed results       Alkaline Phosphatase 04/22/2022 80  46 - 116 U/L Final    Total Protein 04/22/2022 8 2  6 4 - 8 2 g/dL Final    Albumin 04/22/2022 3 6  3 5 - 5 0 g/dL Final    Total Bilirubin 04/22/2022 0 49  0 20 - 1 00 mg/dL Final    Use of this assay is not recommended for patients undergoing treatment with eltrombopag due to the potential for falsely elevated results      eGFR 04/22/2022 110  ml/min/1 73sq m Final   Appointment on 01/17/2022   Component Date Value Ref Range Status    WBC 01/17/2022 10 48 (A) 4 31 - 10 16 Thousand/uL Final    RBC 01/17/2022 4 93  3 88 - 5 62 Million/uL Final    Hemoglobin 01/17/2022 15 1  12 0 - 17 0 g/dL Final    Hematocrit 01/17/2022 46 9  36 5 - 49 3 % Final    MCV 01/17/2022 95  82 - 98 fL Final    MCH 01/17/2022 30 6  26 8 - 34 3 pg Final    MCHC 01/17/2022 32 2  31 4 - 37 4 g/dL Final    RDW 01/17/2022 13 5  11 6 - 15 1 % Final    MPV 01/17/2022 10 3  8 9 - 12 7 fL Final    Platelets 90/09/7472 289  149 - 390 Thousands/uL Final    nRBC 01/17/2022 0  /100 WBCs Final    Neutrophils Relative 01/17/2022 71  43 - 75 % Final    Immat GRANS % 01/17/2022 1  0 - 2 % Final    Lymphocytes Relative 01/17/2022 17  14 - 44 % Final    Monocytes Relative 01/17/2022 8  4 - 12 % Final    Eosinophils Relative 01/17/2022 2  0 - 6 % Final    Basophils Relative 01/17/2022 1  0 - 1 % Final    Neutrophils Absolute 01/17/2022 7 37  1 85 - 7 62 Thousands/µL Final    Immature Grans Absolute 01/17/2022 0 15  0 00 - 0 20 Thousand/uL Final    Lymphocytes Absolute 01/17/2022 1 82  0 60 - 4 47 Thousands/µL Final    Monocytes Absolute 01/17/2022 0 88  0 17 - 1 22 Thousand/µL Final    Eosinophils Absolute 01/17/2022 0 19  0 00 - 0 61 Thousand/µL Final    Basophils Absolute 01/17/2022 0 07  0 00 - 0 10 Thousands/µL Final    TSH 3RD GENERATON 01/17/2022 2 850  0 358 - 3 740 uIU/mL Final    Sodium 01/17/2022 138  136 - 145 mmol/L Final    Potassium 01/17/2022 4 1  3 5 - 5 3 mmol/L Final    Chloride 01/17/2022 108  100 - 108 mmol/L Final    CO2 01/17/2022 26  21 - 32 mmol/L Final    ANION GAP 01/17/2022 4  4 - 13 mmol/L Final    BUN 01/17/2022 14  5 - 25 mg/dL Final    Creatinine 01/17/2022 0  99  0 60 - 1 30 mg/dL Final    Standardized to IDMS reference method    Glucose 01/17/2022 101  65 - 140 mg/dL Final    If the patient is fasting, the ADA then defines impaired fasting glucose as > 100 mg/dL and diabetes as > or equal to 123 mg/dL  Specimen collection should occur prior to Sulfasalazine administration due to the potential for falsely depressed results  Specimen collection should occur prior to Sulfapyridine administration due to the potential for falsely elevated results   Calcium 01/17/2022 9 7  8 3 - 10 1 mg/dL Final    AST 01/17/2022 50 (A) 5 - 45 U/L Final    Specimen collection should occur prior to Sulfasalazine administration due to the potential for falsely depressed results   ALT 01/17/2022 115 (A) 12 - 78 U/L Final    Specimen collection should occur prior to Sulfasalazine and/or Sulfapyridine administration due to the potential for falsely depressed results   Alkaline Phosphatase 01/17/2022 81  46 - 116 U/L Final    Total Protein 01/17/2022 8 7 (A) 6 4 - 8 2 g/dL Final    Albumin 01/17/2022 3 9  3 5 - 5 0 g/dL Final    Total Bilirubin 01/17/2022 0 71  0 20 - 1 00 mg/dL Final    Use of this assay is not recommended for patients undergoing treatment with eltrombopag due to the potential for falsely elevated results      eGFR 01/17/2022 92  ml/min/1 73sq m Final    Cholesterol 01/17/2022 208 (A) See Comment mg/dL Final    Cholesterol:         Pediatric <18 Years        Desirable          <170 mg/dL      Borderline High    170-199 mg/dL      High               >=200 mg/dL        Adult >=18 Years            Desirable         <200 mg/dL      Borderline High   200-239 mg/dL      High              >239 mg/dL      Triglycerides 01/17/2022 93  See Comment mg/dL Final    Triglyceride:     0-9Y            <75mg/dL     10Y-17Y         <90 mg/dL       >=18Y     Normal          <150 mg/dL     Borderline High 150-199 mg/dL     High            200-499 mg/dL        Very High       >499 mg/dL    Specimen collection should occur prior to N-Acetylcysteine or Metamizole administration due to the potential for falsely depressed results   HDL, Direct 01/17/2022 51  >=40 mg/dL Final    Specimen collection should occur prior to Metamizole administration due to the potential for falsley depressed results   LDL Calculated 01/17/2022 138 (A) 0 - 100 mg/dL Final    LDL Cholesterol:     Optimal           <100 mg/dl     Near Optimal      100-129 mg/dl     Above Optimal       Borderline High 130-159 mg/dl       High            160-189 mg/dl       Very High       >189 mg/dl         This screening LDL is a calculated result  It does not have the accuracy of the Direct Measured LDL in the monitoring of patients with hyperlipidemia and/or statin therapy  Direct Measure LDL (JMR188) must be ordered separately in these patients   Non-HDL-Chol (CHOL-HDL) 01/17/2022 157  mg/dl Final    Hemoglobin A1C 01/17/2022 5 9 (A) Normal 3 8-5 6%; PreDiabetic 5 7-6 4%;  Diabetic >=6 5%; Glycemic control for adults with diabetes <7 0% % Final    EAG 01/17/2022 123  mg/dl Final    Clarity, UA 01/17/2022 Clear   Final    Color, UA 01/17/2022 Yellow   Final    Specific Gravity, UA 01/17/2022 >=1 030  1 003 - 1 030 Final    pH, UA 01/17/2022 5 5  4 5, 5 0, 5 5, 6 0, 6 5, 7 0, 7 5, 8 0 Final    Glucose, UA 01/17/2022 Negative  Negative mg/dl Final    Ketones, UA 01/17/2022 Negative  Negative mg/dl Final    Occult Blood, UA 01/17/2022 Small   Final    Protein, UA 01/17/2022 30 (1+) (A) Negative mg/dl Final    Nitrite, UA 01/17/2022 Negative  Negative Final    Bilirubin, UA 01/17/2022 Negative  Negative Final    Urobilinogen, UA 01/17/2022 0 2  0 2, 1 0 E U /dl E U /dl Final    Leukocytes, UA 01/17/2022 Negative  Negative Final    WBC, UA 01/17/2022 None Seen  None Seen, 2-4, 5-60 /hpf Final    RBC, UA 01/17/2022 None Seen  None Seen, 2-4 /hpf Final    Hyaline Casts, UA 01/17/2022 5-10 (A) None Seen /lpf Final  Bacteria, UA 01/17/2022 None Seen  None Seen, Occasional /hpf Final    Fine granular casts 01/17/2022 3-5  /lpf Final    Ca Oxalate Debbi, UA 01/17/2022 Occasional (A) None Seen /hpf Final    Epithelial Cells 01/17/2022 None Seen  None Seen, Occasional /hpf Final    MUCUS THREADS 01/17/2022 Moderate (A) None Seen Final    RBC Casts, UA 01/17/2022 3-5 (A) (none) /lpf Final

## 2022-09-28 NOTE — PATIENT INSTRUCTIONS
WE HAVE STARTED YOU ON A NEW BP MEDICATION - HYDROCHLOROTHIAZIDE 12 5 MG DAILY  PLEASE CONTINUE TAKING YOUR LOSARTAN AND AMLODIPINE    PLEASE CHECK YOUR BLOOD PRESSURE TWICE A DAY, IN THE MORNING AND IN THE EVENING AND KEEP A BLOOD PRESSURE LOG  GOAL BLOOD PRESSURE /80 AND BELOW  PLEASE CALL THE OFFICE IF BLOOD PRESSURE IS CONSISTENTLY ABOVE 130/80 
Negative

## 2022-10-04 NOTE — PRE-PROCEDURE INSTRUCTIONS
Pre-Surgery Instructions:   Medication Instructions   • amLODIPine (NORVASC) 10 mg tablet Take day of surgery  • hydrochlorothiazide (HYDRODIURIL) 12 5 mg tablet Hold day of surgery  • losartan (Cozaar) 100 MG tablet Hold day of surgery  Have you had / have a sore throat? No  Have you had / have a cough less than 1 week? No  Have you had / have a fever greater than 100 0 - 100  4? No  Are you experiencing any shortness of breath? No    Review with patient via phone medications and showering instructions  Instructed to avoid all ASA and OTC Vit/Supp 1 week prior to surgery and to avoid NSAIDs 3 days prior to surgery per anesthesia instructions  Tylenol ok to take prn  Advised ASC call with surgery schedule time, nothing eat or drink after midnight  Verbalized understanding  Aware bowel prep order by surgeon

## 2022-10-11 ENCOUNTER — ANESTHESIA EVENT (OUTPATIENT)
Dept: PERIOP | Facility: HOSPITAL | Age: 42
End: 2022-10-11
Payer: COMMERCIAL

## 2022-10-12 ENCOUNTER — ANESTHESIA (OUTPATIENT)
Dept: PERIOP | Facility: HOSPITAL | Age: 42
End: 2022-10-12
Payer: COMMERCIAL

## 2022-10-12 ENCOUNTER — HOSPITAL ENCOUNTER (OUTPATIENT)
Facility: HOSPITAL | Age: 42
Setting detail: OUTPATIENT SURGERY
Discharge: HOME | End: 2022-10-12
Attending: COLON & RECTAL SURGERY | Admitting: COLON & RECTAL SURGERY
Payer: COMMERCIAL

## 2022-10-12 VITALS
HEIGHT: 69 IN | DIASTOLIC BLOOD PRESSURE: 62 MMHG | WEIGHT: 267 LBS | BODY MASS INDEX: 39.55 KG/M2 | SYSTOLIC BLOOD PRESSURE: 135 MMHG | RESPIRATION RATE: 16 BRPM | OXYGEN SATURATION: 95 % | TEMPERATURE: 97.8 F | HEART RATE: 75 BPM

## 2022-10-12 DIAGNOSIS — K64.4 SKIN TAG OF PERIANAL REGION: Primary | ICD-10-CM

## 2022-10-12 PROCEDURE — 45990 SURG DX EXAM ANORECTAL: CPT | Performed by: COLON & RECTAL SURGERY

## 2022-10-12 PROCEDURE — 88304 TISSUE EXAM BY PATHOLOGIST: CPT | Performed by: PATHOLOGY

## 2022-10-12 PROCEDURE — 46280 REMOVE ANAL FIST COMPLEX: CPT | Performed by: COLON & RECTAL SURGERY

## 2022-10-12 PROCEDURE — C9290 INJ, BUPIVACAINE LIPOSOME: HCPCS | Performed by: COLON & RECTAL SURGERY

## 2022-10-12 RX ORDER — GLYCOPYRROLATE 0.2 MG/ML
INJECTION INTRAMUSCULAR; INTRAVENOUS AS NEEDED
Status: DISCONTINUED | OUTPATIENT
Start: 2022-10-12 | End: 2022-10-12

## 2022-10-12 RX ORDER — FENTANYL CITRATE/PF 50 MCG/ML
25 SYRINGE (ML) INJECTION
Status: DISCONTINUED | OUTPATIENT
Start: 2022-10-12 | End: 2022-10-12 | Stop reason: HOSPADM

## 2022-10-12 RX ORDER — DEXAMETHASONE SODIUM PHOSPHATE 10 MG/ML
INJECTION, SOLUTION INTRAMUSCULAR; INTRAVENOUS AS NEEDED
Status: DISCONTINUED | OUTPATIENT
Start: 2022-10-12 | End: 2022-10-12

## 2022-10-12 RX ORDER — LIDOCAINE HYDROCHLORIDE 10 MG/ML
INJECTION, SOLUTION EPIDURAL; INFILTRATION; INTRACAUDAL; PERINEURAL AS NEEDED
Status: DISCONTINUED | OUTPATIENT
Start: 2022-10-12 | End: 2022-10-12

## 2022-10-12 RX ORDER — OXYCODONE HYDROCHLORIDE 5 MG/1
5 TABLET ORAL EVERY 4 HOURS PRN
Status: DISCONTINUED | OUTPATIENT
Start: 2022-10-12 | End: 2022-10-12 | Stop reason: HOSPADM

## 2022-10-12 RX ORDER — HYDROMORPHONE HCL/PF 1 MG/ML
0.4 SYRINGE (ML) INJECTION
Status: DISCONTINUED | OUTPATIENT
Start: 2022-10-12 | End: 2022-10-12 | Stop reason: HOSPADM

## 2022-10-12 RX ORDER — OXYCODONE HYDROCHLORIDE 5 MG/1
5 TABLET ORAL EVERY 4 HOURS PRN
Qty: 25 TABLET | Refills: 0 | Status: SHIPPED | OUTPATIENT
Start: 2022-10-12 | End: 2022-10-22

## 2022-10-12 RX ORDER — PROPOFOL 10 MG/ML
INJECTION, EMULSION INTRAVENOUS AS NEEDED
Status: DISCONTINUED | OUTPATIENT
Start: 2022-10-12 | End: 2022-10-12

## 2022-10-12 RX ORDER — FENTANYL CITRATE 50 UG/ML
INJECTION, SOLUTION INTRAMUSCULAR; INTRAVENOUS AS NEEDED
Status: DISCONTINUED | OUTPATIENT
Start: 2022-10-12 | End: 2022-10-12

## 2022-10-12 RX ORDER — BUPIVACAINE HYDROCHLORIDE AND EPINEPHRINE 2.5; 5 MG/ML; UG/ML
INJECTION, SOLUTION EPIDURAL; INFILTRATION; INTRACAUDAL; PERINEURAL AS NEEDED
Status: DISCONTINUED | OUTPATIENT
Start: 2022-10-12 | End: 2022-10-12 | Stop reason: HOSPADM

## 2022-10-12 RX ORDER — SUCCINYLCHOLINE/SOD CL,ISO/PF 100 MG/5ML
SYRINGE (ML) INTRAVENOUS AS NEEDED
Status: DISCONTINUED | OUTPATIENT
Start: 2022-10-12 | End: 2022-10-12

## 2022-10-12 RX ORDER — ROCURONIUM BROMIDE 10 MG/ML
INJECTION, SOLUTION INTRAVENOUS AS NEEDED
Status: DISCONTINUED | OUTPATIENT
Start: 2022-10-12 | End: 2022-10-12

## 2022-10-12 RX ORDER — OXYCODONE HYDROCHLORIDE 5 MG/1
5 TABLET ORAL EVERY 4 HOURS PRN
Status: DISCONTINUED | OUTPATIENT
Start: 2022-10-12 | End: 2022-10-12 | Stop reason: SDUPTHER

## 2022-10-12 RX ORDER — ONDANSETRON 2 MG/ML
4 INJECTION INTRAMUSCULAR; INTRAVENOUS ONCE AS NEEDED
Status: DISCONTINUED | OUTPATIENT
Start: 2022-10-12 | End: 2022-10-12 | Stop reason: HOSPADM

## 2022-10-12 RX ORDER — SODIUM CHLORIDE, SODIUM LACTATE, POTASSIUM CHLORIDE, CALCIUM CHLORIDE 600; 310; 30; 20 MG/100ML; MG/100ML; MG/100ML; MG/100ML
50 INJECTION, SOLUTION INTRAVENOUS CONTINUOUS
Status: DISCONTINUED | OUTPATIENT
Start: 2022-10-12 | End: 2022-10-12 | Stop reason: HOSPADM

## 2022-10-12 RX ORDER — MIDAZOLAM HYDROCHLORIDE 2 MG/2ML
INJECTION, SOLUTION INTRAMUSCULAR; INTRAVENOUS AS NEEDED
Status: DISCONTINUED | OUTPATIENT
Start: 2022-10-12 | End: 2022-10-12

## 2022-10-12 RX ORDER — ONDANSETRON 2 MG/ML
INJECTION INTRAMUSCULAR; INTRAVENOUS AS NEEDED
Status: DISCONTINUED | OUTPATIENT
Start: 2022-10-12 | End: 2022-10-12

## 2022-10-12 RX ADMIN — FENTANYL CITRATE 50 MCG: 0.05 INJECTION, SOLUTION INTRAMUSCULAR; INTRAVENOUS at 09:12

## 2022-10-12 RX ADMIN — PROPOFOL 200 MG: 10 INJECTION, EMULSION INTRAVENOUS at 09:14

## 2022-10-12 RX ADMIN — LIDOCAINE HYDROCHLORIDE 50 MG: 10 INJECTION, SOLUTION EPIDURAL; INFILTRATION; INTRACAUDAL at 09:14

## 2022-10-12 RX ADMIN — FENTANYL CITRATE 50 MCG: 0.05 INJECTION, SOLUTION INTRAMUSCULAR; INTRAVENOUS at 10:07

## 2022-10-12 RX ADMIN — Medication 100 MG: at 09:15

## 2022-10-12 RX ADMIN — PROPOFOL 50 MG: 10 INJECTION, EMULSION INTRAVENOUS at 09:30

## 2022-10-12 RX ADMIN — LIDOCAINE HYDROCHLORIDE 50 MG: 10 INJECTION, SOLUTION EPIDURAL; INFILTRATION; INTRACAUDAL; PERINEURAL at 09:14

## 2022-10-12 RX ADMIN — FENTANYL CITRATE 50 MCG: 0.05 INJECTION, SOLUTION INTRAMUSCULAR; INTRAVENOUS at 09:14

## 2022-10-12 RX ADMIN — SODIUM CHLORIDE, SODIUM LACTATE, POTASSIUM CHLORIDE, AND CALCIUM CHLORIDE: .6; .31; .03; .02 INJECTION, SOLUTION INTRAVENOUS at 09:14

## 2022-10-12 RX ADMIN — DEXAMETHASONE SODIUM PHOSPHATE 10 MG: 10 INJECTION, SOLUTION INTRAMUSCULAR; INTRAVENOUS at 09:14

## 2022-10-12 RX ADMIN — ONDANSETRON 4 MG: 2 INJECTION INTRAMUSCULAR; INTRAVENOUS at 09:10

## 2022-10-12 RX ADMIN — ROCURONIUM BROMIDE 10 MG: 50 INJECTION INTRAVENOUS at 09:14

## 2022-10-12 RX ADMIN — MIDAZOLAM 2 MG: 1 INJECTION INTRAMUSCULAR; INTRAVENOUS at 09:10

## 2022-10-12 RX ADMIN — GLYCOPYRROLATE 0.2 MG: 0.2 INJECTION, SOLUTION INTRAMUSCULAR; INTRAVENOUS at 09:30

## 2022-10-12 RX ADMIN — FENTANYL CITRATE 50 MCG: 0.05 INJECTION, SOLUTION INTRAMUSCULAR; INTRAVENOUS at 09:39

## 2022-10-12 NOTE — DISCHARGE INSTRUCTIONS
May shower or tub bathe beginning today  Ensure you are not getting constipated using Metamucil 1 tablespoon 1-2 times daily with plenty of hydration  Pain medication as prescribed for pain may be combined with Tylenol  May use ibuprofen as well     Call for worsening pain, heavy bleeding, inability to urinate or fever greater than 101 5  4 weeks follow-up Dr Melissa Butcher [phone]

## 2022-10-12 NOTE — OP NOTE
OPERATIVE REPORT  PATIENT NAME: Maribel Villeda    :  1980  MRN: [id number]  Pt Location: [facility] OR ROOM 07    SURGERY DATE: 10/12/2022    Surgeon(s) and Role:     * Brea Pradhan MD - Primary     * Marcello Murphy MD - Assisting    Preop Diagnosis:  Skin tag of perianal region [K64 4]    Post-Op Diagnosis Codes:     * Skin tag of perianal region [K64 4]    Procedure(s) (LRB):  EXAM UNDER ANESTHESIA (EUA), partial fistulotomy (N/A)  PARTIAL FISTULOTOMY; INNER SPHINCTER FISTULA TRACT LIGATION (N/A)    Specimen(s):  ID Type Source Tests Collected by Time Destination   1 : Perianal Skin Tag Tissue Soft Tissue, Other TISSUE EXAM Brea Pradhan MD 10/12/2022  9:36 AM        Estimated Blood Loss:   Minimal    Drains:  * No LDAs found *    Anesthesia Type:   General    Operative Indications:  Skin tag of perianal region [K64 4]    Operative Findings:  Transsphincteric fistula in ANO    Complications:   None    Procedure and Technique:  After preoperative identification in the preoperative holding area, the patient was taken the operating room placed in the supine position  Following introduction of general anesthesia the patient was in place in the prone jackknife position with buttocks taped apart  Patient was prepped and draped in usual sterile fashion  Timeout was undertaken and procedure begun  Examination was performed  The right anterior lateral quadrant proximally 5 cm from the anal verge  This had a skin tag and granulation tissue at its base  There is a palpable cord present  This had a tracking subcutaneous component that extended towards the anus  This tract was injected with hydrogen peroxide  In the right anterior lateral quadrant, at the dentate line, peroxide could be seen  Skin was excised over the most distal part of this tract  On the more proximal part, it became obvious that this was not a superficial subcutaneous fistulotomy but rather transfer turned fistula  As such the intersphincteric groove was opened  We were able to circumferentially come around the intersphincteric tract  This was suture ligated using 3-0 PDS suture  Reinjection of the tract with peroxide revealed no leak  The tract was then marsupialized  Local field block was then obtained using bupivacaine and Exparel  Dry sterile dressing was applied  Patient was awaken from anesthesia and transferred to recovery room stable condition       I was present for the entire procedure    Patient Disposition:  PACU         SIGNATURE: Lisbeth Starks MD  DATE: October 12, 2022  TIME: 10:03 AM

## 2022-10-12 NOTE — ANESTHESIA PREPROCEDURE EVALUATION
Procedure:  EXAM UNDER ANESTHESIA (EUA), possible fistulotomy (N/A Anus)  FISTULOTOMY (N/A Anus)    Relevant Problems   CARDIO   (+) Other hyperlipidemia   (+) Uncontrolled stage 2 hypertension      Other   (+) BMI 38 0-38 9,adult   (+) Prediabetes        Physical Exam    Airway    Mallampati score: III  TM Distance: >3 FB  Neck ROM: full     Dental   No notable dental hx     Cardiovascular      Pulmonary      Other Findings        Anesthesia Plan  ASA Score- 2     Anesthesia Type- general with ASA Monitors  Additional Monitors:   Airway Plan: ETT  Plan Factors-Exercise tolerance (METS): >4 METS  Chart reviewed  Existing labs reviewed  Patient summary reviewed  Patient is not a current smoker  Induction- intravenous  Postoperative Plan- Plan for postoperative opioid use  Planned trial extubation    Informed Consent- Anesthetic plan and risks discussed with patient  I personally reviewed this patient with the CRNA  Discussed and agreed on the Anesthesia Plan with the CRNA  Kathrin Smith

## 2022-10-12 NOTE — H&P
History and Physical   Colon and Rectal Surgery   Raul Miranda 35 y o  male MRN: [id number]  Unit/Bed#: OR [place] Encounter: [id number]  10/12/22   @NOW    No chief complaint on file  History of Present Illness   HPI:  Raul Miranda is a 35 y o  male who presents for surgical treatment of suspected fistula in [facility] [place] [address]   Past Medical History:   Diagnosis Date   • Hypertension 12/2021     Past Surgical History:   Procedure Laterality Date   • NO PAST SURGERIES         Meds/Allergies     Medications Prior to Admission   Medication   • amLODIPine (NORVASC) 10 mg tablet   • hydrochlorothiazide (HYDRODIURIL) 12 5 mg tablet   • losartan (Cozaar) 100 MG tablet       No current facility-administered medications for this encounter  No Known Allergies      Social History   Social History     Substance and Sexual Activity   Alcohol Use Yes    Comment: rare     Social History     Substance and Sexual Activity   Drug Use Never     Social History     Tobacco Use   Smoking Status Never Smoker   Smokeless Tobacco Never Used         Family History:   Family History   Problem Relation Age of Onset   • Hypertension Mother    • Hypertension Father    • Diabetes Father    • Cancer Father          Objective     Current Vitals:      No intake or output data in the 24 hours ending 10/12/22 0836    Physical Exam:  General:  Resting comfortably in bed   Eyes:Sclera anicteric  ENT: Trachea midline  Pulm:  Symmetric chest raise  No respiratory Distress  CV:  Regular on monitor  Abdomen:  Soft NT ND  Extremities:  No clubbing/ cyanosis/ edema    Lab Results: I have personally reviewed pertinent lab results  Imaging: I have personally reviewed pertinent reports  ASSESSMENT:  Raul Miranda is a 35 y o  male who presents for outpatient surgical treatment of suspected fistula in [place] [address]  Plan is for exam under anesthesia, possible fistulotomy/treatment of fistula in ANO    Risks of anal surgery, including but not limited to pain, bleeding, failure to heal properly, fecal incontinence, persistent or recurrent anal disease, infection, fistula, abscess were reviewed  Questions were answered

## 2022-10-12 NOTE — ANESTHESIA POSTPROCEDURE EVALUATION
Post-Op Assessment Note    CV Status:  Stable    Pain management: adequate     Mental Status:  Alert and awake   Hydration Status:  Euvolemic   PONV Controlled:  Controlled   Airway Patency:  Patent   There is a medical reason for not screening for obstructive sleep apnea and/or for not using two or more mitigation strategies   Post Op Vitals Reviewed: Yes      Staff: Anesthesiologist, CRNA         No complications documented      BP     Temp   97 5   Pulse  78   Resp   14   SpO2   98

## 2022-10-26 PROCEDURE — 88304 TISSUE EXAM BY PATHOLOGIST: CPT | Performed by: PATHOLOGY

## 2022-12-16 ENCOUNTER — OFFICE VISIT (OUTPATIENT)
Dept: INTERNAL MEDICINE CLINIC | Age: 42
End: 2022-12-16

## 2022-12-16 VITALS
BODY MASS INDEX: 41 KG/M2 | SYSTOLIC BLOOD PRESSURE: 130 MMHG | OXYGEN SATURATION: 97 % | HEART RATE: 76 BPM | HEIGHT: 69 IN | TEMPERATURE: 98.8 F | DIASTOLIC BLOOD PRESSURE: 88 MMHG | WEIGHT: 276.8 LBS

## 2022-12-16 DIAGNOSIS — Z00.00 ANNUAL PHYSICAL EXAM: ICD-10-CM

## 2022-12-16 DIAGNOSIS — I10 ESSENTIAL HYPERTENSION: ICD-10-CM

## 2022-12-16 DIAGNOSIS — R31.29 OTHER MICROSCOPIC HEMATURIA: ICD-10-CM

## 2022-12-16 DIAGNOSIS — Z23 NEED FOR INFLUENZA VACCINATION: ICD-10-CM

## 2022-12-16 DIAGNOSIS — E78.49 OTHER HYPERLIPIDEMIA: ICD-10-CM

## 2022-12-16 DIAGNOSIS — D72.828 OTHER ELEVATED WHITE BLOOD CELL (WBC) COUNT: ICD-10-CM

## 2022-12-16 DIAGNOSIS — I10 UNCONTROLLED STAGE 2 HYPERTENSION: Primary | ICD-10-CM

## 2022-12-16 DIAGNOSIS — R74.01 TRANSAMINITIS: ICD-10-CM

## 2022-12-16 DIAGNOSIS — K64.4 SKIN TAG OF PERIANAL REGION: ICD-10-CM

## 2022-12-16 RX ORDER — LOSARTAN POTASSIUM AND HYDROCHLOROTHIAZIDE 25; 100 MG/1; MG/1
1 TABLET ORAL DAILY
Qty: 90 TABLET | Refills: 1 | Status: SHIPPED | OUTPATIENT
Start: 2022-12-16

## 2022-12-16 NOTE — PATIENT INSTRUCTIONS
WE HAVE STARTED YOU ON LOSARTAN-HYDROCHLOROTHIAZIDE WHICH IS A COMBINATION OF YOUR LOSARTAN AND THE WATER PILL - HCTZ    START TAKING 2 PILLS OF YOUR HYDROCHLOROTHIAZIDE TILL YOU RUN OUT AND THEN STOP TAKING THE LOSARTAN AND SWITCH TO THE COMBINATION PILL

## 2022-12-16 NOTE — PROGRESS NOTES
Assessment/Plan:     Essential hypertension   -Much improved but not optimally controlled  -We will increase his hydrochlorothiazide from 12 5 mg daily to 25 mg daily and continue with losartan and amlodipine  -Continue with a low-salt diet  -He was counseled to double up on his hydrochlorothiazide for now, until he runs out and then to switch from losartan and HCT to combination losartan-HCTZ    Uncontrolled hypertension  -Resolved, blood pressure is much improved  -We will resolve this diagnosis  -We will continue with medication as above    Hyperlipidemia  -Currently not on a statin  -Continue with heart healthy diet and with exercise  -We will repeat a lipid panel    Transaminitis  - Stable  -We will follow-up with the results of repeat CMP to monitor transaminitis    Anal tag  -Resolved status post surgery by colorectal  -Patient is doing somewhat better and we will continue to monitor him    Microscopic hematuria  -Patient had declined to have any intervention with urology prior to completion of his surgery for his anal skin tag  -We will order repeat urinalysis with microscopy now that he is surgery is done    Need for influenza vaccine  - Patient will receive a flu shot today    Need for annual physical exam  -we will order a CBC, CMP, TSH, lipid panel  -  follow-up in 3 months for an physical exam       Diagnoses and all orders for this visit:    Uncontrolled stage 2 hypertension  -     losartan-hydrochlorothiazide (HYZAAR) 100-25 MG per tablet; Take 1 tablet by mouth daily  -     TSH, 3rd generation with Free T4 reflex; Future  -     Comprehensive metabolic panel; Future    Other microscopic hematuria  -     Urinalysis with microscopic; Future    Transaminitis  -     Comprehensive metabolic panel; Future    Other hyperlipidemia  -     Lipid panel;  Future    Other elevated white blood cell (WBC) count  -     CBC and differential; Future    Skin tag of perianal region    Need for influenza vaccination  - influenza vaccine, quadrivalent, 0 5 mL, preservative-free, for adult and pediatric patients 6 mos+ (AFLURIA, FLUARIX, FLULAVAL, FLUZONE)    Annual physical exam  -     CBC and differential; Future  -     TSH, 3rd generation with Free T4 reflex; Future  -     Comprehensive metabolic panel; Future  -     Lipid panel; Future    Essential hypertension             Subjective:      Patient ID: Juan Belle is a 38 y o  male  HPI  Presents for a follow up visit regarding his essential hypertension, microscopic hematuria, transaminitis, prediabetes, hyperlipidemia  He states that he has been checking his bp at home and getting values around diastolics of 80- low 76W  Systolic [id number]  Sometimes eats salty snacks but does not add salt to his meals at the table and states that he does not junk food so much  Can eat salty meats sometimes  He does not smoke or drink coffee but sometimes drinks caffeinated soda  Had the sx for the rectal tag 2 months and now has improved symptoms - no more leakage of fluid but minimal bleeding which is improving  He was told to expect the bleeding by the surgeon  Has not been working too hard on a heart healthy diet at this time  Does not see blood in his urine  He denies fever, chills, night sweats, headache, dizziness, chest pain, shortness of breath, palpitations, nausea, vomiting, abdominal pain, diarrhea, constipation, myalgias, arthralgias  The following portions of the patient's history were reviewed and updated as appropriate:   He  has a past medical history of Hypertension (12/2021)    He   Patient Active Problem List    Diagnosis Date Noted   • Leucocytosis 09/28/2022   • Skin tag of perianal region 07/27/2022   • Other hyperlipidemia 04/22/2022   • Other microscopic hematuria 04/22/2022   • 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   • Other proteinuria 02/03/2022   • Transaminitis 02/03/2022   • Prediabetes 02/03/2022   • Elevated serum protein level 02/03/2022   • Annual physical exam 12/10/2021   • BMI 38 0-38 9,adult 12/10/2021   • Closed low lateral malleolus fracture, left, with routine healing, subsequent encounter 03/19/2021     He  has a past surgical history that includes No past surgeries; pr anrct xm surg req anes general spi/edrl dx (N/A, 10/12/2022); and pr surg tx anal fistula subq (N/A, 10/12/2022)  His family history includes Cancer in his father; Diabetes in his father; Hypertension in his father and mother  He  reports that he has never smoked  He has never used smokeless tobacco  He reports current alcohol use  He reports that he does not use drugs  Current Outpatient Medications   Medication Sig Dispense Refill   • amLODIPine (NORVASC) 10 mg tablet Take 1 tablet (10 mg total) by mouth daily 90 tablet 1   • losartan-hydrochlorothiazide (HYZAAR) 100-25 MG per tablet Take 1 tablet by mouth daily 90 tablet 1     No current facility-administered medications for this visit  Current Outpatient Medications on File Prior to Visit   Medication Sig   • amLODIPine (NORVASC) 10 mg tablet Take 1 tablet (10 mg total) by mouth daily   • [DISCONTINUED] hydrochlorothiazide (HYDRODIURIL) 12 5 mg tablet Take 1 tablet (12 5 mg total) by mouth daily   • [DISCONTINUED] losartan (Cozaar) 100 MG tablet Take 1 tablet (100 mg total) by mouth daily     No current facility-administered medications on file prior to visit  He has No Known Allergies       Review of Systems   Constitutional: Negative for activity change, chills, fatigue, fever and unexpected weight change  HENT: Negative for ear pain, postnasal drip, rhinorrhea, sinus pressure and sore throat  Eyes: Negative for pain  Respiratory: Negative for cough, choking, chest tightness, shortness of breath and wheezing  Cardiovascular: Negative for chest pain, palpitations and leg swelling  Gastrointestinal: Positive for anal bleeding (Occasional anal bleeding Since the surgery)   Negative for abdominal pain, constipation, diarrhea, nausea and vomiting  Genitourinary: Negative for dysuria and hematuria  Musculoskeletal: Negative for arthralgias, back pain, gait problem, joint swelling, myalgias and neck stiffness  Skin: Negative for pallor and rash  Neurological: Negative for dizziness, tremors, seizures, syncope, light-headedness and headaches  Hematological: Negative for adenopathy  Psychiatric/Behavioral: Positive for sleep disturbance (Occasionally snores)  Negative for behavioral problems  Objective:      /88 (BP Location: Left arm, Patient Position: Sitting, Cuff Size: Large)   Pulse 76   Temp 98 8 °F (37 1 °C) (Temporal)   Ht 5' 9" (1 753 m)   Wt 126 kg (276 lb 12 8 oz)   SpO2 97%   BMI 40 88 kg/m²          Physical Exam  Constitutional:       General: He is not in acute distress  Appearance: He is well-developed  He is obese  He is not diaphoretic  Comments: BMI is 40 88   HENT:      Head: Normocephalic and atraumatic  Right Ear: External ear normal       Left Ear: External ear normal       Nose: Nose normal       Mouth/Throat:      Mouth: Mucous membranes are dry  Pharynx: Posterior oropharyngeal erythema (Oropharyngeal erythema with dry mucous membranes and Mallampati class IV oropharynx) present  No oropharyngeal exudate  Eyes:      General: No scleral icterus  Right eye: No discharge  Left eye: No discharge  Conjunctiva/sclera: Conjunctivae normal       Pupils: Pupils are equal, round, and reactive to light  Neck:      Thyroid: No thyromegaly  Vascular: No JVD  Trachea: No tracheal deviation  Cardiovascular:      Rate and Rhythm: Normal rate and regular rhythm  Heart sounds: Normal heart sounds  No murmur heard  No friction rub  No gallop  Pulmonary:      Effort: Pulmonary effort is normal  No respiratory distress  Breath sounds: Normal breath sounds  No wheezing or rales  Chest:      Chest wall: No tenderness  Abdominal:      General: Bowel sounds are normal  There is no distension  Palpations: Abdomen is soft  There is no mass  Tenderness: There is no abdominal tenderness  There is no guarding or rebound  Musculoskeletal:         General: No tenderness or deformity  Normal range of motion  Cervical back: Normal range of motion and neck supple  Lymphadenopathy:      Cervical: No cervical adenopathy  Skin:     General: Skin is warm and dry  Coloration: Skin is not pale  Findings: No erythema or rash  Neurological:      Mental Status: He is alert and oriented to person, place, and time  Cranial Nerves: No cranial nerve deficit  Motor: No abnormal muscle tone  Coordination: Coordination normal       Deep Tendon Reflexes: Reflexes are normal and symmetric  Psychiatric:         Behavior: Behavior normal            Admission on 10/12/2022, Discharged on 10/12/2022   Component Date Value Ref Range Status   • Case Report 10/12/2022    Final                    Value:Surgical Pathology Report                         Case: [id number]                                   Authorizing Provider:  Reji Ortega MD   Collected:           10/12/2022 0936              Ordering Location:     [address] [place] [address]      Received:            10/12/2022 [address] [place] [address] Operating Room                                                      Pathologist:           Samantha Barry MD                                                             Specimen:    Skin, Cyst/Tag/Debridement, Perianal Skin Tag                                             • Final Diagnosis 10/12/2022    Final                    Value: This result contains rich text formatting which cannot be displayed here  • Note 10/12/2022    Final                    Value: This result contains rich text formatting which cannot be displayed here     • Additional Information 10/12/2022    Final Value:This result contains rich text formatting which cannot be displayed here  • Gross Description 10/12/2022    Final                    Value: This result contains rich text formatting which cannot be displayed here  • Clinical Information 10/12/2022    Final                    Value:perianal skin tag with purulent drainage in the right anterior lateral location  This proximally 5 cm from the anal verge  There was scarring near the anterior midline concerning for primary opening for fistula in ANO  Lab on 09/16/2022   Component Date Value Ref Range Status   • Total Bilirubin 09/16/2022 0 32  0 20 - 1 00 mg/dL Final    Use of this assay is not recommended for patients undergoing treatment with eltrombopag due to the potential for falsely elevated results  • Bilirubin, Direct 09/16/2022 0 06  0 00 - 0 20 mg/dL Final   • Alkaline Phosphatase 09/16/2022 73  46 - 116 U/L Final   • AST 09/16/2022 56 (H)  5 - 45 U/L Final    Slightly Hemolyzed; Results May be Affected  Specimen collection should occur prior to Sulfasalazine and/or Sulfapyridine administration due to the potential for falsely depressed results  • ALT 09/16/2022 108 (H)  12 - 78 U/L Final    Specimen collection should occur prior to Sulfasalazine and/or Sulfapyridine administration due to the potential for falsely depressed results  • Total Protein 09/16/2022 8 1  6 4 - 8 4 g/dL Final   • Albumin 09/16/2022 3 6  3 5 - 5 0 g/dL Final   • Sodium 09/16/2022 138  135 - 147 mmol/L Final   • Potassium 09/16/2022 4 1  3 5 - 5 3 mmol/L Final    Slightly Hemolyzed;  Results May be Affected   • Chloride 09/16/2022 108  96 - 108 mmol/L Final   • CO2 09/16/2022 23  21 - 32 mmol/L Final   • ANION GAP 09/16/2022 7  4 - 13 mmol/L Final   • BUN 09/16/2022 11  5 - 25 mg/dL Final   • Creatinine 09/16/2022 0 77  0 60 - 1 30 mg/dL Final    Standardized to IDMS reference method   • Glucose, Fasting 09/16/2022 97  65 - 99 mg/dL Final    Specimen collection should occur prior to Sulfasalazine administration due to the potential for falsely depressed results  Specimen collection should occur prior to Sulfapyridine administration due to the potential for falsely elevated results     • Calcium 09/16/2022 9 4  8 3 - 10 1 mg/dL Final   • eGFR 09/16/2022 111  ml/min/1 73sq m Final   • WBC 09/16/2022 12 10 (H)  4 31 - 10 16 Thousand/uL Final   • RBC 09/16/2022 4 60  3 88 - 5 62 Million/uL Final   • Hemoglobin 09/16/2022 14 1  12 0 - 17 0 g/dL Final   • Hematocrit 09/16/2022 44 4  36 5 - 49 3 % Final   • MCV 09/16/2022 97  82 - 98 fL Final   • MCH 09/16/2022 30 7  26 8 - 34 3 pg Final   • MCHC 09/16/2022 31 8  31 4 - 37 4 g/dL Final   • RDW 09/16/2022 13 2  11 6 - 15 1 % Final   • MPV 09/16/2022 10 5  8 9 - 12 7 fL Final   • Platelets 96/60/1105 341  149 - 390 Thousands/uL Final   • nRBC 09/16/2022 0  /100 WBCs Final   • Neutrophils Relative 09/16/2022 61  43 - 75 % Final   • Immat GRANS % 09/16/2022 2  0 - 2 % Final   • Lymphocytes Relative 09/16/2022 24  14 - 44 % Final   • Monocytes Relative 09/16/2022 9  4 - 12 % Final   • Eosinophils Relative 09/16/2022 3  0 - 6 % Final   • Basophils Relative 09/16/2022 1  0 - 1 % Final   • Neutrophils Absolute 09/16/2022 7 35  1 85 - 7 62 Thousands/µL Final   • Immature Grans Absolute 09/16/2022 0 28 (H)  0 00 - 0 20 Thousand/uL Final   • Lymphocytes Absolute 09/16/2022 2 94  0 60 - 4 47 Thousands/µL Final   • Monocytes Absolute 09/16/2022 1 05  0 17 - 1 22 Thousand/µL Final   • Eosinophils Absolute 09/16/2022 0 36  0 00 - 0 61 Thousand/µL Final   • Basophils Absolute 09/16/2022 0 12 (H)  0 00 - 0 10 Thousands/µL Final   Office Visit on 07/15/2022   Component Date Value Ref Range Status   • LEUKOCYTE ESTERASE,UA 07/15/2022 neg   Final   • NITRITE,UA 07/15/2022 neg   Final   • SL AMB POCT UROBILINOGEN 07/15/2022 0 2   Final   • POCT URINE PROTEIN 07/15/2022 neg   Final   •  PH,UA 07/15/2022 5 0   Final   • BLOOD,UA 07/15/2022 large   Final • SPECIFIC GRAVITY,UA 07/15/2022 1 015   Final   • KETONES,UA 07/15/2022 neg   Final   • BILIRUBIN,UA 07/15/2022 neg   Final   • GLUCOSE, UA 07/15/2022 neg   Final   •  COLOR,UA 07/15/2022 yellow   Final   • CLARITY,UA 07/15/2022 clear   Final   • Color, UA 07/15/2022 Light Yellow   Final   • Clarity, UA 07/15/2022 Clear   Final   • Specific Gravity, UA 07/15/2022 1 017  1 003 - 1 030 Final   • pH, UA 07/15/2022 6 0  4 5, 5 0, 5 5, 6 0, 6 5, 7 0, 7 5, 8 0 Final   • Leukocytes, UA 07/15/2022 Negative  Negative Final   • Nitrite, UA 07/15/2022 Negative  Negative Final   • Protein, UA 07/15/2022 Negative  Negative mg/dl Final   • Glucose, UA 07/15/2022 Negative  Negative mg/dl Final   • Ketones, UA 07/15/2022 Negative  Negative mg/dl Final   • Urobilinogen, UA 07/15/2022 <2 0  <2 0 mg/dl mg/dl Final   • Bilirubin, UA 07/15/2022 Negative  Negative Final   • Occult Blood, UA 07/15/2022 Small (A)  Negative Final   • RBC, UA 07/15/2022 1-2  None Seen, 1-2 /hpf Final   • WBC, UA 07/15/2022 2-4 (A)  None Seen, 1-2 /hpf Final   • Epithelial Cells 07/15/2022 Occasional  None Seen, Occasional /hpf Final   • Bacteria, UA 07/15/2022 None Seen  None Seen, Occasional /hpf Final   • MUCUS THREADS 07/15/2022 Moderate (A)  None Seen Final   • Urine Culture 07/15/2022 No Growth <1000 cfu/mL   Final   Appointment on 05/07/2022   Component Date Value Ref Range Status   • Color, UA 05/07/2022 Light Yellow   Final   • Clarity, UA 05/07/2022 Clear   Final   • Specific Gravity, UA 05/07/2022 1 013  1 003 - 1 030 Final   • pH, UA 05/07/2022 5 5  4 5, 5 0, 5 5, 6 0, 6 5, 7 0, 7 5, 8 0 Final   • Leukocytes, UA 05/07/2022 Negative  Negative Final   • Nitrite, UA 05/07/2022 Negative  Negative Final   • Protein, UA 05/07/2022 Negative  Negative mg/dl Final   • Glucose, UA 05/07/2022 Negative  Negative mg/dl Final   • Ketones, UA 05/07/2022 Negative  Negative mg/dl Final   • Urobilinogen, UA 05/07/2022 <2 0  <2 0 mg/dl mg/dl Final   • Bilirubin, UA 05/07/2022 Negative  Negative Final   • Occult Blood, UA 05/07/2022 Small (A)  Negative Final   • RBC, UA 05/07/2022 1-2  None Seen, 1-2 /hpf Final   • WBC, UA 05/07/2022 1-2  None Seen, 1-2 /hpf Final   • Epithelial Cells 05/07/2022 None Seen  None Seen, Occasional /hpf Final   • Bacteria, UA 05/07/2022 None Seen  None Seen, Occasional /hpf Final   • MUCUS THREADS 05/07/2022 Occasional (A)  None Seen Final   • Hyaline Casts, UA 05/07/2022 0-3 (A)  None Seen /lpf Final   Appointment on 04/22/2022   Component Date Value Ref Range Status   • Color, UA 04/22/2022 Light Orange   Final   • Clarity, UA 04/22/2022 Extra Turbid   Final   • Specific Gravity, UA 04/22/2022 1 021  1 003 - 1 030 Final   • pH, UA 04/22/2022 5 5  4 5, 5 0, 5 5, 6 0, 6 5, 7 0, 7 5, 8 0 Final   • Leukocytes, UA 04/22/2022 Negative  Negative Final   • Nitrite, UA 04/22/2022 Negative  Negative Final   • Protein, UA 04/22/2022 Trace (A)  Negative mg/dl Final   • Glucose, UA 04/22/2022 Negative  Negative mg/dl Final   • Ketones, UA 04/22/2022 Negative  Negative mg/dl Final   • Urobilinogen, UA 04/22/2022 <2 0  <2 0 mg/dl mg/dl Final   • Bilirubin, UA 04/22/2022 Negative  Negative Final   • Occult Blood, UA 04/22/2022 Small (A)  Negative Final   • RBC, UA 04/22/2022 1-2  None Seen, 1-2 /hpf Final   • WBC, UA 04/22/2022 1-2  None Seen, 1-2 /hpf Final   • Epithelial Cells 04/22/2022 Occasional  None Seen, Occasional /hpf Final   • Bacteria, UA 04/22/2022 Moderate (A)  None Seen, Occasional /hpf Final   • MUCUS THREADS 04/22/2022 Moderate (A)  None Seen Final   • Hepatitis B Surface Ag 04/22/2022 Non-reactive  Non-reactive, NonReactive - Confirmed Final   • Hepatitis C Ab 04/22/2022 Non-reactive  Non-reactive Final   • Hep B C IgM 04/22/2022 Non-reactive  Non-reactive Final   • Hep B Core Total Ab 04/22/2022 Non-reactive  Non-reactive Final   • Sodium 04/22/2022 141  136 - 145 mmol/L Final   • Potassium 04/22/2022 4 3  3 5 - 5 3 mmol/L Final   • Chloride 04/22/2022 109 (H)  100 - 108 mmol/L Final   • CO2 04/22/2022 28  21 - 32 mmol/L Final   • ANION GAP 04/22/2022 4  4 - 13 mmol/L Final   • BUN 04/22/2022 12  5 - 25 mg/dL Final   • Creatinine 04/22/2022 0 78  0 60 - 1 30 mg/dL Final    Standardized to IDMS reference method   • Glucose, Fasting 04/22/2022 112 (H)  65 - 99 mg/dL Final    Specimen collection should occur prior to Sulfasalazine administration due to the potential for falsely depressed results  Specimen collection should occur prior to Sulfapyridine administration due to the potential for falsely elevated results  • Calcium 04/22/2022 9 5  8 3 - 10 1 mg/dL Final   • AST 04/22/2022 53 (H)  5 - 45 U/L Final    Specimen collection should occur prior to Sulfasalazine administration due to the potential for falsely depressed results  • ALT 04/22/2022 122 (H)  12 - 78 U/L Final    Specimen collection should occur prior to Sulfasalazine and/or Sulfapyridine administration due to the potential for falsely depressed results  • Alkaline Phosphatase 04/22/2022 80  46 - 116 U/L Final   • Total Protein 04/22/2022 8 2  6 4 - 8 2 g/dL Final   • Albumin 04/22/2022 3 6  3 5 - 5 0 g/dL Final   • Total Bilirubin 04/22/2022 0 49  0 20 - 1 00 mg/dL Final    Use of this assay is not recommended for patients undergoing treatment with eltrombopag due to the potential for falsely elevated results     • eGFR 04/22/2022 110  ml/min/1 73sq m Final   Appointment on 01/17/2022   Component Date Value Ref Range Status   • WBC 01/17/2022 10 48 (H)  4 31 - 10 16 Thousand/uL Final   • RBC 01/17/2022 4 93  3 88 - 5 62 Million/uL Final   • Hemoglobin 01/17/2022 15 1  12 0 - 17 0 g/dL Final   • Hematocrit 01/17/2022 46 9  36 5 - 49 3 % Final   • MCV 01/17/2022 95  82 - 98 fL Final   • MCH 01/17/2022 30 6  26 8 - 34 3 pg Final   • MCHC 01/17/2022 32 2  31 4 - 37 4 g/dL Final   • RDW 01/17/2022 13 5  11 6 - 15 1 % Final   • MPV 01/17/2022 10 3  8 9 - 12 7 fL Final   • Platelets 01/17/2022 289  149 - 390 Thousands/uL Final   • nRBC 01/17/2022 0  /100 WBCs Final   • Neutrophils Relative 01/17/2022 71  43 - 75 % Final   • Immat GRANS % 01/17/2022 1  0 - 2 % Final   • Lymphocytes Relative 01/17/2022 17  14 - 44 % Final   • Monocytes Relative 01/17/2022 8  4 - 12 % Final   • Eosinophils Relative 01/17/2022 2  0 - 6 % Final   • Basophils Relative 01/17/2022 1  0 - 1 % Final   • Neutrophils Absolute 01/17/2022 7 37  1 85 - 7 62 Thousands/µL Final   • Immature Grans Absolute 01/17/2022 0 15  0 00 - 0 20 Thousand/uL Final   • Lymphocytes Absolute 01/17/2022 1 82  0 60 - 4 47 Thousands/µL Final   • Monocytes Absolute 01/17/2022 0 88  0 17 - 1 22 Thousand/µL Final   • Eosinophils Absolute 01/17/2022 0 19  0 00 - 0 61 Thousand/µL Final   • Basophils Absolute 01/17/2022 0 07  0 00 - 0 10 Thousands/µL Final   • TSH 3RD GENERATON 01/17/2022 2 850  0 358 - 3 740 uIU/mL Final   • Sodium 01/17/2022 138  136 - 145 mmol/L Final   • Potassium 01/17/2022 4 1  3 5 - 5 3 mmol/L Final   • Chloride 01/17/2022 108  100 - 108 mmol/L Final   • CO2 01/17/2022 26  21 - 32 mmol/L Final   • ANION GAP 01/17/2022 4  4 - 13 mmol/L Final   • BUN 01/17/2022 14  5 - 25 mg/dL Final   • Creatinine 01/17/2022 0 99  0 60 - 1 30 mg/dL Final    Standardized to IDMS reference method   • Glucose 01/17/2022 101  65 - 140 mg/dL Final    If the patient is fasting, the ADA then defines impaired fasting glucose as > 100 mg/dL and diabetes as > or equal to 123 mg/dL  Specimen collection should occur prior to Sulfasalazine administration due to the potential for falsely depressed results  Specimen collection should occur prior to Sulfapyridine administration due to the potential for falsely elevated results  • Calcium 01/17/2022 9 7  8 3 - 10 1 mg/dL Final   • AST 01/17/2022 50 (H)  5 - 45 U/L Final    Specimen collection should occur prior to Sulfasalazine administration due to the potential for falsely depressed results      • ALT 01/17/2022 115 (H)  12 - 78 U/L Final    Specimen collection should occur prior to Sulfasalazine and/or Sulfapyridine administration due to the potential for falsely depressed results  • Alkaline Phosphatase 01/17/2022 81  46 - 116 U/L Final   • Total Protein 01/17/2022 8 7 (H)  6 4 - 8 2 g/dL Final   • Albumin 01/17/2022 3 9  3 5 - 5 0 g/dL Final   • Total Bilirubin 01/17/2022 0 71  0 20 - 1 00 mg/dL Final    Use of this assay is not recommended for patients undergoing treatment with eltrombopag due to the potential for falsely elevated results  • eGFR 01/17/2022 92  ml/min/1 73sq m Final   • Cholesterol 01/17/2022 208 (H)  See Comment mg/dL Final    Cholesterol:         Pediatric <18 Years        Desirable          <170 mg/dL      Borderline High    170-199 mg/dL      High               >=200 mg/dL        Adult >=18 Years            Desirable         <200 mg/dL      Borderline High   200-239 mg/dL      High              >239 mg/dL     • Triglycerides 01/17/2022 93  See Comment mg/dL Final    Triglyceride:     0-9Y            <75mg/dL     10Y-17Y         <90 mg/dL       >=18Y     Normal          <150 mg/dL     Borderline High 150-199 mg/dL     High            200-499 mg/dL        Very High       >499 mg/dL    Specimen collection should occur prior to N-Acetylcysteine or Metamizole administration due to the potential for falsely depressed results  • HDL, Direct 01/17/2022 51  >=40 mg/dL Final    Specimen collection should occur prior to Metamizole administration due to the potential for falsley depressed results  • LDL Calculated 01/17/2022 138 (H)  0 - 100 mg/dL Final    LDL Cholesterol:     Optimal           <100 mg/dl     Near Optimal      100-129 mg/dl     Above Optimal       Borderline High 130-159 mg/dl       High            160-189 mg/dl       Very High       >189 mg/dl         This screening LDL is a calculated result     It does not have the accuracy of the Direct Measured LDL in the monitoring of patients with hyperlipidemia and/or statin therapy  Direct Measure LDL ([id number]) must be ordered separately in these patients  • Non-HDL-Chol (CHOL-HDL) 01/17/2022 157  mg/dl Final   • Hemoglobin A1C 01/17/2022 5 9 (H)  Normal 3 8-5 6%; PreDiabetic 5 7-6 4%;  Diabetic >=6 5%; Glycemic control for adults with diabetes <7 0% % Final   • EAG 01/17/2022 123  mg/dl Final   • Clarity, UA 01/17/2022 Clear   Final   • Color, UA 01/17/2022 Yellow   Final   • Specific Gravity, UA 01/17/2022 >=1 030  1 003 - 1 030 Final   • pH, UA 01/17/2022 5 5  4 5, 5 0, 5 5, 6 0, 6 5, 7 0, 7 5, 8 0 Final   • Glucose, UA 01/17/2022 Negative  Negative mg/dl Final   • Ketones, UA 01/17/2022 Negative  Negative mg/dl Final   • Occult Blood, UA 01/17/2022 Small   Final   • Protein, UA 01/17/2022 30 (1+) (A)  Negative mg/dl Final   • Nitrite, UA 01/17/2022 Negative  Negative Final   • Bilirubin, UA 01/17/2022 Negative  Negative Final   • Urobilinogen, UA 01/17/2022 0 2  0 2, 1 0 E U /dl E U /dl Final   • Leukocytes, UA 01/17/2022 Negative  Negative Final   • WBC, UA 01/17/2022 None Seen  None Seen, 2-4, 5-60 /hpf Final   • RBC, UA 01/17/2022 None Seen  None Seen, 2-4 /hpf Final   • Hyaline Casts, UA 01/17/2022 5-10 (A)  None Seen /lpf Final   • Bacteria, UA 01/17/2022 None Seen  None Seen, Occasional /hpf Final   • Fine granular casts 01/17/2022 3-5  /lpf Final   • Ca Oxalate Debbi, UA 01/17/2022 Occasional (A)  None Seen /hpf Final   • Epithelial Cells 01/17/2022 None Seen  None Seen, Occasional /hpf Final   • MUCUS THREADS 01/17/2022 Moderate (A)  None Seen Final   • RBC Casts, UA 01/17/2022 3-5 (A)  (none) /lpf Final

## 2023-01-05 DIAGNOSIS — I10 UNCONTROLLED STAGE 2 HYPERTENSION: ICD-10-CM

## 2023-01-06 RX ORDER — LOSARTAN POTASSIUM AND HYDROCHLOROTHIAZIDE 25; 100 MG/1; MG/1
1 TABLET ORAL DAILY
Qty: 90 TABLET | Refills: 0 | Status: SHIPPED | OUTPATIENT
Start: 2023-01-06 | End: 2023-01-09 | Stop reason: SDUPTHER

## 2023-01-06 NOTE — TELEPHONE ENCOUNTER
Medication Refill Request     Name losartan-hydrochlorotiazide   Dose/Frequency 100-25 mg per tab/ 1 tab daily  Quantity 90   Verified pharmacy   [x]  Verified ordering Provider   [x]  Does patient have enough for the next 3 days?  Yes [] No [x]

## 2023-01-09 DIAGNOSIS — I10 UNCONTROLLED STAGE 2 HYPERTENSION: ICD-10-CM

## 2023-01-09 RX ORDER — LOSARTAN POTASSIUM AND HYDROCHLOROTHIAZIDE 25; 100 MG/1; MG/1
1 TABLET ORAL DAILY
Qty: 90 TABLET | Refills: 0 | Status: SHIPPED | OUTPATIENT
Start: 2023-01-09

## 2023-02-17 DIAGNOSIS — I10 UNCONTROLLED STAGE 2 HYPERTENSION: ICD-10-CM

## 2023-02-17 RX ORDER — AMLODIPINE BESYLATE 10 MG/1
10 TABLET ORAL DAILY
Qty: 90 TABLET | Refills: 1 | Status: SHIPPED | OUTPATIENT
Start: 2023-02-17

## 2023-03-10 ENCOUNTER — RA CDI HCC (OUTPATIENT)
Dept: OTHER | Facility: HOSPITAL | Age: 43
End: 2023-03-10

## 2023-03-10 ENCOUNTER — APPOINTMENT (OUTPATIENT)
Dept: LAB | Age: 43
End: 2023-03-10

## 2023-03-10 DIAGNOSIS — E78.49 OTHER HYPERLIPIDEMIA: ICD-10-CM

## 2023-03-10 DIAGNOSIS — I10 UNCONTROLLED STAGE 2 HYPERTENSION: ICD-10-CM

## 2023-03-10 DIAGNOSIS — D72.828 OTHER ELEVATED WHITE BLOOD CELL (WBC) COUNT: ICD-10-CM

## 2023-03-10 DIAGNOSIS — Z00.00 ANNUAL PHYSICAL EXAM: ICD-10-CM

## 2023-03-10 DIAGNOSIS — R31.29 OTHER MICROSCOPIC HEMATURIA: ICD-10-CM

## 2023-03-10 DIAGNOSIS — R74.01 TRANSAMINITIS: ICD-10-CM

## 2023-03-10 LAB
ALBUMIN SERPL BCP-MCNC: 3.7 G/DL (ref 3.5–5)
ALP SERPL-CCNC: 70 U/L (ref 46–116)
ALT SERPL W P-5'-P-CCNC: 85 U/L (ref 12–78)
ANION GAP SERPL CALCULATED.3IONS-SCNC: 9 MMOL/L (ref 4–13)
AST SERPL W P-5'-P-CCNC: 41 U/L (ref 5–45)
BACTERIA UR QL AUTO: ABNORMAL /HPF
BASOPHILS # BLD AUTO: 0.08 THOUSANDS/ÂΜL (ref 0–0.1)
BASOPHILS NFR BLD AUTO: 1 % (ref 0–1)
BILIRUB SERPL-MCNC: 0.63 MG/DL (ref 0.2–1)
BILIRUB UR QL STRIP: NEGATIVE
BUN SERPL-MCNC: 15 MG/DL (ref 5–25)
CALCIUM SERPL-MCNC: 9.4 MG/DL (ref 8.3–10.1)
CHLORIDE SERPL-SCNC: 104 MMOL/L (ref 96–108)
CHOLEST SERPL-MCNC: 179 MG/DL
CLARITY UR: CLEAR
CO2 SERPL-SCNC: 25 MMOL/L (ref 21–32)
COLOR UR: ABNORMAL
CREAT SERPL-MCNC: 0.72 MG/DL (ref 0.6–1.3)
EOSINOPHIL # BLD AUTO: 0.24 THOUSAND/ÂΜL (ref 0–0.61)
EOSINOPHIL NFR BLD AUTO: 2 % (ref 0–6)
ERYTHROCYTE [DISTWIDTH] IN BLOOD BY AUTOMATED COUNT: 13.4 % (ref 11.6–15.1)
GFR SERPL CREATININE-BSD FRML MDRD: 113 ML/MIN/1.73SQ M
GLUCOSE P FAST SERPL-MCNC: 131 MG/DL (ref 65–99)
GLUCOSE UR STRIP-MCNC: NEGATIVE MG/DL
HCT VFR BLD AUTO: 42.5 % (ref 36.5–49.3)
HDLC SERPL-MCNC: 45 MG/DL
HGB BLD-MCNC: 14.3 G/DL (ref 12–17)
HGB UR QL STRIP.AUTO: ABNORMAL
IMM GRANULOCYTES # BLD AUTO: 0.17 THOUSAND/UL (ref 0–0.2)
IMM GRANULOCYTES NFR BLD AUTO: 2 % (ref 0–2)
KETONES UR STRIP-MCNC: NEGATIVE MG/DL
LDLC SERPL CALC-MCNC: 114 MG/DL (ref 0–100)
LEUKOCYTE ESTERASE UR QL STRIP: NEGATIVE
LYMPHOCYTES # BLD AUTO: 2.33 THOUSANDS/ÂΜL (ref 0.6–4.47)
LYMPHOCYTES NFR BLD AUTO: 22 % (ref 14–44)
MCH RBC QN AUTO: 31.2 PG (ref 26.8–34.3)
MCHC RBC AUTO-ENTMCNC: 33.6 G/DL (ref 31.4–37.4)
MCV RBC AUTO: 93 FL (ref 82–98)
MONOCYTES # BLD AUTO: 0.89 THOUSAND/ÂΜL (ref 0.17–1.22)
MONOCYTES NFR BLD AUTO: 9 % (ref 4–12)
MUCOUS THREADS UR QL AUTO: ABNORMAL
NEUTROPHILS # BLD AUTO: 6.79 THOUSANDS/ÂΜL (ref 1.85–7.62)
NEUTS SEG NFR BLD AUTO: 64 % (ref 43–75)
NITRITE UR QL STRIP: NEGATIVE
NON-SQ EPI CELLS URNS QL MICRO: ABNORMAL /HPF
NONHDLC SERPL-MCNC: 134 MG/DL
NRBC BLD AUTO-RTO: 0 /100 WBCS
PH UR STRIP.AUTO: 6.5 [PH]
PLATELET # BLD AUTO: 316 THOUSANDS/UL (ref 149–390)
PMV BLD AUTO: 10.2 FL (ref 8.9–12.7)
POTASSIUM SERPL-SCNC: 3.9 MMOL/L (ref 3.5–5.3)
PROT SERPL-MCNC: 7.7 G/DL (ref 6.4–8.4)
PROT UR STRIP-MCNC: ABNORMAL MG/DL
RBC # BLD AUTO: 4.59 MILLION/UL (ref 3.88–5.62)
RBC #/AREA URNS AUTO: ABNORMAL /HPF
SODIUM SERPL-SCNC: 138 MMOL/L (ref 135–147)
SP GR UR STRIP.AUTO: 1.02 (ref 1–1.03)
TRIGL SERPL-MCNC: 101 MG/DL
TSH SERPL DL<=0.05 MIU/L-ACNC: 2.17 UIU/ML (ref 0.45–4.5)
UROBILINOGEN UR STRIP-ACNC: <2 MG/DL
WBC # BLD AUTO: 10.5 THOUSAND/UL (ref 4.31–10.16)
WBC #/AREA URNS AUTO: ABNORMAL /HPF

## 2023-03-10 NOTE — PROGRESS NOTES
[facility]  coding opportunities       Chart reviewed, no opportunity found: CHART REVIEWED, NO OPPORTUNITY FOUND   This is a reminder to address ALL HCC (risk adjustment) codes as found on active problem list for 2023 as patient scores reset to zero PILY         Patients Insurance        Commercial Insurance: [address] [place] [address]

## 2023-03-17 ENCOUNTER — OFFICE VISIT (OUTPATIENT)
Dept: INTERNAL MEDICINE CLINIC | Age: 43
End: 2023-03-17

## 2023-03-17 VITALS
SYSTOLIC BLOOD PRESSURE: 124 MMHG | OXYGEN SATURATION: 96 % | HEIGHT: 69 IN | DIASTOLIC BLOOD PRESSURE: 66 MMHG | BODY MASS INDEX: 41.47 KG/M2 | TEMPERATURE: 98.5 F | HEART RATE: 76 BPM | WEIGHT: 280 LBS

## 2023-03-17 DIAGNOSIS — E78.49 OTHER HYPERLIPIDEMIA: ICD-10-CM

## 2023-03-17 DIAGNOSIS — Z00.00 ANNUAL PHYSICAL EXAM: Primary | ICD-10-CM

## 2023-03-17 DIAGNOSIS — R73.9 HYPERGLYCEMIA: ICD-10-CM

## 2023-03-17 NOTE — PATIENT INSTRUCTIONS
Wellness Visit for Adults   AMBULATORY CARE:   A wellness visit  is when you see your healthcare provider to get screened for health problems  Your healthcare provider will also give you advice on how to stay healthy  Write down your questions so you remember to ask them  Ask your healthcare provider how often you should have a wellness visit  What happens at a wellness visit:  Your healthcare provider will ask about your health, and your family history of health problems  This includes high blood pressure, heart disease, and cancer  He or she will ask if you have symptoms that concern you, if you smoke, and about your mood  You may also be asked about your intake of medicines, supplements, food, and alcohol  Any of the following may be done: Your weight  will be checked  Your height may also be checked so your body mass index (BMI) can be calculated  Your BMI shows if you are at a healthy weight  Your blood pressure  and heart rate will be checked  Your temperature may also be checked  Blood and urine tests  may be done  Blood tests may be done to check your cholesterol levels  Abnormal cholesterol levels increase your risk for heart disease and stroke  You may also need a blood or urine test to check for diabetes if you are at increased risk  Urine tests may be done to look for signs of an infection or kidney disease  A physical exam  includes checking your heartbeat and lungs with a stethoscope  Your healthcare provider may also check your skin to look for sun damage  Screening tests  may be recommended  A screening test is done to check for diseases that may not cause symptoms  The screening tests you may need depend on your age, gender, family history, and lifestyle habits  For example, colorectal screening may be recommended if you are 48years old or older  Screening tests you need if you are a woman:   A Pap smear  is used to screen for cervical cancer   Pap smears are usually done every 3 to 5 years depending on your age  You may need them more often if you have had abnormal Pap smear test results in the past  Ask your healthcare provider how often you should have a Pap smear  A mammogram  is an x-ray of your breasts to screen for breast cancer  Experts recommend mammograms every 2 years starting at age 48 years  You may need a mammogram at age 52 years or younger if you have an increased risk for breast cancer  Talk to your healthcare provider about when you should start having mammograms and how often you need them  Vaccines you may need:   Get an influenza vaccine  every year  The influenza vaccine protects you from the flu  Several types of viruses cause the flu  The viruses change over time, so new vaccines are made each year  Get a tetanus-diphtheria (Td) booster vaccine  every 10 years  This vaccine protects you against tetanus and diphtheria  Tetanus is a severe infection that may cause painful muscle spasms and lockjaw  Diphtheria is a severe bacterial infection that causes a thick covering in the back of your mouth and throat  Get a human papillomavirus (HPV) vaccine  if you are female and aged 23 to 32 or male 23 to 24 and never received it  This vaccine protects you from HPV infection  HPV is the most common infection spread by sexual contact  HPV may also cause vaginal, penile, and anal cancers  Get a pneumococcal vaccine  if you are aged 72 years or older  The pneumococcal vaccine is an injection given to protect you from pneumococcal disease  Pneumococcal disease is an infection caused by pneumococcal bacteria  The infection may cause pneumonia, meningitis, or an ear infection  Get a shingles vaccine  if you are 60 or older, even if you have had shingles before  The shingles vaccine is an injection to protect you from the varicella-zoster virus  This is the same virus that causes chickenpox   Shingles is a painful rash that develops in people who had chickenpox or have been exposed to the virus  How to eat healthy:  My Plate is a model for planning healthy meals  It shows the types and amounts of foods that should go on your plate  Fruits and vegetables make up about half of your plate, and grains and protein make up the other half  A serving of dairy is included on the side of your plate  The amount of calories and serving sizes you need depends on your age, gender, weight, and height  Examples of healthy foods are listed below:  Eat a variety of vegetables  such as dark green, red, and orange vegetables  You can also include canned vegetables low in sodium (salt) and frozen vegetables without added butter or sauces  Eat a variety of fresh fruits , canned fruit in 100% juice, frozen fruit, and dried fruit  Include whole grains  At least half of the grains you eat should be whole grains  Examples include whole-wheat bread, wheat pasta, brown rice, and whole-grain cereals such as oatmeal     Eat a variety of protein foods such as seafood (fish and shellfish), lean meat, and poultry without skin (turkey and chicken)  Examples of lean meats include pork leg, shoulder, or tenderloin, and beef round, sirloin, tenderloin, and extra lean ground beef  Other protein foods include eggs and egg substitutes, beans, peas, soy products, nuts, and seeds  Choose low-fat dairy products such as skim or 1% milk or low-fat yogurt, cheese, and cottage cheese  Limit unhealthy fats  such as butter, hard margarine, and shortening  Exercise:  Exercise at least 30 minutes per day on most days of the week  Some examples of exercise include walking, biking, dancing, and swimming  You can also fit in more physical activity by taking the stairs instead of the elevator or parking farther away from stores  Include muscle strengthening activities 2 days each week  Regular exercise provides many health benefits   It helps you manage your weight, and decreases your risk for type 2 diabetes, heart disease, stroke, and high blood pressure  Exercise can also help improve your mood  Ask your healthcare provider about the best exercise plan for you  General health and safety guidelines:   Do not smoke  Nicotine and other chemicals in cigarettes and cigars can cause lung damage  Ask your healthcare provider for information if you currently smoke and need help to quit  E-cigarettes or smokeless tobacco still contain nicotine  Talk to your healthcare provider before you use these products  Limit alcohol  A drink of alcohol is 12 ounces of beer, 5 ounces of wine, or 1½ ounces of liquor  Lose weight, if needed  Being overweight increases your risk of certain health conditions  These include heart disease, high blood pressure, type 2 diabetes, and certain types of cancer  Protect your skin  Do not sunbathe or use tanning beds  Use sunscreen with a SPF 15 or higher  Apply sunscreen at least 15 minutes before you go outside  Reapply sunscreen every 2 hours  Wear protective clothing, hats, and sunglasses when you are outside  Drive safely  Always wear your seatbelt  Make sure everyone in your car wears a seatbelt  A seatbelt can save your life if you are in an accident  Do not use your cell phone when you are driving  This could distract you and cause an accident  Pull over if you need to make a call or send a text message  Practice safe sex  Use latex condoms if are sexually active and have more than one partner  Your healthcare provider may recommend screening tests for sexually transmitted infections (STIs)  Wear helmets, lifejackets, and protective gear  Always wear a helmet when you ride a bike or motorcycle, go skiing, or play sports that could cause a head injury  Wear protective equipment when you play sports  Wear a lifejacket when you are on a boat or doing water sports      © Copyright Loral[employer] 2022 Information is for End User's use only and may not be sold, redistributed or otherwise used for commercial purposes  The above information is an  only  It is not intended as medical advice for individual conditions or treatments  Talk to your doctor, nurse or pharmacist before following any medical regimen to see if it is safe and effective for you

## 2023-03-25 ENCOUNTER — APPOINTMENT (OUTPATIENT)
Dept: LAB | Facility: MEDICAL CENTER | Age: 43
End: 2023-03-25

## 2023-03-25 DIAGNOSIS — R73.9 HYPERGLYCEMIA: ICD-10-CM

## 2023-03-25 LAB
EST. AVERAGE GLUCOSE BLD GHB EST-MCNC: 140 MG/DL
HBA1C MFR BLD: 6.5 %

## 2023-06-23 ENCOUNTER — RA CDI HCC (OUTPATIENT)
Dept: OTHER | Facility: HOSPITAL | Age: 43
End: 2023-06-23

## 2023-06-23 NOTE — PROGRESS NOTES
[facility] 75  coding opportunities          Chart Reviewed number of suggestions sent to Provider: 1   [id number] [phone]    Patients Insurance        Commercial Insurance: [employer]

## 2023-06-30 ENCOUNTER — APPOINTMENT (OUTPATIENT)
Dept: LAB | Age: 43
End: 2023-06-30
Payer: COMMERCIAL

## 2023-06-30 ENCOUNTER — OFFICE VISIT (OUTPATIENT)
Dept: INTERNAL MEDICINE CLINIC | Age: 43
End: 2023-06-30
Payer: COMMERCIAL

## 2023-06-30 VITALS
DIASTOLIC BLOOD PRESSURE: 72 MMHG | HEIGHT: 69 IN | WEIGHT: 276 LBS | SYSTOLIC BLOOD PRESSURE: 122 MMHG | BODY MASS INDEX: 40.88 KG/M2 | OXYGEN SATURATION: 98 % | HEART RATE: 78 BPM | TEMPERATURE: 98.4 F

## 2023-06-30 DIAGNOSIS — I10 ESSENTIAL HYPERTENSION: ICD-10-CM

## 2023-06-30 DIAGNOSIS — R73.9 HYPERGLYCEMIA: ICD-10-CM

## 2023-06-30 DIAGNOSIS — R73.03 PREDIABETES: ICD-10-CM

## 2023-06-30 DIAGNOSIS — E11.9 DIABETES MELLITUS TYPE 2, DIET-CONTROLLED (HCC): ICD-10-CM

## 2023-06-30 DIAGNOSIS — R74.01 TRANSAMINITIS: ICD-10-CM

## 2023-06-30 DIAGNOSIS — E78.49 OTHER HYPERLIPIDEMIA: Primary | ICD-10-CM

## 2023-06-30 LAB
ALBUMIN SERPL BCP-MCNC: 3.8 G/DL (ref 3.5–5)
ALP SERPL-CCNC: 79 U/L (ref 46–116)
ALT SERPL W P-5'-P-CCNC: 127 U/L (ref 12–78)
ANION GAP SERPL CALCULATED.3IONS-SCNC: 4 MMOL/L
AST SERPL W P-5'-P-CCNC: 77 U/L (ref 5–45)
BILIRUB SERPL-MCNC: 0.68 MG/DL (ref 0.2–1)
BUN SERPL-MCNC: 14 MG/DL (ref 5–25)
CALCIUM SERPL-MCNC: 9.3 MG/DL (ref 8.3–10.1)
CHLORIDE SERPL-SCNC: 108 MMOL/L (ref 96–108)
CO2 SERPL-SCNC: 26 MMOL/L (ref 21–32)
CREAT SERPL-MCNC: 0.83 MG/DL (ref 0.6–1.3)
EST. AVERAGE GLUCOSE BLD GHB EST-MCNC: 151 MG/DL
GFR SERPL CREATININE-BSD FRML MDRD: 107 ML/MIN/1.73SQ M
GLUCOSE P FAST SERPL-MCNC: 159 MG/DL (ref 65–99)
HBA1C MFR BLD: 6.9 %
POTASSIUM SERPL-SCNC: 4 MMOL/L (ref 3.5–5.3)
PROT SERPL-MCNC: 8.2 G/DL (ref 6.4–8.4)
SODIUM SERPL-SCNC: 138 MMOL/L (ref 135–147)

## 2023-06-30 PROCEDURE — 36415 COLL VENOUS BLD VENIPUNCTURE: CPT

## 2023-06-30 PROCEDURE — 83036 HEMOGLOBIN GLYCOSYLATED A1C: CPT

## 2023-06-30 PROCEDURE — 80053 COMPREHEN METABOLIC PANEL: CPT

## 2023-06-30 NOTE — PATIENT INSTRUCTIONS
Basic Carbohydrate Counting   AMBULATORY CARE:   Carbohydrate counting  is a way to plan your meals by counting the amount of carbohydrate in foods  Carbohydrates are the sugars, starches, and fiber found in fruit, grains, vegetables, and milk products  Carbohydrates increase your blood sugar levels  Carbohydrate counting can help you eat the right amount of carbohydrate to keep your blood sugar levels under control  What you need to know about planning meals using carbohydrate counting:  A dietitian or healthcare provider will help you develop a healthy meal plan that works best for you  You will be taught how much carbohydrate to eat or drink for each meal and snack  Your meal plan will be based on your age, weight, usual food intake, and physical activity level  If you have diabetes, it will also include your blood sugar levels and diabetes medicine  Once you know how much carbohydrate you should eat, you can decide what type of food you want to eat  You will need to know what foods contain carbohydrate and how much they contain  Keep track of the amount of carbohydrate in meals and snacks in order to follow your meal plan  Do not avoid carbohydrates or skip meals  Your blood sugar may fall too low if you do not eat enough carbohydrate or you skip meals  Foods that contain carbohydrate:   Breads:  Each serving of food listed below contains about 15 g of carbohydrate   1 slice of bread (1 ounce) or 1 flour or corn tortilla (6 inch)    ½ of a hamburger bun or ¼ of a large bagel (about 1 ounce)    1 pancake (about 4 inches across and ¼ inch thick)    Cereals and grains:  Serving sizes of ready-to-eat cereals vary  Look at the serving size and the total carbohydrate amount listed on the food label  Each serving of food listed below contains about 15 g of carbohydrate       ¾ cup of dry, unsweetened, ready-to-eat cereal or ¼ cup of low-fat granola     ½ cup of oatmeal or other cooked cereal     ? cup of cooked rice or pasta    Starchy vegetables and beans:  Each serving of food listed below contains about 15 g of carbohydrate   ½ cup of corn, green peas, sweet potatoes, or mashed potatoes    ¼ of a large baked potato    ½ cup of beans, lentils, and peas (garbanzo, mendoza, kidney, white, split, black-eyed)    Crackers and snacks:  Each serving of food listed below contains about 15 g of carbohydrate   3 nic cracker squares or 8 animal crackers     6 saltine-type crackers    3 cups of popcorn or ¾ ounce of pretzels, potato chips, or tortilla chips    Fruit:  Each serving of food listed below contains about 15 g of carbohydrate   1 small (4 ounce) piece of fresh fruit or ¾ to 1 cup of fresh fruit    ½ cup of canned or frozen fruit, packed in natural juice    ½ cup (4 ounces) of unsweetened fruit juice    2 tablespoons of dried fruit    Desserts or sugary foods:  Each serving of food listed below contains about 15 g of carbohydrate   2-inch square unfrosted cake or brownie     2 small cookies    ½ cup of ice cream, frozen yogurt, or nondairy frozen yogurt    ¼ cup of sherbet or sorbet    1 tablespoon of regular syrup, jam, or jelly    2 tablespoons of light syrup    Milk and yogurt:  Foods from the milk group contain about 12 g of carbohydrate per serving  1 cup of fat-free or low-fat milk    1 cup of soy milk    ? cup of fat-free, yogurt sweetened with artificial sweetener    Non-starchy vegetables:  Each serving contains about 5 g of carbohydrate   Three servings of non-starch vegetables count as 1 carbohydrate serving  ½ cup of cooked vegetables or 1 cup of raw vegetables  This includes beets, broccoli, cabbage, cauliflower, cucumber, mushrooms, tomatoes, and zucchini    ½ cup of vegetable juice    How to use carbohydrate counting to plan meals:   Count carbohydrate amounts using serving sizes:      Pasta dinner example: You plan to have pasta, tossed salad, and an 8-ounce glass of milk   Your healthcare provider tells you that you may have 4 carbohydrate servings for dinner  One carbohydrate serving of pasta is ? cup  One cup of pasta will equal 3 carbohydrate servings  An 8-ounce glass of milk will count as 1 carbohydrate serving  These amounts of food would equal 4 carbohydrate servings  One cup of tossed salad does not count toward your carbohydrate servings  Count carbohydrate amounts using food labels:  Find the total amount of carbohydrate in a packaged food by reading the food label  Food labels tell you the serving size of the food and the total carbohydrate amount in each serving  Find the serving size on the food label and then decide how many servings you will eat  Multiply the number of servings you plan to eat by the carbohydrate amount per serving  Granola bar snack example: Your meal plan allows you to have 2 carbohydrate servings (30 grams) of carbohydrate for a snack  You plan to eat 1 package of granola bars, which contains 2 bars  According to the food label, the serving size of food in this package is 1 bar  Each serving (1 bar) contains 25 grams of carbohydrate  The total amount of carbohydrate in this package of granola bars would be 50 g  Based on your meal plan, you should eat only 1 bar  Follow up with your doctor as directed:  Write down your questions so you remember to ask them during your visits  © Copyright Ife[employer]i [employer] 2022 Information is for End User's use only and may not be sold, redistributed or otherwise used for commercial purposes  The above information is an  only  It is not intended as medical advice for individual conditions or treatments  Talk to your doctor, nurse or pharmacist before following any medical regimen to see if it is safe and effective for you

## 2023-06-30 NOTE — PROGRESS NOTES
Assessment/Plan:    Prediabetes with hyperglycemia  -Improving fasting blood glucose with worsening hemoglobin A1c  -Hemoglobin A1c done on 3/25/2023 increased and met diabetic range at 6 5 but fasting blood glucose was much improved at 118  -We will order repeat CMP and hemoglobin A1c  -Patient was counseled to continue  to cut down on carbs and sweets and continue with exercise    Transaminitis  -Transaminitis worsened a little bit at his last labs 3 months ago, with increase in AST from 41-50 and increase in ALT from 85-93  -We will order repeat CMP  -Continue with weight loss as I believe that transaminitis may be secondary to fatty liver  Of note, chronic hepatitis panel on 4/22/2022 was negative  Hyperlipidemia  - improving  -Continue with a heart healthy diet and with exercise    Essential hypertension  -very well controlled  -Continue with losartan-hydrochlorothiazide and amlodipine as well as a low-salt diet and exercise       Diagnoses and all orders for this visit:    Other hyperlipidemia    Essential hypertension  -     Comprehensive metabolic panel; Future    Prediabetes  -     Hemoglobin A1C; Future  -     Comprehensive metabolic panel; Future    Hyperglycemia  -     Comprehensive metabolic panel; Future    Transaminitis  -     Comprehensive metabolic panel; Future                Subjective:      Patient ID: Anup Hopson is a 38 y o  male  HPI    Patient presents for a follow-up visit regarding his essential hypertension, prediabetes with hyperglycemia, hyperlipidemia, and transaminitis  He states that he  has been taking his medications as prescribed  Today pt states that he occasionally checks his bp at home   The numbers are better than they used to be when he does check them    Watching the salt and trying not to add salt in his diet   occasonally eats some salty food like beef jerky  He admits to occasional polyphagia but denies polyuria, polydipsia, visual disturbance, fatigue(except at the end of the day), fever, chills, night sweats, headache, dizziness, chest pain, shortness of breath, palpitations, nausea, vomiting, abdominal pain, diarrhea, constipation, myalgias, arthralgias  The following portions of the patient's history were reviewed and updated as appropriate:   He  has a past medical history of Hypertension (12/2021)  He   Patient Active Problem List    Diagnosis Date Noted   • Hyperglycemia 03/17/2023   • BMI 40 0-44 9, adult ([facility] [address] ) 03/17/2023   • Leucocytosis 09/28/2022   • Skin tag of perianal region 07/27/2022   • Other hyperlipidemia 04/22/2022   • Other microscopic hematuria 04/22/2022   • 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   • Other proteinuria 02/03/2022   • Transaminitis 02/03/2022   • Prediabetes 02/03/2022   • Elevated serum protein level 02/03/2022   • Annual physical exam 12/10/2021   • Closed low lateral malleolus fracture, left, with routine healing, subsequent encounter 03/19/2021     He  has a past surgical history that includes No past surgeries; pr anrct xm surg req anes general spi/edrl dx (N/A, 10/12/2022); and pr surg tx anal fistula subq (N/A, 10/12/2022)  His family history includes Cancer in his father; Diabetes in his father; Hypertension in his father and mother  He  reports that he has never smoked  He has never used smokeless tobacco  He reports current alcohol use  He reports that he does not use drugs  Current Outpatient Medications   Medication Sig Dispense Refill   • amLODIPine (NORVASC) 10 mg tablet Take 1 tablet (10 mg total) by mouth daily 90 tablet 1   • losartan-hydrochlorothiazide (HYZAAR) 100-25 MG per tablet Take 1 tablet by mouth daily 90 tablet 0     No current facility-administered medications for this visit       Current Outpatient Medications on File Prior to Visit   Medication Sig   • amLODIPine (NORVASC) 10 mg tablet Take 1 tablet (10 mg total) by mouth daily   • losartan-hydrochlorothiazide (HYZAAR) 100-25 MG per tablet Take 1 "tablet by mouth daily     No current facility-administered medications on file prior to visit  He has No Known Allergies       Review of Systems   Constitutional: Negative for activity change, chills, fatigue, fever and unexpected weight change  HENT: Negative for ear pain, postnasal drip, rhinorrhea, sinus pressure and sore throat  Eyes: Negative for pain  Respiratory: Negative for cough, choking, chest tightness, shortness of breath and wheezing  Cardiovascular: Negative for chest pain, palpitations and leg swelling  Gastrointestinal: Negative for abdominal pain, constipation, diarrhea, nausea and vomiting  Endocrine: Positive for polyphagia  Genitourinary: Negative for dysuria and hematuria  Musculoskeletal: Negative for arthralgias, back pain, gait problem, joint swelling, myalgias and neck stiffness  Skin: Negative for pallor and rash  Neurological: Negative for dizziness, tremors, seizures, syncope, light-headedness and headaches  Hematological: Negative for adenopathy  Psychiatric/Behavioral: Negative for behavioral problems  Objective:      /72 (BP Location: Left arm, Patient Position: Sitting, Cuff Size: Large)   Pulse 78   Temp 98 4 °F (36 9 °C) (Temporal)   Ht 5' 9\" (1 753 m)   Wt 125 kg (276 lb)   SpO2 98%   BMI 40 76 kg/m²          Physical Exam  Constitutional:       General: He is not in acute distress  Appearance: He is well-developed  He is obese  He is not diaphoretic  Comments: BMI is 40 7     HENT:      Head: Normocephalic and atraumatic  Right Ear: External ear normal       Left Ear: External ear normal       Nose: Nose normal       Mouth/Throat:      Mouth: Mucous membranes are dry  Pharynx: No oropharyngeal exudate  Eyes:      General: No scleral icterus  Right eye: No discharge  Left eye: No discharge  Conjunctiva/sclera: Conjunctivae normal       Pupils: Pupils are equal, round, and reactive to light   " Neck:      Thyroid: No thyromegaly  Vascular: No JVD  Trachea: No tracheal deviation  Cardiovascular:      Rate and Rhythm: Normal rate and regular rhythm  Heart sounds: Heart sounds are distant  No murmur heard  No friction rub  No gallop  Pulmonary:      Effort: Pulmonary effort is normal  No respiratory distress  Breath sounds: Normal breath sounds  No wheezing or rales  Chest:      Chest wall: No tenderness  Abdominal:      General: Bowel sounds are normal  There is no distension  Palpations: Abdomen is soft  There is no mass  Tenderness: There is no abdominal tenderness  There is no guarding or rebound  Musculoskeletal:         General: No tenderness  Normal range of motion  Cervical back: Normal range of motion and neck supple  Thoracic back: Deformity (thoracic kyphosis ) present  Right lower le+ Pitting Edema present  Left lower le+ Pitting Edema present  Lymphadenopathy:      Cervical: No cervical adenopathy  Skin:     General: Skin is warm and dry  Coloration: Skin is not pale  Findings: No erythema or rash  Neurological:      Mental Status: He is alert and oriented to person, place, and time  Cranial Nerves: No cranial nerve deficit  Motor: No abnormal muscle tone  Coordination: Coordination normal       Deep Tendon Reflexes: Reflexes are normal and symmetric  Psychiatric:         Behavior: Behavior normal            Appointment on 2023   Component Date Value Ref Range Status   • Hemoglobin A1C 2023 6 5 (H)  Normal 3 8-5 6%; PreDiabetic 5 7-6 4%;  Diabetic >=6 5%; Glycemic control for adults with diabetes <7 0% % Final   • EAG 2023 140  mg/dl Final   Appointment on 03/10/2023   Component Date Value Ref Range Status   • WBC 03/10/2023 10 50 (H)  4 31 - 10 16 Thousand/uL Final   • RBC 03/10/2023 4 59  3 88 - 5 62 Million/uL Final   • Hemoglobin 03/10/2023 14 3  12 0 - 17 0 g/dL Final • Hematocrit 03/10/2023 42 5  36 5 - 49 3 % Final   • MCV 03/10/2023 93  82 - 98 fL Final   • MCH 03/10/2023 31 2  26 8 - 34 3 pg Final   • MCHC 03/10/2023 33 6  31 4 - 37 4 g/dL Final   • RDW 03/10/2023 13 4  11 6 - 15 1 % Final   • MPV 03/10/2023 10 2  8 9 - 12 7 fL Final   • Platelets 70/92/8343 316  149 - 390 Thousands/uL Final   • nRBC 03/10/2023 0  /100 WBCs Final   • Neutrophils Relative 03/10/2023 64  43 - 75 % Final   • Immat GRANS % 03/10/2023 2  0 - 2 % Final   • Lymphocytes Relative 03/10/2023 22  14 - 44 % Final   • Monocytes Relative 03/10/2023 9  4 - 12 % Final   • Eosinophils Relative 03/10/2023 2  0 - 6 % Final   • Basophils Relative 03/10/2023 1  0 - 1 % Final   • Neutrophils Absolute 03/10/2023 6 79  1 85 - 7 62 Thousands/µL Final   • Immature Grans Absolute 03/10/2023 0 17  0 00 - 0 20 Thousand/uL Final   • Lymphocytes Absolute 03/10/2023 2 33  0 60 - 4 47 Thousands/µL Final   • Monocytes Absolute 03/10/2023 0 89  0 17 - 1 22 Thousand/µL Final   • Eosinophils Absolute 03/10/2023 0 24  0 00 - 0 61 Thousand/µL Final   • Basophils Absolute 03/10/2023 0 08  0 00 - 0 10 Thousands/µL Final   • TSH 3RD GENERATON 03/10/2023 2 170  0 450 - 4 500 uIU/mL Final    Adult TSH (3rd generation) reference range follows the recommended guidelines of the American Thyroid Association, January, 2020  • Sodium 03/10/2023 138  135 - 147 mmol/L Final   • Potassium 03/10/2023 3 9  3 5 - 5 3 mmol/L Final   • Chloride 03/10/2023 104  96 - 108 mmol/L Final   • CO2 03/10/2023 25  21 - 32 mmol/L Final   • ANION GAP 03/10/2023 9  4 - 13 mmol/L Final   • BUN 03/10/2023 15  5 - 25 mg/dL Final   • Creatinine 03/10/2023 0 72  0 60 - 1 30 mg/dL Final    Standardized to IDMS reference method   • Glucose, Fasting 03/10/2023 131 (H)  65 - 99 mg/dL Final    Specimen collection should occur prior to Sulfasalazine administration due to the potential for falsely depressed results   Specimen collection should occur prior to Sulfapyridine administration due to the potential for falsely elevated results  • Calcium 03/10/2023 9 4  8 3 - 10 1 mg/dL Final   • AST 03/10/2023 41  5 - 45 U/L Final    Specimen collection should occur prior to Sulfasalazine administration due to the potential for falsely depressed results  • ALT 03/10/2023 85 (H)  12 - 78 U/L Final    Specimen collection should occur prior to Sulfasalazine and/or Sulfapyridine administration due to the potential for falsely depressed results  • Alkaline Phosphatase 03/10/2023 70  46 - 116 U/L Final   • Total Protein 03/10/2023 7 7  6 4 - 8 4 g/dL Final   • Albumin 03/10/2023 3 7  3 5 - 5 0 g/dL Final   • Total Bilirubin 03/10/2023 0 63  0 20 - 1 00 mg/dL Final    Use of this assay is not recommended for patients undergoing treatment with eltrombopag due to the potential for falsely elevated results  • eGFR 03/10/2023 113  ml/min/1 73sq m Final   • Cholesterol 03/10/2023 179  See Comment mg/dL Final    Cholesterol:         Pediatric <18 Years        Desirable          <170 mg/dL      Borderline High    170-199 mg/dL      High               >=200 mg/dL        Adult >=18 Years            Desirable         <200 mg/dL      Borderline High   200-239 mg/dL      High              >239 mg/dL     • Triglycerides 03/10/2023 101  See Comment mg/dL Final    Triglyceride:     0-9Y            <75mg/dL     10Y-17Y         <90 mg/dL       >=18Y     Normal          <150 mg/dL     Borderline High 150-199 mg/dL     High            200-499 mg/dL        Very High       >499 mg/dL    Specimen collection should occur prior to N-Acetylcysteine or Metamizole administration due to the potential for falsely depressed results  • HDL, Direct 03/10/2023 45  >=40 mg/dL Final    Specimen collection should occur prior to Metamizole administration due to the potential for falsley depressed results     • LDL Calculated 03/10/2023 114 (H)  0 - 100 mg/dL Final    LDL Cholesterol:     Optimal           <100 mg/dl     Near Optimal      100-129 mg/dl     Above Optimal       Borderline High 130-159 mg/dl       High            160-189 mg/dl       Very High       >189 mg/dl         This screening LDL is a calculated result  It does not have the accuracy of the Direct Measured LDL in the monitoring of patients with hyperlipidemia and/or statin therapy  Direct Measure LDL (DFO699) must be ordered separately in these patients     • Non-HDL-Chol (CHOL-HDL) 03/10/2023 134  mg/dl Final   • Color, UA 03/10/2023 Light Yellow   Final   • Clarity, UA 03/10/2023 Clear   Final   • Specific Gravity, UA 03/10/2023 1 022  1 003 - 1 030 Final   • pH, UA 03/10/2023 6 5  4 5, 5 0, 5 5, 6 0, 6 5, 7 0, 7 5, 8 0 Final   • Leukocytes, UA 03/10/2023 Negative  Negative Final   • Nitrite, UA 03/10/2023 Negative  Negative Final   • Protein, UA 03/10/2023 Trace (A)  Negative mg/dl Final   • Glucose, UA 03/10/2023 Negative  Negative mg/dl Final   • Ketones, UA 03/10/2023 Negative  Negative mg/dl Final   • Urobilinogen, UA 03/10/2023 <2 0  <2 0 mg/dl mg/dl Final   • Bilirubin, UA 03/10/2023 Negative  Negative Final   • Occult Blood, UA 03/10/2023 Small (A)  Negative Final   • RBC, UA 03/10/2023 2-4 (A)  None Seen, 1-2 /hpf Final   • WBC, UA 03/10/2023 1-2  None Seen, 1-2 /hpf Final   • Epithelial Cells 03/10/2023 None Seen  None Seen, Occasional /hpf Final   • Bacteria, UA 03/10/2023 None Seen  None Seen, Occasional /hpf Final   • MUCUS THREADS 03/10/2023 Occasional (A)  None Seen Final   Admission on 10/12/2022, Discharged on 10/12/2022   Component Date Value Ref Range Status   • Case Report 10/12/2022    Final                    Value:Surgical Pathology Report                         Case: [id number]                                   Authorizing Provider:  Judi Bliss MD   Collected:           10/12/2022 0936              Ordering Location:     [address] [place] [address]      Received:            10/12/2022 [facility]                                                      Pathologist:           Lalo Osorio MD                                                             Specimen:    Skin, Cyst/Tag/Debridement, Perianal Skin Tag                                             • Final Diagnosis 10/12/2022    Final                    Value: This result contains rich text formatting which cannot be displayed here  • Note 10/12/2022    Final                    Value: This result contains rich text formatting which cannot be displayed here  • Additional Information 10/12/2022    Final                    Value: This result contains rich text formatting which cannot be displayed here  • Gross Description 10/12/2022    Final                    Value: This result contains rich text formatting which cannot be displayed here  • Clinical Information 10/12/2022    Final                    Value:perianal skin tag with purulent drainage in the right anterior lateral location  This proximally 5 cm from the anal verge  There was scarring near the anterior midline concerning for primary opening for fistula in ANO  Lab on 09/16/2022   Component Date Value Ref Range Status   • Total Bilirubin 09/16/2022 0 32  0 20 - 1 00 mg/dL Final    Use of this assay is not recommended for patients undergoing treatment with eltrombopag due to the potential for falsely elevated results  • Bilirubin, Direct 09/16/2022 0 06  0 00 - 0 20 mg/dL Final   • Alkaline Phosphatase 09/16/2022 73  46 - 116 U/L Final   • AST 09/16/2022 56 (H)  5 - 45 U/L Final    Slightly Hemolyzed; Results May be Affected  Specimen collection should occur prior to Sulfasalazine and/or Sulfapyridine administration due to the potential for falsely depressed results  • ALT 09/16/2022 108 (H)  12 - 78 U/L Final    Specimen collection should occur prior to Sulfasalazine and/or Sulfapyridine administration due to the potential for falsely depressed results  • Total Protein 09/16/2022 8 1  6 4 - 8 4 g/dL Final   • Albumin 09/16/2022 3 6  3 5 - 5 0 g/dL Final   • Sodium 09/16/2022 138  135 - 147 mmol/L Final   • Potassium 09/16/2022 4 1  3 5 - 5 3 mmol/L Final    Slightly Hemolyzed; Results May be Affected   • Chloride 09/16/2022 108  96 - 108 mmol/L Final   • CO2 09/16/2022 23  21 - 32 mmol/L Final   • ANION GAP 09/16/2022 7  4 - 13 mmol/L Final   • BUN 09/16/2022 11  5 - 25 mg/dL Final   • Creatinine 09/16/2022 0 77  0 60 - 1 30 mg/dL Final    Standardized to IDMS reference method   • Glucose, Fasting 09/16/2022 97  65 - 99 mg/dL Final    Specimen collection should occur prior to Sulfasalazine administration due to the potential for falsely depressed results  Specimen collection should occur prior to Sulfapyridine administration due to the potential for falsely elevated results     • Calcium 09/16/2022 9 4  8 3 - 10 1 mg/dL Final   • eGFR 09/16/2022 111  ml/min/1 73sq m Final   • WBC 09/16/2022 12 10 (H)  4 31 - 10 16 Thousand/uL Final   • RBC 09/16/2022 4 60  3 88 - 5 62 Million/uL Final   • Hemoglobin 09/16/2022 14 1  12 0 - 17 0 g/dL Final   • Hematocrit 09/16/2022 44 4  36 5 - 49 3 % Final   • MCV 09/16/2022 97  82 - 98 fL Final   • MCH 09/16/2022 30 7  26 8 - 34 3 pg Final   • MCHC 09/16/2022 31 8  31 4 - 37 4 g/dL Final   • RDW 09/16/2022 13 2  11 6 - 15 1 % Final   • MPV 09/16/2022 10 5  8 9 - 12 7 fL Final   • Platelets 38/00/4385 341  149 - 390 Thousands/uL Final   • nRBC 09/16/2022 0  /100 WBCs Final   • Neutrophils Relative 09/16/2022 61  43 - 75 % Final   • Immat GRANS % 09/16/2022 2  0 - 2 % Final   • Lymphocytes Relative 09/16/2022 24  14 - 44 % Final   • Monocytes Relative 09/16/2022 9  4 - 12 % Final   • Eosinophils Relative 09/16/2022 3  0 - 6 % Final   • Basophils Relative 09/16/2022 1  0 - 1 % Final   • Neutrophils Absolute 09/16/2022 7 35  1 85 - 7 62 Thousands/µL Final   • Immature Grans Absolute 09/16/2022 0 28 (H)  0 00 - 0 20 Thousand/uL Final • Lymphocytes Absolute 09/16/2022 2 94  0 60 - 4 47 Thousands/µL Final   • Monocytes Absolute 09/16/2022 1 05  0 17 - 1 22 Thousand/µL Final   • Eosinophils Absolute 09/16/2022 0 36  0 00 - 0 61 Thousand/µL Final   • Basophils Absolute 09/16/2022 0 12 (H)  0 00 - 0 10 Thousands/µL Final   Office Visit on 07/15/2022   Component Date Value Ref Range Status   • LEUKOCYTE ESTERASE,UA 07/15/2022 neg   Final   • Linda Silvius 07/15/2022 neg   Final   • SL AMB POCT UROBILINOGEN 07/15/2022 0 2   Final   • POCT URINE PROTEIN 07/15/2022 neg   Final   •  PH,UA 07/15/2022 5 0   Final   • BLOOD,UA 07/15/2022 large   Final   • SPECIFIC GRAVITY,UA 07/15/2022 1 015   Final   • KETONES,UA 07/15/2022 neg   Final   • BILIRUBIN,UA 07/15/2022 neg   Final   • GLUCOSE, UA 07/15/2022 neg   Final   •  COLOR,UA 07/15/2022 yellow   Final   • CLARITY,UA 07/15/2022 clear   Final   • Color, UA 07/15/2022 Light Yellow   Final   • Clarity, UA 07/15/2022 Clear   Final   • Specific Gravity, UA 07/15/2022 1 017  1 003 - 1 030 Final   • pH, UA 07/15/2022 6 0  4 5, 5 0, 5 5, 6 0, 6 5, 7 0, 7 5, 8 0 Final   • Leukocytes, UA 07/15/2022 Negative  Negative Final   • Nitrite, UA 07/15/2022 Negative  Negative Final   • Protein, UA 07/15/2022 Negative  Negative mg/dl Final   • Glucose, UA 07/15/2022 Negative  Negative mg/dl Final   • Ketones, UA 07/15/2022 Negative  Negative mg/dl Final   • Urobilinogen, UA 07/15/2022 <2 0  <2 0 mg/dl mg/dl Final   • Bilirubin, UA 07/15/2022 Negative  Negative Final   • Occult Blood, UA 07/15/2022 Small (A)  Negative Final   • RBC, UA 07/15/2022 1-2  None Seen, 1-2 /hpf Final   • WBC, UA 07/15/2022 2-4 (A)  None Seen, 1-2 /hpf Final   • Epithelial Cells 07/15/2022 Occasional  None Seen, Occasional /hpf Final   • Bacteria, UA 07/15/2022 None Seen  None Seen, Occasional /hpf Final   • MUCUS THREADS 07/15/2022 Moderate (A)  None Seen Final   • Urine Culture 07/15/2022 No Growth <1000 cfu/mL   Final

## 2023-08-22 DIAGNOSIS — I10 UNCONTROLLED STAGE 2 HYPERTENSION: ICD-10-CM

## 2023-08-22 RX ORDER — AMLODIPINE BESYLATE 10 MG/1
10 TABLET ORAL DAILY
Qty: 90 TABLET | Refills: 1 | Status: SHIPPED | OUTPATIENT
Start: 2023-08-22

## 2023-09-29 ENCOUNTER — RA CDI HCC (OUTPATIENT)
Dept: OTHER | Facility: HOSPITAL | Age: 43
End: 2023-09-29

## 2023-09-29 NOTE — PROGRESS NOTES
[address] [facility] coding opportunities          Chart Reviewed number of suggestions sent to Provider: 1   e11.9    Patients Insurance        Commercial Insurance: [employer]

## 2023-10-06 ENCOUNTER — OFFICE VISIT (OUTPATIENT)
Dept: INTERNAL MEDICINE CLINIC | Age: 43
End: 2023-10-06
Payer: COMMERCIAL

## 2023-10-06 VITALS
BODY MASS INDEX: 37.77 KG/M2 | SYSTOLIC BLOOD PRESSURE: 120 MMHG | DIASTOLIC BLOOD PRESSURE: 70 MMHG | OXYGEN SATURATION: 96 % | HEART RATE: 78 BPM | WEIGHT: 255 LBS | HEIGHT: 69 IN | TEMPERATURE: 98.2 F

## 2023-10-06 DIAGNOSIS — E78.49 OTHER HYPERLIPIDEMIA: ICD-10-CM

## 2023-10-06 DIAGNOSIS — R39.15 URGENCY OF URINATION: ICD-10-CM

## 2023-10-06 DIAGNOSIS — R74.01 TRANSAMINITIS: ICD-10-CM

## 2023-10-06 DIAGNOSIS — E11.9 TYPE 2 DIABETES MELLITUS WITHOUT COMPLICATION, WITHOUT LONG-TERM CURRENT USE OF INSULIN (HCC): Primary | ICD-10-CM

## 2023-10-06 DIAGNOSIS — Z23 FLU VACCINE NEED: ICD-10-CM

## 2023-10-06 DIAGNOSIS — I10 ESSENTIAL HYPERTENSION: ICD-10-CM

## 2023-10-06 PROBLEM — R73.9 HYPERGLYCEMIA: Status: RESOLVED | Noted: 2023-03-17 | Resolved: 2023-10-06

## 2023-10-06 LAB
SL AMB  POCT GLUCOSE, UA: NORMAL
SL AMB LEUKOCYTE ESTERASE,UA: NORMAL
SL AMB POCT BILIRUBIN,UA: NORMAL
SL AMB POCT BLOOD,UA: NORMAL
SL AMB POCT CLARITY,UA: CLEAR
SL AMB POCT COLOR,UA: YELLOW
SL AMB POCT KETONES,UA: NORMAL
SL AMB POCT NITRITE,UA: NORMAL
SL AMB POCT PH,UA: 5.5
SL AMB POCT SPECIFIC GRAVITY,UA: 1.02
SL AMB POCT URINE PROTEIN: NORMAL
SL AMB POCT UROBILINOGEN: NORMAL

## 2023-10-06 PROCEDURE — 90471 IMMUNIZATION ADMIN: CPT

## 2023-10-06 PROCEDURE — 81003 URINALYSIS AUTO W/O SCOPE: CPT

## 2023-10-06 PROCEDURE — 99215 OFFICE O/P EST HI 40 MIN: CPT | Performed by: INTERNAL MEDICINE

## 2023-10-06 PROCEDURE — 90686 IIV4 VACC NO PRSV 0.5 ML IM: CPT

## 2023-10-06 RX ORDER — BLOOD-GLUCOSE METER
KIT MISCELLANEOUS
Qty: 1 KIT | Refills: 0 | Status: SHIPPED | OUTPATIENT
Start: 2023-10-06

## 2023-10-06 RX ORDER — LANCETS 33 GAUGE
EACH MISCELLANEOUS
Qty: 100 EACH | Refills: 3 | Status: SHIPPED | OUTPATIENT
Start: 2023-10-06

## 2023-10-06 RX ORDER — BLOOD SUGAR DIAGNOSTIC
STRIP MISCELLANEOUS
Qty: 100 EACH | Refills: 3 | Status: SHIPPED | OUTPATIENT
Start: 2023-10-06

## 2023-10-06 RX ORDER — METFORMIN HYDROCHLORIDE 500 MG/1
500 TABLET, EXTENDED RELEASE ORAL
Qty: 90 TABLET | Refills: 1 | Status: SHIPPED | OUTPATIENT
Start: 2023-10-06 | End: 2024-04-03

## 2023-10-06 NOTE — PATIENT INSTRUCTIONS
CALL THE OFFICE IF YOUR FASTING BLOOD GLUCOSE IS CONSISTENTLY HIGHER THAN 120 OR IF YOUR OTHER BLOOD GLUCOSE IS CONSISTENTLY HIGHER THAN 160        Type 2 Diabetes Management for Adults   AMBULATORY CARE:   Type 2 diabetes  is a disease that affects how your body uses glucose (sugar). Either your body cannot make enough insulin, or it cannot use the insulin correctly. It is important to keep diabetes controlled to prevent damage to your heart, blood vessels, and other organs. Management will help you feel well and enjoy your daily activities. Your diabetes care team providers can help you make a plan to fit diabetes care into your schedule. Your plan can change over time to fit your needs and your family's needs. Have someone call your local emergency number (911 in the [address]) if:   You cannot be woken. You have signs of diabetic ketoacidosis:     confusion, fatigue    vomiting    rapid heartbeat    fruity smelling breath    extreme thirst    dry mouth and skin    You have any of the following signs of a heart attack:      Squeezing, pressure, or pain in your chest    You may  also have any of the following:     Discomfort or pain in your back, neck, jaw, stomach, or arm    Shortness of breath    Nausea or vomiting    Lightheadedness or a sudden cold sweat    You have any of the following signs of a stroke:      Numbness or drooping on one side of your face     Weakness in an arm or leg    Confusion or difficulty speaking    Dizziness, a severe headache, or vision loss    Call your doctor or diabetes care team provider if:   You have a sore or wound that will not heal.    You have a change in the amount you urinate. Your blood sugar levels are higher than your target goals. You often have lower blood sugar levels than your target goals. Your skin is red, dry, warm, or swollen. You have trouble coping with diabetes, or you feel anxious or depressed.     You have trouble following any part of your care plan, such as your meal plan. You have questions or concerns about your condition or care. What you need to know about high blood sugar levels:  High blood sugar levels may not cause any symptoms. You may feel more thirsty or urinate more often than usual. Over time, high blood sugar levels can damage your nerves, blood vessels, tissues, and organs. The following can increase your blood sugar levels:  Large meals or large amounts of carbohydrates at one time    Less physical activity    Stress    Illness    A lower dose of diabetes medicine or insulin, or a late dose    What you need to know about low blood sugar levels:  Symptoms include feeling shaky, dizzy, irritable, or confused. You can prevent symptoms by keeping your blood sugar levels from going too low. Treat a low blood sugar level right away:      Drink 4 ounces of juice or have 1 tube of glucose gel. Check your blood sugar level again 10 to 15 minutes later. When the level goes back to normal, eat a meal or snack to prevent another decrease. Keep glucose gel, raisins, or hard candy with you at all times to treat a low blood sugar level. Your blood sugar level can get too low if you take diabetes medicine or insulin and do not eat enough food. If you use insulin, check your blood sugar level before you exercise. If your blood sugar level is below 100 mg/dL, eat 4 crackers or 2 ounces of raisins, or drink 4 ounces of juice. Check your level every 30 minutes if you exercise longer than 1 hour. You may need a snack during or after exercise. What you can do to manage your blood sugar levels:   Check your blood sugar levels as directed and as needed. Several items are available to use to check your levels. You may need to check by testing a drop of blood in a glucose monitor. You may instead be given a continuous glucose monitoring (CGM) device. The device is worn at all times.  The CGM checks your blood sugar level every 5 minutes. It sends results to an electronic device such as a smart phone. A CGM can be used with or without an insulin pump. You and your diabetes care team providers will decide on the best method for you. The goal for blood sugar levels before meals  is between 80 and 130 mg/dL and 2 hours after eating  is lower than 180 mg/dL. Make healthy food choices. Work with a dietitian to create a meal plan that works for you and your schedule. A dietitian can help you learn how to eat the right amount of carbohydrates (sugar and starchy foods) during your meals and snacks. Examples of carbohydrates are breads, cereals, rice, pasta, fruit, low-fat dairy, and sweets. Carbohydrates can raise your blood sugar level if you eat too many at one time. Eat high-fiber foods as directed. Fiber helps improve blood sugar levels. Fiber also lowers your risk for heart disease and other problems diabetes can cause. Examples of high-fiber foods include vegetables, whole-grain bread, and beans such as mendoza beans. Your dietitian can tell you how much fiber to have each day. Get regular physical activity. Physical activity can help you get to your target blood sugar level goal and manage your weight. Get at least 150 minutes of moderate to vigorous aerobic physical activity each week. Resistance training, such as lifting weights, should be done 3 times each week. Do not miss more than 2 days of physical activity in a row. Do not sit longer than 30 minutes at a time. Your healthcare provider can help you create an activity plan. The plan can include the best activities for you and can help you build your strength and endurance. Maintain a healthy weight. Ask your team what a healthy weight is for you. A healthy weight can help you control diabetes and prevent heart disease. Ask your team to help you create a weight-loss plan, if needed.  Even a loss of 3% to 7% of your excess body weight can help make a difference in managing diabetes. Your team will help you set a weight-loss goal, such as 10 to 15 pounds, or 5% of your extra weight. Together you and your team can set manageable weight-loss goals. Take your diabetes medicine or insulin as directed. You may need diabetes medicine, insulin, or both to help control your blood sugar levels. Your healthcare provider will teach you how and when to take your diabetes medicine or insulin. You will also be taught about side effects oral diabetes medicine can cause. Insulin may be injected or given through a pump or pen. You and your providers will decide on the best method for you: An insulin pump  is an implanted device that gives your insulin 24 hours a day. An insulin pump prevents the need for multiple insulin injections in a day. An insulin pen  is a device prefilled with the right amount of insulin. You and your family members will be taught how to draw up and give insulin  if this is the best method for you. Your providers will also teach you how to dispose of needles and syringes. You will learn how much insulin you need  and when to give it. You will be taught when not to give insulin. You will also be taught what to do if your blood sugar level drops too low. This may happen if you take insulin and do not eat the right amount of carbohydrates. More ways to manage type 2 diabetes:   Wear medical alert identification. Wear medical alert jewelry or carry a card that says you have diabetes. Ask your provider where to get these items. Do not smoke. Nicotine and other chemicals in cigarettes and cigars can cause lung and blood vessel damage. It also makes it more difficult to manage your diabetes. Ask your provider for information if you currently smoke and need help to quit. Do not use e-cigarettes or smokeless tobacco in place of cigarettes or to help you quit. They still contain nicotine.     Check your feet each day for cuts, scratches, calluses, or other wounds. Look for redness and swelling, and feel for warmth. Wear shoes that fit well. Check your shoes for rocks or other objects that can hurt your feet. Do not walk barefoot or wear shoes without socks. Wear cotton socks to help keep your feet dry. Ask about vaccines you may need. You have a higher risk for serious illness if you get the flu, pneumonia, COVID-19, or hepatitis. Ask your provider if you should get vaccines to prevent these or other diseases, and when to get the vaccines. Talk to your provider if you become stressed about diabetes care. Sometimes being able to fit diabetes care into your life can cause increased stress. The stress can cause you not to take care of yourself properly. Your provider can help by offering tips about self-care. A mental health provider can listen and offer help with self-care issues. Other types of counseling can help you make nutrition or physical activity changes. Have your A1c checked as directed. Your provider may check your A1c every 3 months, or 2 times each year if your diabetes is controlled. An A1c test shows the average amount of sugar in your blood over the past 2 to 3 months. Your provider will tell you what your A1c level should be. Have screening tests as directed. Your provider may recommend screening for complications of diabetes and other conditions that may develop. Some screenings may begin right away and some may happen within the first 5 years of diagnosis:    Examples of diabetes complications  include kidney problems, high cholesterol, high blood pressure, blood vessel problems, eye problems, and sleep apnea. You may be screened for a low vitamin B level  if you take oral diabetes medicine for a long time. You may be screened for polycystic ovarian syndrome (PCOS)  if you are of childbearing age. Follow up with your doctor or diabetes care team providers as directed:   You may need to have blood tests done before your follow-up visit. The test results will show if changes need to be made in your treatment or self-care. Talk to your provider if you cannot afford your medicine. Write down your questions so you remember to ask them during your visits. © Copyright [employer] 2023 Information is for End User's use only and may not be sold, redistributed or otherwise used for commercial purposes. The above information is an  only. It is not intended as medical advice for individual conditions or treatments. Talk to your doctor, nurse or pharmacist before following any medical regimen to see if it is safe and effective for you.

## 2023-10-06 NOTE — PROGRESS NOTES
Assessment/Plan:    Diabetes mellitus type 2  -Patient has new onset diabetes mellitus and blood glucose is well controlled , last hemoglobin A1c was 6.9 on 6/30/2023  -We will start patient on metformin  mg daily  -continue with a diabetic diet low in carbohydrates and simple sugars  -continue with exercise  -We will prescribe patient with glucometer, test strips, lancets and referred him for diabetic education classes  -He was counseled to monitor his blood glucose and to keep a blood sugar log and bring it into his next visit  - we will order hemoglobin A1 c prior to next visit    Hyperlipidemia  -Lipids are improving  - last lipid panel done on 3/10/2023, showed a total cholesterol of 179, down from 208 on 1/17/2022,  A triglyceride of 101, up from 93, an HDL of 45, down from 51 and an LDL of 114, down from 138  -continue with a heart healthy diet and with exercise    Transaminitis  -Mildly worsening  -We will order repeat CMP and also order an ultrasound of the right upper quadrant  -Of note, chronic hepatitis panel was negative    Essential hypertension   - blood pressure is well controlled   -continue with losartan-hydrochlorothiazide and amlodipine  -continue with a low-salt diet and with exercise    Urinary urgency  -Likely secondary to diabetes mellitus  -Point-of-care urine dipstick was positive for greater than 1000 mg/dL of glucose and 40 mg/dL of ketones and lysed blood which is chronic for him. It was negative for nitrites and leukocytes. Need for influenza vaccine  -Patient will receive a flu shot today     Diagnoses and all orders for this visit:    Type 2 diabetes mellitus without complication, without long-term current use of insulin (HCC)  -     POCT urine dip  -     metFORMIN (GLUCOPHAGE-XR) 500 mg 24 hr tablet; Take 1 tablet (500 mg total) by mouth daily with breakfast  -     Hemoglobin A1C; Future  -     Blood Glucose Monitoring Suppl (OneTouch Verio Reflect) w/Device KIT;  Check blood sugars once daily. Please substitute with appropriate alternative as covered by patient's insurance. Dx: E11.65  -     glucose blood (OneTouch Verio) test strip; Check blood sugars once daily. Please substitute with appropriate alternative as covered by patient's insurance. Dx: E11.65  -     OneTouch Delica Lancets 46L MISC; Check blood sugars once daily. Please substitute with appropriate alternative as covered by patient's insurance. Dx: E11.65  -     Ambulatory referral to Diabetic Education; Future    Flu vaccine need  -     influenza vaccine, quadrivalent, 0.5 mL, preservative-free, for adult and pediatric patients 6 mos+ (AFLURIA, FLUARIX, FLULAVAL, FLUZONE)    Other hyperlipidemia    Essential hypertension  -     Comprehensive metabolic panel; Future    Transaminitis  -     Comprehensive metabolic panel; Future  -     US right upper quadrant; Future    Urgency of urination  -     POCT urine dip    Other orders  -     Hemoglobin A1C; Future          Depression Screening and Follow-up Plan: Patient was screened for depression during today's encounter. They screened negative with a PHQ-2 score of 0. Subjective:      Patient ID: Kira Foreman is a 38 y.o. male. HPI    Patient presents for a follow-up visit regarding his new onset diabetes mellitus type 2, transaminitis, essential hypertension and hyperlipidemia. He states that he  has been taking his  medications as prescribed. Today pt states that he has been working on diet and exercise and has lost wt. I had sent him a message 3 -4 months ago regarding his new onset diabetes and told him to schedule a follow-up visit so we can start managing his diabetes but he states that he would like to recheck his labs now that he has lost weight. Of note, he states that he lost about 25 pounds in 6 months.   He complains that he started having more urgency for the pat 4-6 weeks , + polyuria, polydipsia, polyphagia, occasional fatigue and visual impairment. Got new glasses and noticed that when he is driving, the traffic signs are more difficult to see sometimes. His vision improves some other times. Saw an optometrist and had his glasses changed and still finds it dificult to see. He also admits to urinary frequency and nocturia x1-2 as well as sleep disturbance but denies dysuria, hematuria, small volume urine, abdominal pain, flank pain, fever, chills, night sweats, nasal congestion, rhinorrhea, cough, chest pain, shortness of breath, palpitations, nausea, vomiting, abdominal pain, diarrhea, constipation, myalgias or arthralgias. The following portions of the patient's history were reviewed and updated as appropriate:   He  has a past medical history of Hypertension (12/2021). 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 His family history includes Cancer in his father; Diabetes in his father; Hypertension in his father and mother. He  reports that he has never smoked. He has never used smokeless tobacco. He reports current alcohol use. He reports that he does not use drugs.   Current Outpatient Medications   Medication Sig Dispense Refill   • amLODIPine (NORVASC) 10 mg tablet Take 1 tablet (10 mg total) by mouth daily 90 tablet 1   • Blood Glucose Monitoring Suppl (OneTouch Verio Reflect) w/Device KIT Check blood sugars once daily. Please substitute with appropriate alternative as covered by patient's insurance. Dx: E11.65 1 kit 0   • glucose blood (OneTouch Verio) test strip Check blood sugars once daily. Please substitute with appropriate alternative as covered by patient's insurance. Dx: E11.65 100 each 3   • losartan-hydrochlorothiazide (HYZAAR) 100-25 MG per tablet Take 1 tablet by mouth daily 90 tablet 0   • metFORMIN (GLUCOPHAGE-XR) 500 mg 24 hr tablet Take 1 tablet (500 mg total) by mouth daily with breakfast 90 tablet 1   • OneTouch Delica Lancets 09F MISC Check blood sugars once daily. Please substitute with appropriate alternative as covered by patient's insurance. Dx: E11.65 100 each 3     No current facility-administered medications for this visit. Current Outpatient Medications on File Prior to Visit   Medication Sig   • amLODIPine (NORVASC) 10 mg tablet Take 1 tablet (10 mg total) by mouth daily   • losartan-hydrochlorothiazide (HYZAAR) 100-25 MG per tablet Take 1 tablet by mouth daily     No current facility-administered medications on file prior to visit. He has No Known Allergies. .    Review of Systems   Constitutional: Positive for fatigue. Negative for activity change, chills, fever and unexpected weight change. HENT: Negative for ear pain, postnasal drip, rhinorrhea, sinus pressure and sore throat. Eyes: Positive for visual disturbance. Negative for pain. Respiratory: Negative for cough, choking, chest tightness, shortness of breath and wheezing. Cardiovascular: Negative for chest pain, palpitations and leg swelling. Gastrointestinal: Negative for abdominal pain, constipation, diarrhea, nausea and vomiting. Endocrine: Positive for polydipsia, polyphagia and polyuria.    Genitourinary: Positive for frequency (with nocturia x 1-2) and urgency. Negative for dysuria and hematuria. Musculoskeletal: Negative for arthralgias, back pain, gait problem, joint swelling, myalgias and neck stiffness. Skin: Negative for pallor and rash. Neurological: Negative for dizziness, tremors, seizures, syncope, light-headedness and headaches. Hematological: Negative for adenopathy. Psychiatric/Behavioral: Positive for sleep disturbance. Negative for behavioral problems. Objective:      /70 (BP Location: Left arm, Patient Position: Sitting, Cuff Size: Large)   Pulse 78   Temp 98.2 °F (36.8 °C) (Temporal)   Ht 5' 9" (1.753 m)   Wt 116 kg (255 lb)   SpO2 96%   BMI 37.66 kg/m²          Physical Exam  Constitutional:       General: He is not in acute distress. Appearance: He is well-developed. He is not diaphoretic. HENT:      Head: Normocephalic and atraumatic. Right Ear: External ear normal.      Left Ear: External ear normal.      Nose: Nose normal.      Mouth/Throat:      Mouth: Mucous membranes are dry. Pharynx: No oropharyngeal exudate. Comments: Dry mucous mb with Mallampati class 4 oropharynx    Eyes:      General: No scleral icterus. Right eye: No discharge. Left eye: No discharge. Conjunctiva/sclera: Conjunctivae normal.      Pupils: Pupils are equal, round, and reactive to light. Neck:      Thyroid: No thyromegaly. Vascular: No JVD. Trachea: No tracheal deviation. Cardiovascular:      Rate and Rhythm: Normal rate and regular rhythm. Heart sounds: Heart sounds are distant. No murmur heard. No friction rub. No gallop. Pulmonary:      Effort: Pulmonary effort is normal. No respiratory distress. Breath sounds: Decreased breath sounds present. No wheezing or rales. Chest:      Chest wall: No tenderness. Abdominal:      General: Bowel sounds are normal. There is no distension. Palpations: Abdomen is soft. There is no mass. Tenderness: There is no abdominal tenderness. There is no guarding or rebound. Comments: Waist circ - 48 "   Musculoskeletal:         General: No tenderness. Normal range of motion. Cervical back: Normal range of motion and neck supple. Thoracic back: Deformity (thoracic kyphosis) present. Right lower le+ Pitting Edema present. Left lower le+ Pitting Edema present. Lymphadenopathy:      Cervical: No cervical adenopathy. Skin:     General: Skin is warm and dry. Coloration: Skin is not pale. Findings: No erythema or rash. Neurological:      Mental Status: He is alert and oriented to person, place, and time. Cranial Nerves: No cranial nerve deficit. Motor: No abnormal muscle tone. Coordination: Coordination normal.      Deep Tendon Reflexes: Reflexes are normal and symmetric. Psychiatric:         Behavior: Behavior normal.           Office Visit on 10/06/2023   Component Date Value Ref Range Status   • LEUKOCYTE ESTERASE,UA 10/06/2023 -   Final   • Addison Chavez 10/06/2023 -   Final   • SL AMB POCT UROBILINOGEN 10/06/2023 0.2 E.U./dL   Final   • POCT URINE PROTEIN 10/06/2023 -   Final   •  PH,UA 10/06/2023 5.5   Final   • BLOOD,UA 10/06/2023 TRACE-LYSED   Final   • SPECIFIC GRAVITY,UA 10/06/2023 1.020   Final   • KETONES,UA 10/06/2023 40mg/dL   Final   • BILIRUBIN,UA 10/06/2023 SMALL   Final   • GLUCOSE, UA 10/06/2023 1000 mg/dL   Final   •  COLOR,UA 10/06/2023 YELLOW   Final   • CLARITY,UA 10/06/2023 CLEAR   Final   Appointment on 2023   Component Date Value Ref Range Status   • Hemoglobin A1C 2023 6.9 (H)  Normal 3.8-5.6%; PreDiabetic 5.7-6.4%;  Diabetic >=6.5%; Glycemic control for adults with diabetes <7.0% % Final   • EAG 2023 151  mg/dl Final   • Sodium 2023 138  135 - 147 mmol/L Final   • Potassium 2023 4.0  3.5 - 5.3 mmol/L Final   • Chloride 2023 108  96 - 108 mmol/L Final   • CO2 2023 26  21 - 32 mmol/L Final   • ANION GAP 06/30/2023 4  mmol/L Final   • BUN 06/30/2023 14  5 - 25 mg/dL Final   • Creatinine 06/30/2023 0.83  0.60 - 1.30 mg/dL Final    Standardized to IDMS reference method   • Glucose, Fasting 06/30/2023 159 (H)  65 - 99 mg/dL Final    Specimen collection should occur prior to Sulfasalazine administration due to the potential for falsely depressed results. Specimen collection should occur prior to Sulfapyridine administration due to the potential for falsely elevated results. • Calcium 06/30/2023 9.3  8.3 - 10.1 mg/dL Final   • AST 06/30/2023 77 (H)  5 - 45 U/L Final    Specimen collection should occur prior to Sulfasalazine administration due to the potential for falsely depressed results. • ALT 06/30/2023 127 (H)  12 - 78 U/L Final    Specimen collection should occur prior to Sulfasalazine and/or Sulfapyridine administration due to the potential for falsely depressed results. • Alkaline Phosphatase 06/30/2023 79  46 - 116 U/L Final   • Total Protein 06/30/2023 8.2  6.4 - 8.4 g/dL Final   • Albumin 06/30/2023 3.8  3.5 - 5.0 g/dL Final   • Total Bilirubin 06/30/2023 0.68  0.20 - 1.00 mg/dL Final    Use of this assay is not recommended for patients undergoing treatment with eltrombopag due to the potential for falsely elevated results. • eGFR 06/30/2023 107  ml/min/1.73sq m Final   Appointment on 03/25/2023   Component Date Value Ref Range Status   • Hemoglobin A1C 03/25/2023 6.5 (H)  Normal 3.8-5.6%; PreDiabetic 5.7-6.4%;  Diabetic >=6.5%; Glycemic control for adults with diabetes <7.0% % Final   • EAG 03/25/2023 140  mg/dl Final   Appointment on 03/10/2023   Component Date Value Ref Range Status   • WBC 03/10/2023 10.50 (H)  4.31 - 10.16 Thousand/uL Final   • RBC 03/10/2023 4.59  3.88 - 5.62 Million/uL Final   • Hemoglobin 03/10/2023 14.3  12.0 - 17.0 g/dL Final   • Hematocrit 03/10/2023 42.5  36.5 - 49.3 % Final   • MCV 03/10/2023 93  82 - 98 fL Final   • MCH 03/10/2023 31.2  26.8 - 34.3 pg Final   • MCHC 03/10/2023 33.6  31.4 - 37.4 g/dL Final   • RDW 03/10/2023 13.4  11.6 - 15.1 % Final   • MPV 03/10/2023 10.2  8.9 - 12.7 fL Final   • Platelets 89/58/3722 316  149 - 390 Thousands/uL Final   • nRBC 03/10/2023 0  /100 WBCs Final   • Neutrophils Relative 03/10/2023 64  43 - 75 % Final   • Immat GRANS % 03/10/2023 2  0 - 2 % Final   • Lymphocytes Relative 03/10/2023 22  14 - 44 % Final   • Monocytes Relative 03/10/2023 9  4 - 12 % Final   • Eosinophils Relative 03/10/2023 2  0 - 6 % Final   • Basophils Relative 03/10/2023 1  0 - 1 % Final   • Neutrophils Absolute 03/10/2023 6.79  1.85 - 7.62 Thousands/µL Final   • Immature Grans Absolute 03/10/2023 0.17  0.00 - 0.20 Thousand/uL Final   • Lymphocytes Absolute 03/10/2023 2.33  0.60 - 4.47 Thousands/µL Final   • Monocytes Absolute 03/10/2023 0.89  0.17 - 1.22 Thousand/µL Final   • Eosinophils Absolute 03/10/2023 0.24  0.00 - 0.61 Thousand/µL Final   • Basophils Absolute 03/10/2023 0.08  0.00 - 0.10 Thousands/µL Final   • TSH 3RD GENERATON 03/10/2023 2.170  0.450 - 4.500 uIU/mL Final    Adult TSH (3rd generation) reference range follows the recommended guidelines of the American Thyroid Association, January, 2020. • Sodium 03/10/2023 138  135 - 147 mmol/L Final   • Potassium 03/10/2023 3.9  3.5 - 5.3 mmol/L Final   • Chloride 03/10/2023 104  96 - 108 mmol/L Final   • CO2 03/10/2023 25  21 - 32 mmol/L Final   • ANION GAP 03/10/2023 9  4 - 13 mmol/L Final   • BUN 03/10/2023 15  5 - 25 mg/dL Final   • Creatinine 03/10/2023 0.72  0.60 - 1.30 mg/dL Final    Standardized to IDMS reference method   • Glucose, Fasting 03/10/2023 131 (H)  65 - 99 mg/dL Final    Specimen collection should occur prior to Sulfasalazine administration due to the potential for falsely depressed results. Specimen collection should occur prior to Sulfapyridine administration due to the potential for falsely elevated results.    • Calcium 03/10/2023 9.4  8.3 - 10.1 mg/dL Final   • AST 03/10/2023 41  5 - 45 U/L Final    Specimen collection should occur prior to Sulfasalazine administration due to the potential for falsely depressed results. • ALT 03/10/2023 85 (H)  12 - 78 U/L Final    Specimen collection should occur prior to Sulfasalazine and/or Sulfapyridine administration due to the potential for falsely depressed results. • Alkaline Phosphatase 03/10/2023 70  46 - 116 U/L Final   • Total Protein 03/10/2023 7.7  6.4 - 8.4 g/dL Final   • Albumin 03/10/2023 3.7  3.5 - 5.0 g/dL Final   • Total Bilirubin 03/10/2023 0.63  0.20 - 1.00 mg/dL Final    Use of this assay is not recommended for patients undergoing treatment with eltrombopag due to the potential for falsely elevated results. • eGFR 03/10/2023 113  ml/min/1.73sq m Final   • Cholesterol 03/10/2023 179  See Comment mg/dL Final    Cholesterol:         Pediatric <18 Years        Desirable          <170 mg/dL      Borderline High    170-199 mg/dL      High               >=200 mg/dL        Adult >=18 Years            Desirable         <200 mg/dL      Borderline High   200-239 mg/dL      High              >239 mg/dL     • Triglycerides 03/10/2023 101  See Comment mg/dL Final    Triglyceride:     0-9Y            <75mg/dL     10Y-17Y         <90 mg/dL       >=18Y     Normal          <150 mg/dL     Borderline High 150-199 mg/dL     High            200-499 mg/dL        Very High       >499 mg/dL    Specimen collection should occur prior to N-Acetylcysteine or Metamizole administration due to the potential for falsely depressed results. • HDL, Direct 03/10/2023 45  >=40 mg/dL Final    Specimen collection should occur prior to Metamizole administration due to the potential for falsley depressed results.    • LDL Calculated 03/10/2023 114 (H)  0 - 100 mg/dL Final    LDL Cholesterol:     Optimal           <100 mg/dl     Near Optimal      100-129 mg/dl     Above Optimal       Borderline High 130-159 mg/dl       High            160-189 mg/dl     Very High       >189 mg/dl         This screening LDL is a calculated result. It does not have the accuracy of the Direct Measured LDL in the monitoring of patients with hyperlipidemia and/or statin therapy. Direct Measure LDL (DPD072) must be ordered separately in these patients.    • Non-HDL-Chol (CHOL-HDL) 03/10/2023 134  mg/dl Final   • Color, UA 03/10/2023 Light Yellow   Final   • Clarity, UA 03/10/2023 Clear   Final   • Specific Gravity, UA 03/10/2023 1.022  1.003 - 1.030 Final   • pH, UA 03/10/2023 6.5  4.5, 5.0, 5.5, 6.0, 6.5, 7.0, 7.5, 8.0 Final   • Leukocytes, UA 03/10/2023 Negative  Negative Final   • Nitrite, UA 03/10/2023 Negative  Negative Final   • Protein, UA 03/10/2023 Trace (A)  Negative mg/dl Final   • Glucose, UA 03/10/2023 Negative  Negative mg/dl Final   • Ketones, UA 03/10/2023 Negative  Negative mg/dl Final   • Urobilinogen, UA 03/10/2023 <2.0  <2.0 mg/dl mg/dl Final   • Bilirubin, UA 03/10/2023 Negative  Negative Final   • Occult Blood, UA 03/10/2023 Small (A)  Negative Final   • RBC, UA 03/10/2023 2-4 (A)  None Seen, 1-2 /hpf Final   • WBC, UA 03/10/2023 1-2  None Seen, 1-2 /hpf Final   • Epithelial Cells 03/10/2023 None Seen  None Seen, Occasional /hpf Final   • Bacteria, UA 03/10/2023 None Seen  None Seen, Occasional /hpf Final   • MUCUS THREADS 03/10/2023 Occasional (A)  None Seen Final   Admission on 10/12/2022, Discharged on 10/12/2022   Component Date Value Ref Range Status   • Case Report 10/12/2022    Final                    Value:Surgical Pathology Report                         Case: [id number]                                   Authorizing Provider:  Vida Edouard MD   Collected:           10/12/2022 0936              Ordering Location:     [facility]      Received:            10/12/2022 [id number] [facility] Operating Room                                                      Pathologist:           Mihir Briggs MD Specimen:    Skin, Cyst/Tag/Debridement, Perianal Skin Tag                                             • Final Diagnosis 10/12/2022    Final                    Value: This result contains rich text formatting which cannot be displayed here. • Note 10/12/2022    Final                    Value: This result contains rich text formatting which cannot be displayed here. • Additional Information 10/12/2022    Final                    Value: This result contains rich text formatting which cannot be displayed here. • Gross Description 10/12/2022    Final                    Value: This result contains rich text formatting which cannot be displayed here. • Clinical Information 10/12/2022    Final                    Value:perianal skin tag with purulent drainage in the right anterior lateral location. This proximally 5 cm from the anal verge. There was scarring near the anterior midline concerning for primary opening for fistula in ANO.

## 2023-10-09 DIAGNOSIS — I10 UNCONTROLLED STAGE 2 HYPERTENSION: ICD-10-CM

## 2023-10-09 RX ORDER — LOSARTAN POTASSIUM AND HYDROCHLOROTHIAZIDE 25; 100 MG/1; MG/1
1 TABLET ORAL DAILY
Qty: 90 TABLET | Refills: 1 | Status: SHIPPED | OUTPATIENT
Start: 2023-10-09

## 2023-11-20 DIAGNOSIS — I10 UNCONTROLLED STAGE 2 HYPERTENSION: ICD-10-CM

## 2023-11-20 RX ORDER — AMLODIPINE BESYLATE 10 MG/1
10 TABLET ORAL DAILY
Qty: 90 TABLET | Refills: 1 | Status: SHIPPED | OUTPATIENT
Start: 2023-11-20

## 2024-01-11 ENCOUNTER — RA CDI HCC (OUTPATIENT)
Dept: OTHER | Facility: HOSPITAL | Age: 44
End: 2024-01-11

## 2024-01-11 NOTE — PROGRESS NOTES
HCC coding opportunities       Chart reviewed, no opportunity found: CHART REVIEWED, NO OPPORTUNITY FOUND   This is a reminder to address (resolve/update/assess) ALL HCC (risk adjustment) codes as found on active problem list for 2024 as patient scores reset to zero PILY.  Also, just a reminder to please review and assess all other chronic conditions for 2024     Patients Insurance        Commercial Insurance: [employer]

## 2024-01-13 ENCOUNTER — APPOINTMENT (OUTPATIENT)
Dept: LAB | Age: 44
End: 2024-01-13
Payer: COMMERCIAL

## 2024-01-13 DIAGNOSIS — E11.9 TYPE 2 DIABETES MELLITUS WITHOUT COMPLICATION, WITHOUT LONG-TERM CURRENT USE OF INSULIN (HCC): ICD-10-CM

## 2024-01-13 DIAGNOSIS — I10 ESSENTIAL HYPERTENSION: ICD-10-CM

## 2024-01-13 DIAGNOSIS — R74.01 TRANSAMINITIS: ICD-10-CM

## 2024-01-13 LAB
ALBUMIN SERPL BCP-MCNC: 4.2 G/DL (ref 3.5–5)
ALP SERPL-CCNC: 57 U/L (ref 34–104)
ALT SERPL W P-5'-P-CCNC: 20 U/L (ref 7–52)
ANION GAP SERPL CALCULATED.3IONS-SCNC: 2 MMOL/L
AST SERPL W P-5'-P-CCNC: 17 U/L (ref 13–39)
BILIRUB SERPL-MCNC: 0.88 MG/DL (ref 0.2–1)
BUN SERPL-MCNC: 11 MG/DL (ref 5–25)
CALCIUM SERPL-MCNC: 9.5 MG/DL (ref 8.4–10.2)
CHLORIDE SERPL-SCNC: 101 MMOL/L (ref 96–108)
CO2 SERPL-SCNC: 29 MMOL/L (ref 21–32)
CREAT SERPL-MCNC: 0.65 MG/DL (ref 0.6–1.3)
GFR SERPL CREATININE-BSD FRML MDRD: 117 ML/MIN/1.73SQ M
GLUCOSE P FAST SERPL-MCNC: 289 MG/DL (ref 65–99)
POTASSIUM SERPL-SCNC: 3.9 MMOL/L (ref 3.5–5.3)
PROT SERPL-MCNC: 7.1 G/DL (ref 6.4–8.4)
SODIUM SERPL-SCNC: 132 MMOL/L (ref 135–147)

## 2024-01-13 PROCEDURE — 83036 HEMOGLOBIN GLYCOSYLATED A1C: CPT

## 2024-01-13 PROCEDURE — 80053 COMPREHEN METABOLIC PANEL: CPT

## 2024-01-13 PROCEDURE — 36415 COLL VENOUS BLD VENIPUNCTURE: CPT

## 2024-01-14 LAB
EST. AVERAGE GLUCOSE BLD GHB EST-MCNC: 329 MG/DL
HBA1C MFR BLD: 13.1 %

## 2024-01-18 ENCOUNTER — OFFICE VISIT (OUTPATIENT)
Dept: INTERNAL MEDICINE CLINIC | Age: 44
End: 2024-01-18
Payer: COMMERCIAL

## 2024-01-18 VITALS
WEIGHT: 220 LBS | OXYGEN SATURATION: 99 % | HEART RATE: 76 BPM | TEMPERATURE: 97.8 F | DIASTOLIC BLOOD PRESSURE: 78 MMHG | SYSTOLIC BLOOD PRESSURE: 122 MMHG | BODY MASS INDEX: 32.58 KG/M2 | HEIGHT: 69 IN

## 2024-01-18 DIAGNOSIS — E87.1 HYPONATREMIA: ICD-10-CM

## 2024-01-18 DIAGNOSIS — Z79.899 ON STATIN THERAPY: ICD-10-CM

## 2024-01-18 DIAGNOSIS — Z12.11 SCREENING FOR MALIGNANT NEOPLASM OF COLON: ICD-10-CM

## 2024-01-18 DIAGNOSIS — E78.49 OTHER HYPERLIPIDEMIA: ICD-10-CM

## 2024-01-18 DIAGNOSIS — E11.9 TYPE 2 DIABETES MELLITUS WITHOUT COMPLICATION, WITHOUT LONG-TERM CURRENT USE OF INSULIN (HCC): Primary | ICD-10-CM

## 2024-01-18 DIAGNOSIS — R74.01 TRANSAMINITIS: ICD-10-CM

## 2024-01-18 DIAGNOSIS — I10 ESSENTIAL HYPERTENSION: ICD-10-CM

## 2024-01-18 DIAGNOSIS — R31.29 OTHER MICROSCOPIC HEMATURIA: ICD-10-CM

## 2024-01-18 LAB — SL AMB POCT GLUCOSE BLD: 307

## 2024-01-18 PROCEDURE — 82948 REAGENT STRIP/BLOOD GLUCOSE: CPT | Performed by: INTERNAL MEDICINE

## 2024-01-18 PROCEDURE — 99215 OFFICE O/P EST HI 40 MIN: CPT | Performed by: INTERNAL MEDICINE

## 2024-01-18 RX ORDER — ATORVASTATIN CALCIUM 10 MG/1
10 TABLET, FILM COATED ORAL DAILY
Qty: 30 TABLET | Refills: 1 | Status: SHIPPED | OUTPATIENT
Start: 2024-01-18

## 2024-01-18 RX ORDER — GLIPIZIDE 5 MG/1
5 TABLET ORAL
Qty: 30 TABLET | Refills: 5 | Status: SHIPPED | OUTPATIENT
Start: 2024-01-18

## 2024-01-18 NOTE — PATIENT INSTRUCTIONS
Meal Planning with Diabetes Exchanges   AMBULATORY CARE:   Diabetes exchanges  are servings of food that contain similar amounts of carbohydrate, fat, protein, and calories within a food group. The exchanges can be used to develop a healthy meal plan that helps to keep your blood sugar within the recommended levels. A meal plan with the right amount of carbohydrates is especially important. Your blood sugar naturally rises after you eat carbohydrates. Too many carbohydrates in 1 meal or snack can raise your blood sugar level. Carbohydrates are found in starches, fruit, milk, yogurt, and sweets.  Call your doctor or diabetes care provider if:   You have high blood sugar levels during a certain time of day, or almost all of the time.    You often have low blood sugar levels.    You have questions or concerns about your condition or care.    Create a meal plan with exchanges:  A dietitian will work with you to develop a healthy meal plan that is right for you. This meal plan will include the amount of exchanges you can have from each food group throughout the day. Follow your meal plan by keeping track of the amount of exchanges you eat for each meal and snack. Your meal plan will be based on your age, weight, blood sugar levels, medicine, and activity level.  Starch food group exchanges:  Each exchange below contains about 15 grams of carbohydrate , 3 grams of protein, 1 gram of fat, and 80 calories.  1 ounce of white, whole wheat or rye bread (1 slice)    1 ounce of bagel (about ¼ of a bagel)    1 6-inch flour or corn tortilla or 1 4-inch pancake (about ¼ inch thick)    ? cup of cooked pasta or rice    ¾ cup of dry, ready-to-eat cereal with no sugar added    ½ cup of cooked cereal, such as oatmeal    3 nic cracker squares or 8 animal crackers    6 saltine-type crackers    3 cups of popcorn or ¾ ounce of pretzels    Starchy vegetables and cooked legumes:      ½ cup of corn, green peas, sweet potatoes, or mashed  potatoes    ¼ of a large baked potato    1 cup of acorn, butternut squash, or pumpkin    ½ cup of beans, lentils, or peas (such as mendoza, kidney, or black-eyed)    ? cup of lima beans    Fruit group exchanges:  Each exchange contains about 15 grams of carbohydrate  and 60 calories.  1 small (4 ounce) apple, banana orange, or nectarine    ½ cup of canned or fresh fruit    ½ cup (4 ounces) of unsweetened fruit juice    2 tablespoons of dried fruit    Milk group exchanges:  Each exchange contains about 12 grams of carbohydrate  and 8 grams of protein. The amount of fat and calories in each serving depends on the type of milk (such as whole, low-fat, or fat-free).  1 cup fat-free or low-fat milk    ¾ cup of plain, nonfat yogurt    1 cup fat-free, flavored yogurt with artificial (no calorie) sweetener    Non-starchy vegetable group exchanges:  Each exchange contains about 5 grams of carbohydrate , 2 grams of protein, and 25 calories. Examples include beets, broccoli, cabbage, carrots, cauliflower, cucumber, mushrooms, tomatoes, and zucchini.  ½ cup of cooked vegetables or 1 cup of raw vegetables    ½ cup of vegetable juice    Meat and meat substitute group exchanges:  Each exchange of a lean meat  listed below contains about 7 grams of protein, 0 to 3 grams of fat, and 45 calories. The meat and meat substitutes food group does not contain any carbohydrates. Medium and high-fat meats have more calories.  1 ounce of chicken or turkey without skin, or 1 ounce of fish (not breaded or fried)    1 ounce of lean beef, pork, or lamb    1-inch cube or 1 ounce of low-fat cheese    2 egg whites or ¼ cup of egg substitute    ½ cup of tofu    Sweets, desserts, and other carbohydrate group exchanges:   Sweets and other desserts:  Each exchange has about 15 grams of carbohydrate .    1 ounce of esvin food cake or 2-inch square cake (unfrosted)    2 small cookies    ½ cup of sugar-free, fat-free ice cream    1 tablespoon of syrup, jam,  jelly, table sugar, or honey    Combination foods:     1 cup of an entrée, such as lasagna, spaghetti with meatballs, macaroni and cheese, and chili with beans (each serving counts as 2 carbohydrate exchanges )    1 cup of tomato or vegetable beef soup (each serving counts as 1 carbohydrate exchange )    Fat group exchanges:  Each exchange contains 5 grams of fat and 45 calories.  1 teaspoon of oil (such as canola, olive, or corn oil)    6 almonds or cashews, 10 peanuts, or 4 pecan halves    2 tablespoons of avocado    ½ tablespoon of peanut butter    1 teaspoon of regular margarine or 2 teaspoons of low-fat margarine    1 teaspoon of regular butter or 1 tablespoon of low-fat butter    1 teaspoon of regular mayonnaise or 1 tablespoon of low-fat mayonnaise    1 tablespoon of regular salad dressing or 2 tablespoons of low-fat salad dressing    Free foods:  The foods on this list are called free foods because they have very few calories. Free foods usually do not increase your blood sugar if you limit them.  1 tablespoon of catsup or taco sauce    ¼ cup of salsa    2 tablespoons of sugar-free syrup or 2 teaspoons of light jam or jelly    1 tablespoon of fat-free salad dressing    4 tablespoons of fat-free margarine or fat-free mayonnaise    Sugar-free drinks: diet soda, sugar-free drink mixes, or mineral water    Low-sodium bouillon or fat-free broth    Mustard    Seasonings such as spices, herbs, and garlic    Sugar-free gelatin without added fruit    Other healthy nutrition guidelines:   Limit drinks with sugar substitutes.  Your dietitian or healthcare provider will encourage you to drink water. Water helps your kidneys to function properly. Ask how much water you should drink every day.    Eat more fiber.  Choose foods that are good sources of fiber, such as fruits, vegetables, and whole grains. Cereals that contain 5 or more grams of fiber per serving are good sources of fiber. Legumes such as garbanzo, mendoza  beans, kidney beans, and lentils are also good sources.         Limit fat.  Ask your dietitian or healthcare provider how much fat you should eat each day. Choose foods low in fat, saturated fat, trans fat, and cholesterol. Examples include turkey or chicken without the skin, fish, lean cuts of meat, and beans. Low-fat dairy foods, such as low-fat or fat-free milk and low-fat yogurt are also good choices. Omega-3 fatty acids are healthy fats that are found in canola oil, soybean oil and fatty fish. [place], albacore tuna, and sardines are good sources of omega 3 fatty acids. Eat 2 servings of these types of fish each week. Do not eat fried fish.         Limit sugar.  Sugar and sweets must be counted toward the carbohydrate exchanges that you can have within your meal plan. Limit sugar and sweets because they are usually also high in calories and fat. Eat smaller portions of sweets by sharing a dessert or asking for a child-size portion at a restaurant.    Limit sodium  (salt) to about 2,300 mg per day. You may need to eat even less sodium if you have certain medical conditions. Foods high in sodium include soy sauce, potato chips, and soup.         Limit alcohol.  Ask your healthcare provider if it is safe for you to drink alcohol. If alcohol is safe for you to have, eat a meal when you drink alcohol. If you drink alcohol on an empty stomach, your blood sugar may drop to a low level. Women 21 years or older and men 65 years or older should limit alcohol to 1 drink a day. Men aged 21 to 64 years should limit alcohol to 2 drinks a day. A drink of alcohol is 5 ounces of wine, 12 ounces of beer, or 1½ ounces of liquor.    Other ways to manage diabetes:   Control your blood sugar level.  Test your blood sugar level regularly and keep a record of the results. Ask your healthcare provider when and how often to test your blood sugar. You may need to check your blood sugar level at least 3 times each day.    Talk to your  healthcare provider about your weight.  Ask if you need to lose weight, and how much you need to lose. If you are overweight, you may need to make other changes to lose weight. Ask your healthcare provider to help you create a weight loss program.    Get regular physical activity.  Physical activity can help decrease your blood sugar level. It can also help to decrease your risk for heart disease and help you lose weight. Adults should have moderate intensity physical activity for at least 150 minutes every week. Spread the amount of activity over at least 3 days a week. Do not skip more than 2 days in a row. Children should get at least 60 minutes of moderate physical activity on most days of the week. Examples of moderate physical activity include brisk walking, running, and swimming. Do not sit for longer than 30 minutes. Work with your healthcare provider to create a plan for physical activity.       © Copyright [employer] 2023 Information is for End User's use only and may not be sold, redistributed or otherwise used for commercial purposes.  The above information is an  only. It is not intended as medical advice for individual conditions or treatments. Talk to your doctor, nurse or pharmacist before following any medical regimen to see if it is safe and effective for you.

## 2024-01-18 NOTE — PROGRESS NOTES
Diabetic Foot Exam    Patient's shoes and socks removed.    Right Foot/Ankle   Right Foot Inspection  Skin Exam: skin normal and skin intact. No dry skin, no warmth, no callus, no erythema, no maceration, no abnormal color, no pre-ulcer, no ulcer and no callus.     Toe Exam: ROM and strength within normal limits. No swelling, no tenderness, erythema and  no right toe deformity    Sensory   Monofilament testing: intact    Vascular  The right DP pulse is 2+. The right PT pulse is 2+.     Left Foot/Ankle  Left Foot Inspection  Skin Exam: skin normal, skin intact and callus. No dry skin, no warmth, no erythema, no maceration, normal color, no pre-ulcer and no ulcer.     Toe Exam: ROM and strength within normal limits. No swelling, no tenderness, no erythema and no left toe deformity.     Sensory   Proprioception: intact  Monofilament testing: intact    Vascular  The left DP pulse is 2+. The left PT pulse is 2+.     Assign Risk Category  No deformity present  No loss of protective sensation  No weak pulses  Risk: 0

## 2024-01-18 NOTE — PROGRESS NOTES
Assessment/Plan:    Diabetes mellitus type 2  - blood glucose is very poorly controlled , last hemoglobin A1c was 13.1, up from 6.9 on 6/30/2023  -continue with metformin and we will start patient on Januvia and glipizide  -Will also start patient on atorvastatin  -We discussed insulin but he wants to hold off on taking insulin at this time.  -I will refer him once more to diabetic education classes and he was counseled to go for the class as this will help him control his diabetes better.  -continue with a diabetic diet low in carbohydrates and simple sugars  -continue with exercise  - diabetic foot exam was done today  -Diabetic eye exam is still pending but will refer him to ophthalmology  -We will albumin/creatinine ratio in urine  -Follow-up in 3 months.    Essential hypertension  - blood pressure is well controlled   -continue with losartan-hydrochlorothiazide and amlodipine  -continue with a low-salt diet and with exercise    Microscopic hematuria  -Stable without gross hematuria  -Follow-up with urology    Transaminitis  -Resolved, recent CMP showed normal LFTs.    Hyperlipidemia  -Lipids are well controlled  - last lipid panel done on 3/10/2023, showed a total cholesterol of 179, down from 208, 1/17/22,  A triglyceride of 101, up from 93 and HDL of 45, down from 51 and an LDL of 114, down from 138  -continue with a heart healthy diet  -Will start patient on atorvastatin and check an LFT in 2 weeks    Colon cancer screening  -Patient is currently 43 years old  -Will refer him to gastroenterology for colonoscopy    Hyponatremia  -Artifactual  -Sodium is 132 and glucose is 289 therefore corrected sodium is normal at 137     Diagnoses and all orders for this visit:    Type 2 diabetes mellitus without complication, without long-term current use of insulin ([facility])  -     Ambulatory Referral to Ophthalmology; Future  -     POCT blood glucose  -     sitaGLIPtin (JANUVIA) 100 mg tablet; Take 1 tablet (100 mg total) by  mouth daily  -     glipiZIDE (GLUCOTROL) 5 mg tablet; Take 1 tablet (5 mg total) by mouth daily with breakfast  -     atorvastatin (LIPITOR) 10 mg tablet; Take 1 tablet (10 mg total) by mouth daily  -     Ambulatory Referral to Diabetic Education; Future  -     Albumin / creatinine urine ratio; Future    Essential hypertension    Other microscopic hematuria    Other hyperlipidemia  -     atorvastatin (LIPITOR) 10 mg tablet; Take 1 tablet (10 mg total) by mouth daily    Screening for malignant neoplasm of colon  -     Ambulatory Referral to Gastroenterology; Future    Transaminitis    On statin therapy  -     Hepatic function panel; Future    Hyponatremia             Subjective:      Patient ID: Russell Campbell is a 43 y.o. male.    HPI    Patient presents for a follow-up visit regarding his diabetes mellitus type 2, hyperlipidemia, transaminitis, essential hypertension, microscopic hematuria.  He states that he  has been taking his medications as prescribed.  Today pt had blood work done and wants to review the results.  Hemoglobin A1c cori from 6.9 to 13.1.  Has not been checking his bg at home  Admits to polyuria sometimes, polydipsia, polyphagia, occasional blurry vision and weight loss.  Has been having issues with near vision but far vision is better.   States that diet has not changed.   Eats a lot of corn, rice and mashed potatoes, potato chips, pretzels, doughnuts  Has been eating a lot of candy, beef jerky and beef sticks  Did not go for the diabteic education classes - they tried to call him but he did not go.  Has not gone for the eye appt with an ophthalmologist yet.  He denies any fever, chills, night sweats, headache, dizziness, nasal congestion, runny nose, pnd, sore throat, ear ache, sinus pain or pressure, wheezing, cough, chest pain, sob, palpitations, nausea, vomiting, diarrhea, constipation, hematochezia, hematuria, melena stools, arthralgias, myalgias, feelings of anxiety, depression or  insomnia.  He has never had a colonoscopy.      The following portions of the patient's history were reviewed and updated as appropriate: He  has a past medical history of Hypertension (12/2021).  He   Patient Active Problem List    Diagnosis Date Noted   • Hyponatremia 01/18/2024   • On statin therapy 01/18/2024   • Type 2 diabetes mellitus without complication, without long-term current use of insulin ([facility])    • BMI 40.0-44.9, adult ([facility]) 03/17/2023   • Leucocytosis 09/28/2022   • Skin tag of perianal region 07/27/2022   • Other hyperlipidemia 04/22/2022   • Other microscopic hematuria 04/22/2022   • 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   • Other proteinuria 02/03/2022   • Transaminitis 02/03/2022   • Prediabetes 02/03/2022   • Elevated serum protein level 02/03/2022   • Essential hypertension 12/10/2021   • Annual physical exam 12/10/2021   • Closed low lateral malleolus fracture, left, with routine healing, subsequent encounter 03/19/2021     He  has a past surgical history that includes No past surgeries; pr anrct xm surg req anes general spi/edrl dx (N/A, 10/12/2022); and pr surg tx anal fistula subq (N/A, 10/12/2022).  His family history includes Cancer in his father; Diabetes in his father; Hypertension in his father and mother.  He  reports that he has never smoked. He has never used smokeless tobacco. He reports current alcohol use. He reports that he does not use drugs.  Current Outpatient Medications   Medication Sig Dispense Refill   • amLODIPine (NORVASC) 10 mg tablet Take 1 tablet (10 mg total) by mouth daily 90 tablet 1   • atorvastatin (LIPITOR) 10 mg tablet Take 1 tablet (10 mg total) by mouth daily 30 tablet 1   • Blood Glucose Monitoring Suppl (OneTouch Verio Reflect) w/Device KIT Check blood sugars once daily. Please substitute with appropriate alternative as covered by patient's insurance. Dx: E11.65 1 kit 0   • glipiZIDE (GLUCOTROL) 5 mg tablet Take 1 tablet (5 mg total) by mouth daily with breakfast 30  tablet 5   • glucose blood (OneTouch Verio) test strip Check blood sugars once daily. Please substitute with appropriate alternative as covered by patient's insurance. Dx: E11.65 100 each 3   • losartan-hydrochlorothiazide (HYZAAR) 100-25 MG per tablet Take 1 tablet by mouth daily 90 tablet 1   • metFORMIN (GLUCOPHAGE-XR) 500 mg 24 hr tablet Take 1 tablet (500 mg total) by mouth daily with breakfast 90 tablet 1   • OneTouch Delica Lancets 33G MISC Check blood sugars once daily. Please substitute with appropriate alternative as covered by patient's insurance. Dx: E11.65 100 each 3   • sitaGLIPtin (JANUVIA) 100 mg tablet Take 1 tablet (100 mg total) by mouth daily 30 tablet 5     No current facility-administered medications for this visit.     Current Outpatient Medications on File Prior to Visit   Medication Sig   • amLODIPine (NORVASC) 10 mg tablet Take 1 tablet (10 mg total) by mouth daily   • Blood Glucose Monitoring Suppl (OneTouch Verio Reflect) w/Device KIT Check blood sugars once daily. Please substitute with appropriate alternative as covered by patient's insurance. Dx: E11.65   • glucose blood (OneTouch Verio) test strip Check blood sugars once daily. Please substitute with appropriate alternative as covered by patient's insurance. Dx: E11.65   • losartan-hydrochlorothiazide (HYZAAR) 100-25 MG per tablet Take 1 tablet by mouth daily   • metFORMIN (GLUCOPHAGE-XR) 500 mg 24 hr tablet Take 1 tablet (500 mg total) by mouth daily with breakfast   • OneTouch Delica Lancets 33G MISC Check blood sugars once daily. Please substitute with appropriate alternative as covered by patient's insurance. Dx: E11.65     No current facility-administered medications on file prior to visit.     He has No Known Allergies..    Review of Systems   Constitutional:  Positive for unexpected weight change (wt loss of 60 lbs in 10 months). Negative for activity change, chills, fatigue and fever.   HENT:  Negative for ear pain, postnasal  "drip, rhinorrhea, sinus pressure and sore throat.    Eyes:  Positive for visual disturbance. Negative for pain.   Respiratory:  Negative for cough, choking, chest tightness, shortness of breath and wheezing.    Cardiovascular:  Negative for chest pain, palpitations and leg swelling.   Gastrointestinal:  Negative for abdominal pain, constipation, diarrhea, nausea and vomiting.   Endocrine: Positive for polydipsia, polyphagia and polyuria.   Genitourinary:  Negative for dysuria and hematuria.   Musculoskeletal:  Negative for arthralgias, back pain, gait problem, joint swelling, myalgias and neck stiffness.   Skin:  Negative for pallor and rash.   Neurological:  Negative for dizziness, tremors, seizures, syncope, light-headedness and headaches.   Hematological:  Negative for adenopathy.   Psychiatric/Behavioral:  Negative for behavioral problems.          Objective:      /78 (BP Location: Left arm, Patient Position: Sitting, Cuff Size: Large)   Pulse 76   Temp 97.8 °F (36.6 °C) (Temporal)   Ht 5' 9\" (1.753 m)   Wt 99.8 kg (220 lb)   SpO2 99% Comment: room air  BMI 32.49 kg/m²          Physical Exam  Constitutional:       General: He is not in acute distress.     Appearance: He is well-developed. He is not diaphoretic.   HENT:      Head: Normocephalic and atraumatic.      Right Ear: External ear normal.      Left Ear: External ear normal.      Nose: Nose normal.      Mouth/Throat:      Mouth: Mucous membranes are dry.      Pharynx: Posterior oropharyngeal erythema present. No oropharyngeal exudate.   Eyes:      General: No scleral icterus.        Right eye: No discharge.         Left eye: No discharge.      Conjunctiva/sclera: Conjunctivae normal.      Pupils: Pupils are equal, round, and reactive to light.   Neck:      Thyroid: No thyromegaly.      Vascular: No JVD.      Trachea: No tracheal deviation.   Cardiovascular:      Rate and Rhythm: Normal rate and regular rhythm.      Heart sounds: Normal heart " sounds. No murmur heard.     No friction rub. No gallop.   Pulmonary:      Effort: Pulmonary effort is normal. No respiratory distress.      Breath sounds: Normal breath sounds. No wheezing or rales.   Chest:      Chest wall: No tenderness.   Abdominal:      General: Bowel sounds are normal. There is no distension.      Palpations: Abdomen is soft. There is no mass.      Tenderness: There is no abdominal tenderness. There is no guarding or rebound.   Musculoskeletal:         General: No tenderness. Normal range of motion.      Cervical back: Normal range of motion and neck supple.      Thoracic back: Deformity (kyphosis) present.   Lymphadenopathy:      Cervical: No cervical adenopathy.   Skin:     General: Skin is warm and dry.      Coloration: Skin is not pale.      Findings: Lesion (Skin lesion on top of the left forefoot and toes) present. No erythema, signs of injury or rash.   Neurological:      Mental Status: He is alert and oriented to person, place, and time.      Cranial Nerves: No cranial nerve deficit.      Motor: No abnormal muscle tone.      Coordination: Coordination normal.      Deep Tendon Reflexes: Reflexes are normal and symmetric.   Psychiatric:         Behavior: Behavior normal.           Office Visit on 01/18/2024   Component Date Value Ref Range Status   • GLUCOSE BLD 01/18/2024 307   Final   Appointment on 01/13/2024   Component Date Value Ref Range Status   • Sodium 01/13/2024 132 (L)  135 - 147 mmol/L Final   • Potassium 01/13/2024 3.9  3.5 - 5.3 mmol/L Final   • Chloride 01/13/2024 101  96 - 108 mmol/L Final   • CO2 01/13/2024 29  21 - 32 mmol/L Final   • ANION GAP 01/13/2024 2  mmol/L Final   • BUN 01/13/2024 11  5 - 25 mg/dL Final   • Creatinine 01/13/2024 0.65  0.60 - 1.30 mg/dL Final    Standardized to IDMS reference method   • Glucose, Fasting 01/13/2024 289 (H)  65 - 99 mg/dL Final   • Calcium 01/13/2024 9.5  8.4 - 10.2 mg/dL Final   • AST 01/13/2024 17  13 - 39 U/L Final   • ALT  01/13/2024 20  7 - 52 U/L Final    Specimen collection should occur prior to Sulfasalazine administration due to the potential for falsely depressed results.    • Alkaline Phosphatase 01/13/2024 57  34 - 104 U/L Final   • Total Protein 01/13/2024 7.1  6.4 - 8.4 g/dL Final   • Albumin 01/13/2024 4.2  3.5 - 5.0 g/dL Final   • Total Bilirubin 01/13/2024 0.88  0.20 - 1.00 mg/dL Final    Use of this assay is not recommended for patients undergoing treatment with eltrombopag due to the potential for falsely elevated results.  N-acetyl-p-benzoquinone imine (metabolite of Acetaminophen) will generate erroneously low results in samples for patients that have taken an overdose of Acetaminophen.   • eGFR 01/13/2024 117  ml/min/1.73sq m Final   • Hemoglobin A1C 01/13/2024 13.1 (H)  Normal 4.0-5.6%; PreDiabetic 5.7-6.4%; Diabetic >=6.5%; Glycemic control for adults with diabetes <7.0% % Final   • EAG 01/13/2024 329  mg/dl Final   Office Visit on 10/06/2023   Component Date Value Ref Range Status   • LEUKOCYTE ESTERASE,UA 10/06/2023 -   Final   • NITRITE,UA 10/06/2023 -   Final   • SL AMB POCT UROBILINOGEN 10/06/2023 0.2 E.U./dL   Final   • POCT URINE PROTEIN 10/06/2023 -   Final   •  PH,UA 10/06/2023 5.5   Final   • BLOOD,UA 10/06/2023 TRACE-LYSED   Final   • SPECIFIC GRAVITY,UA 10/06/2023 1.020   Final   • KETONES,UA 10/06/2023 40mg/dL   Final   • BILIRUBIN,UA 10/06/2023 SMALL   Final   • GLUCOSE, UA 10/06/2023 1000 mg/dL   Final   •  COLOR,UA 10/06/2023 YELLOW   Final   • CLARITY,UA 10/06/2023 CLEAR   Final   Appointment on 06/30/2023   Component Date Value Ref Range Status   • Hemoglobin A1C 06/30/2023 6.9 (H)  Normal 3.8-5.6%; PreDiabetic 5.7-6.4%; Diabetic >=6.5%; Glycemic control for adults with diabetes <7.0% % Final   • EAG 06/30/2023 151  mg/dl Final   • Sodium 06/30/2023 138  135 - 147 mmol/L Final   • Potassium 06/30/2023 4.0  3.5 - 5.3 mmol/L Final   • Chloride 06/30/2023 108  96 - 108 mmol/L Final   • CO2 06/30/2023  26  21 - 32 mmol/L Final   • ANION GAP 06/30/2023 4  mmol/L Final   • BUN 06/30/2023 14  5 - 25 mg/dL Final   • Creatinine 06/30/2023 0.83  0.60 - 1.30 mg/dL Final    Standardized to IDMS reference method   • Glucose, Fasting 06/30/2023 159 (H)  65 - 99 mg/dL Final    Specimen collection should occur prior to Sulfasalazine administration due to the potential for falsely depressed results. Specimen collection should occur prior to Sulfapyridine administration due to the potential for falsely elevated results.   • Calcium 06/30/2023 9.3  8.3 - 10.1 mg/dL Final   • AST 06/30/2023 77 (H)  5 - 45 U/L Final    Specimen collection should occur prior to Sulfasalazine administration due to the potential for falsely depressed results.    • ALT 06/30/2023 127 (H)  12 - 78 U/L Final    Specimen collection should occur prior to Sulfasalazine and/or Sulfapyridine administration due to the potential for falsely depressed results.    • Alkaline Phosphatase 06/30/2023 79  46 - 116 U/L Final   • Total Protein 06/30/2023 8.2  6.4 - 8.4 g/dL Final   • Albumin 06/30/2023 3.8  3.5 - 5.0 g/dL Final   • Total Bilirubin 06/30/2023 0.68  0.20 - 1.00 mg/dL Final    Use of this assay is not recommended for patients undergoing treatment with eltrombopag due to the potential for falsely elevated results.   • eGFR 06/30/2023 107  ml/min/1.73sq m Final   Appointment on 03/25/2023   Component Date Value Ref Range Status   • Hemoglobin A1C 03/25/2023 6.5 (H)  Normal 3.8-5.6%; PreDiabetic 5.7-6.4%; Diabetic >=6.5%; Glycemic control for adults with diabetes <7.0% % Final   • EAG 03/25/2023 140  mg/dl Final   Appointment on 03/10/2023   Component Date Value Ref Range Status   • WBC 03/10/2023 10.50 (H)  4.31 - 10.16 Thousand/uL Final   • RBC 03/10/2023 4.59  3.88 - 5.62 Million/uL Final   • Hemoglobin 03/10/2023 14.3  12.0 - 17.0 g/dL Final   • Hematocrit 03/10/2023 42.5  36.5 - 49.3 % Final   • MCV 03/10/2023 93  82 - 98 fL Final   • MCH 03/10/2023  31.2  26.8 - 34.3 pg Final   • MCHC 03/10/2023 33.6  31.4 - 37.4 g/dL Final   • RDW 03/10/2023 13.4  11.6 - 15.1 % Final   • MPV 03/10/2023 10.2  8.9 - 12.7 fL Final   • Platelets 03/10/2023 316  149 - 390 Thousands/uL Final   • nRBC 03/10/2023 0  /100 WBCs Final   • Neutrophils Relative 03/10/2023 64  43 - 75 % Final   • Immat GRANS % 03/10/2023 2  0 - 2 % Final   • Lymphocytes Relative 03/10/2023 22  14 - 44 % Final   • Monocytes Relative 03/10/2023 9  4 - 12 % Final   • Eosinophils Relative 03/10/2023 2  0 - 6 % Final   • Basophils Relative 03/10/2023 1  0 - 1 % Final   • Neutrophils Absolute 03/10/2023 6.79  1.85 - 7.62 Thousands/µL Final   • Immature Grans Absolute 03/10/2023 0.17  0.00 - 0.20 Thousand/uL Final   • Lymphocytes Absolute 03/10/2023 2.33  0.60 - 4.47 Thousands/µL Final   • Monocytes Absolute 03/10/2023 0.89  0.17 - 1.22 Thousand/µL Final   • Eosinophils Absolute 03/10/2023 0.24  0.00 - 0.61 Thousand/µL Final   • Basophils Absolute 03/10/2023 0.08  0.00 - 0.10 Thousands/µL Final   • TSH 3RD GENERATON 03/10/2023 2.170  0.450 - 4.500 uIU/mL Final    Adult TSH (3rd generation) reference range follows the recommended guidelines of the American Thyroid Association, January, 2020.   • Sodium 03/10/2023 138  135 - 147 mmol/L Final   • Potassium 03/10/2023 3.9  3.5 - 5.3 mmol/L Final   • Chloride 03/10/2023 104  96 - 108 mmol/L Final   • CO2 03/10/2023 25  21 - 32 mmol/L Final   • ANION GAP 03/10/2023 9  4 - 13 mmol/L Final   • BUN 03/10/2023 15  5 - 25 mg/dL Final   • Creatinine 03/10/2023 0.72  0.60 - 1.30 mg/dL Final    Standardized to IDMS reference method   • Glucose, Fasting 03/10/2023 131 (H)  65 - 99 mg/dL Final    Specimen collection should occur prior to Sulfasalazine administration due to the potential for falsely depressed results. Specimen collection should occur prior to Sulfapyridine administration due to the potential for falsely elevated results.   • Calcium 03/10/2023 9.4  8.3 - 10.1  mg/dL Final   • AST 03/10/2023 41  5 - 45 U/L Final    Specimen collection should occur prior to Sulfasalazine administration due to the potential for falsely depressed results.    • ALT 03/10/2023 85 (H)  12 - 78 U/L Final    Specimen collection should occur prior to Sulfasalazine and/or Sulfapyridine administration due to the potential for falsely depressed results.    • Alkaline Phosphatase 03/10/2023 70  46 - 116 U/L Final   • Total Protein 03/10/2023 7.7  6.4 - 8.4 g/dL Final   • Albumin 03/10/2023 3.7  3.5 - 5.0 g/dL Final   • Total Bilirubin 03/10/2023 0.63  0.20 - 1.00 mg/dL Final    Use of this assay is not recommended for patients undergoing treatment with eltrombopag due to the potential for falsely elevated results.   • eGFR 03/10/2023 113  ml/min/1.73sq m Final   • Cholesterol 03/10/2023 179  See Comment mg/dL Final    Cholesterol:         Pediatric <18 Years        Desirable          <170 mg/dL      Borderline High    170-199 mg/dL      High               >=200 mg/dL        Adult >=18 Years            Desirable         <200 mg/dL      Borderline High   200-239 mg/dL      High              >239 mg/dL     • Triglycerides 03/10/2023 101  See Comment mg/dL Final    Triglyceride:     0-9Y            <75mg/dL     10Y-17Y         <90 mg/dL       >=18Y     Normal          <150 mg/dL     Borderline High 150-199 mg/dL     High            200-499 mg/dL        Very High       >499 mg/dL    Specimen collection should occur prior to N-Acetylcysteine or Metamizole administration due to the potential for falsely depressed results.   • HDL, Direct 03/10/2023 45  >=40 mg/dL Final    Specimen collection should occur prior to Metamizole administration due to the potential for falsley depressed results.   • LDL Calculated 03/10/2023 114 (H)  0 - 100 mg/dL Final    LDL Cholesterol:     Optimal           <100 mg/dl     Near Optimal      100-129 mg/dl     Above Optimal       Borderline High 130-159 mg/dl       High             160-189 mg/dl       Very High       >189 mg/dl         This screening LDL is a calculated result.   It does not have the accuracy of the Direct Measured LDL in the monitoring of patients with hyperlipidemia and/or statin therapy.   Direct Measure LDL ([id number]) must be ordered separately in these patients.   • Non-HDL-Chol (CHOL-HDL) 03/10/2023 134  mg/dl Final   • Color, UA 03/10/2023 Light Yellow   Final   • Clarity, UA 03/10/2023 Clear   Final   • Specific Gravity, UA 03/10/2023 1.022  1.003 - 1.030 Final   • pH, UA 03/10/2023 6.5  4.5, 5.0, 5.5, 6.0, 6.5, 7.0, 7.5, 8.0 Final   • Leukocytes, UA 03/10/2023 Negative  Negative Final   • Nitrite, UA 03/10/2023 Negative  Negative Final   • Protein, UA 03/10/2023 Trace (A)  Negative mg/dl Final   • Glucose, UA 03/10/2023 Negative  Negative mg/dl Final   • Ketones, UA 03/10/2023 Negative  Negative mg/dl Final   • Urobilinogen, UA 03/10/2023 <2.0  <2.0 mg/dl mg/dl Final   • Bilirubin, UA 03/10/2023 Negative  Negative Final   • Occult Blood, UA 03/10/2023 Small (A)  Negative Final   • RBC, UA 03/10/2023 2-4 (A)  None Seen, 1-2 /hpf Final   • WBC, UA 03/10/2023 1-2  None Seen, 1-2 /hpf Final   • Epithelial Cells 03/10/2023 None Seen  None Seen, Occasional /hpf Final   • Bacteria, UA 03/10/2023 None Seen  None Seen, Occasional /hpf Final   • MUCUS THREADS 03/10/2023 Occasional (A)  None Seen Final

## 2024-01-25 ENCOUNTER — TELEPHONE (OUTPATIENT)
Age: 44
End: 2024-01-25

## 2024-01-25 ENCOUNTER — PREP FOR PROCEDURE (OUTPATIENT)
Age: 44
End: 2024-01-25

## 2024-01-25 DIAGNOSIS — Z12.11 SCREENING FOR COLON CANCER: Primary | ICD-10-CM

## 2024-01-25 NOTE — TELEPHONE ENCOUNTER
Scheduled date of colonoscopy (as of today): 2/1/24  Physician performing colonoscopy: Geovanna  Location of colonoscopy: [facility]  Bowel prep reviewed with patient: foster/celine  Instructions reviewed with patient by: email to [email or web address]   Clearances: n/a

## 2024-02-01 ENCOUNTER — ANESTHESIA (OUTPATIENT)
Dept: GASTROENTEROLOGY | Facility: HOSPITAL | Age: 44
End: 2024-02-01

## 2024-02-01 ENCOUNTER — HOSPITAL ENCOUNTER (OUTPATIENT)
Dept: GASTROENTEROLOGY | Facility: HOSPITAL | Age: 44
Setting detail: OUTPATIENT SURGERY
End: 2024-02-01
Attending: INTERNAL MEDICINE
Payer: COMMERCIAL

## 2024-02-01 ENCOUNTER — ANESTHESIA EVENT (OUTPATIENT)
Dept: GASTROENTEROLOGY | Facility: HOSPITAL | Age: 44
End: 2024-02-01

## 2024-02-01 VITALS
RESPIRATION RATE: 19 BRPM | OXYGEN SATURATION: 98 % | SYSTOLIC BLOOD PRESSURE: 122 MMHG | HEART RATE: 65 BPM | DIASTOLIC BLOOD PRESSURE: 76 MMHG | TEMPERATURE: 97.4 F

## 2024-02-01 DIAGNOSIS — Z12.11 SCREENING FOR COLON CANCER: ICD-10-CM

## 2024-02-01 PROBLEM — E66.9 OBESITY: Status: ACTIVE | Noted: 2024-02-01

## 2024-02-01 LAB — GLUCOSE SERPL-MCNC: 158 MG/DL (ref 65–140)

## 2024-02-01 PROCEDURE — 45385 COLONOSCOPY W/LESION REMOVAL: CPT | Performed by: INTERNAL MEDICINE

## 2024-02-01 PROCEDURE — 82948 REAGENT STRIP/BLOOD GLUCOSE: CPT

## 2024-02-01 PROCEDURE — 88305 TISSUE EXAM BY PATHOLOGIST: CPT | Performed by: PATHOLOGY

## 2024-02-01 RX ORDER — SODIUM CHLORIDE, SODIUM LACTATE, POTASSIUM CHLORIDE, CALCIUM CHLORIDE 600; 310; 30; 20 MG/100ML; MG/100ML; MG/100ML; MG/100ML
20 INJECTION, SOLUTION INTRAVENOUS CONTINUOUS
Status: DISCONTINUED | OUTPATIENT
Start: 2024-02-01 | End: 2024-02-05 | Stop reason: HOSPADM

## 2024-02-01 RX ORDER — SODIUM CHLORIDE, SODIUM LACTATE, POTASSIUM CHLORIDE, CALCIUM CHLORIDE 600; 310; 30; 20 MG/100ML; MG/100ML; MG/100ML; MG/100ML
125 INJECTION, SOLUTION INTRAVENOUS CONTINUOUS
Status: DISCONTINUED | OUTPATIENT
Start: 2024-02-01 | End: 2024-02-05 | Stop reason: HOSPADM

## 2024-02-01 RX ORDER — ONDANSETRON 2 MG/ML
4 INJECTION INTRAMUSCULAR; INTRAVENOUS ONCE AS NEEDED
Status: DISCONTINUED | OUTPATIENT
Start: 2024-02-01 | End: 2024-02-05 | Stop reason: HOSPADM

## 2024-02-01 RX ORDER — PROPOFOL 10 MG/ML
INJECTION, EMULSION INTRAVENOUS AS NEEDED
Status: DISCONTINUED | OUTPATIENT
Start: 2024-02-01 | End: 2024-02-01

## 2024-02-01 RX ORDER — LIDOCAINE HYDROCHLORIDE 20 MG/ML
INJECTION, SOLUTION EPIDURAL; INFILTRATION; INTRACAUDAL; PERINEURAL AS NEEDED
Status: DISCONTINUED | OUTPATIENT
Start: 2024-02-01 | End: 2024-02-01

## 2024-02-01 RX ADMIN — PROPOFOL 150 MG: 10 INJECTION, EMULSION INTRAVENOUS at 07:32

## 2024-02-01 RX ADMIN — PROPOFOL 50 MG: 10 INJECTION, EMULSION INTRAVENOUS at 07:33

## 2024-02-01 RX ADMIN — PROPOFOL 30 MG: 10 INJECTION, EMULSION INTRAVENOUS at 07:39

## 2024-02-01 RX ADMIN — PROPOFOL 30 MG: 10 INJECTION, EMULSION INTRAVENOUS at 07:41

## 2024-02-01 RX ADMIN — LIDOCAINE HYDROCHLORIDE 100 MG: 20 INJECTION, SOLUTION EPIDURAL; INFILTRATION; INTRACAUDAL; PERINEURAL at 07:32

## 2024-02-01 RX ADMIN — PROPOFOL 50 MG: 10 INJECTION, EMULSION INTRAVENOUS at 07:36

## 2024-02-01 RX ADMIN — SODIUM CHLORIDE, SODIUM LACTATE, POTASSIUM CHLORIDE, AND CALCIUM CHLORIDE: .6; .31; .03; .02 INJECTION, SOLUTION INTRAVENOUS at 07:24

## 2024-02-01 RX ADMIN — PROPOFOL 30 MG: 10 INJECTION, EMULSION INTRAVENOUS at 07:43

## 2024-02-01 NOTE — ANESTHESIA PROCEDURE NOTES
Anesthesia Notable Event    Date/Time: 2/1/2024 7:47 AM    Performed by: Reena Bhatia CRNA  Authorized by: Aide Machado MD        
declines

## 2024-02-01 NOTE — ANESTHESIA PREPROCEDURE EVALUATION
Procedure:  COLONOSCOPY    Relevant Problems   CARDIO   (+) Essential hypertension   (+) Other hyperlipidemia      ENDO   (+) Type 2 diabetes mellitus without complication, without long-term current use of insulin (HCC)      Other   (+) Obesity        Physical Exam    Airway    Mallampati score: III  TM Distance: >3 FB  Neck ROM: full     Dental       Cardiovascular      Pulmonary      Other Findings        Anesthesia Plan  ASA Score- 3     Anesthesia Type- IV sedation with anesthesia with ASA Monitors.         Additional Monitors:     Airway Plan:            Plan Factors-    Chart reviewed.   Existing labs reviewed. Patient summary reviewed.                  Induction- intravenous.    Postoperative Plan-     Informed Consent- Anesthetic plan and risks discussed with patient.  I personally reviewed this patient with the CRNA. Discussed and agreed on the Anesthesia Plan with the CRNA..

## 2024-02-01 NOTE — ANESTHESIA POSTPROCEDURE EVALUATION
Post-Op Assessment Note    CV Status:  Stable  Pain Score: 0    Pain management: adequate       Mental Status:  Awake and alert   Hydration Status:  Stable   PONV Controlled:  None   Airway Patency:  Patent and adequate     Post Op Vitals Reviewed: Yes    No anethesia notable event occurred.    Staff: CRNA, Anesthesiologist               BP   102/59   Temp   98   Pulse  63   Resp   16   SpO2   98%

## 2024-02-02 ENCOUNTER — NEW PATIENT (OUTPATIENT)
Dept: URBAN - METROPOLITAN AREA CLINIC 6 | Facility: CLINIC | Age: 44
End: 2024-02-02

## 2024-02-02 DIAGNOSIS — H52.203: ICD-10-CM

## 2024-02-02 DIAGNOSIS — H52.13: ICD-10-CM

## 2024-02-02 DIAGNOSIS — E11.9: ICD-10-CM

## 2024-02-02 LAB
LEFT EYE DIABETIC RETINOPATHY: NORMAL
RIGHT EYE DIABETIC RETINOPATHY: NORMAL
SEVERITY (EYE EXAM): NORMAL

## 2024-02-02 PROCEDURE — 92004 COMPRE OPH EXAM NEW PT 1/>: CPT

## 2024-02-02 PROCEDURE — 92015 DETERMINE REFRACTIVE STATE: CPT

## 2024-02-02 ASSESSMENT — TONOMETRY
OD_IOP_MMHG: 17
OS_IOP_MMHG: 15

## 2024-02-02 ASSESSMENT — VISUAL ACUITY
OD_CC: 20/20
OS_CC: 20/20
OU_CC: J5

## 2024-02-03 PROCEDURE — 88305 TISSUE EXAM BY PATHOLOGIST: CPT | Performed by: PATHOLOGY

## 2024-02-04 ENCOUNTER — APPOINTMENT (OUTPATIENT)
Dept: LAB | Age: 44
End: 2024-02-04
Payer: COMMERCIAL

## 2024-02-04 DIAGNOSIS — Z79.899 ON STATIN THERAPY: ICD-10-CM

## 2024-02-04 DIAGNOSIS — E11.9 TYPE 2 DIABETES MELLITUS WITHOUT COMPLICATION, WITHOUT LONG-TERM CURRENT USE OF INSULIN (HCC): ICD-10-CM

## 2024-02-04 LAB
ALBUMIN SERPL BCP-MCNC: 4.2 G/DL (ref 3.5–5)
ALP SERPL-CCNC: 59 U/L (ref 34–104)
ALT SERPL W P-5'-P-CCNC: 19 U/L (ref 7–52)
AST SERPL W P-5'-P-CCNC: 13 U/L (ref 13–39)
BILIRUB DIRECT SERPL-MCNC: 0.17 MG/DL (ref 0–0.2)
BILIRUB SERPL-MCNC: 0.73 MG/DL (ref 0.2–1)
CREAT UR-MCNC: 55.4 MG/DL
MICROALBUMIN UR-MCNC: <7 MG/L
MICROALBUMIN/CREAT 24H UR: <13 MG/G CREATININE (ref 0–30)
PROT SERPL-MCNC: 7.5 G/DL (ref 6.4–8.4)

## 2024-02-04 PROCEDURE — 36415 COLL VENOUS BLD VENIPUNCTURE: CPT

## 2024-02-04 PROCEDURE — 82570 ASSAY OF URINE CREATININE: CPT

## 2024-02-04 PROCEDURE — 80076 HEPATIC FUNCTION PANEL: CPT

## 2024-02-04 PROCEDURE — 82043 UR ALBUMIN QUANTITATIVE: CPT

## 2024-03-20 DIAGNOSIS — E78.49 OTHER HYPERLIPIDEMIA: ICD-10-CM

## 2024-03-20 DIAGNOSIS — E11.9 TYPE 2 DIABETES MELLITUS WITHOUT COMPLICATION, WITHOUT LONG-TERM CURRENT USE OF INSULIN (HCC): ICD-10-CM

## 2024-03-20 RX ORDER — ATORVASTATIN CALCIUM 10 MG/1
10 TABLET, FILM COATED ORAL DAILY
Qty: 90 TABLET | Refills: 0 | Status: SHIPPED | OUTPATIENT
Start: 2024-03-20

## 2024-04-09 DIAGNOSIS — I10 UNCONTROLLED STAGE 2 HYPERTENSION: ICD-10-CM

## 2024-04-09 RX ORDER — LOSARTAN POTASSIUM AND HYDROCHLOROTHIAZIDE 25; 100 MG/1; MG/1
1 TABLET ORAL DAILY
Qty: 90 TABLET | Refills: 1 | Status: SHIPPED | OUTPATIENT
Start: 2024-04-09

## 2024-04-18 DIAGNOSIS — E11.9 TYPE 2 DIABETES MELLITUS WITHOUT COMPLICATION, WITHOUT LONG-TERM CURRENT USE OF INSULIN (HCC): ICD-10-CM

## 2024-04-18 RX ORDER — METFORMIN HYDROCHLORIDE 500 MG/1
500 TABLET, EXTENDED RELEASE ORAL
Qty: 90 TABLET | Refills: 0 | Status: SHIPPED | OUTPATIENT
Start: 2024-04-18 | End: 2024-10-15

## 2024-04-19 ENCOUNTER — OFFICE VISIT (OUTPATIENT)
Dept: INTERNAL MEDICINE CLINIC | Age: 44
End: 2024-04-19
Payer: COMMERCIAL

## 2024-04-19 ENCOUNTER — APPOINTMENT (OUTPATIENT)
Dept: LAB | Age: 44
End: 2024-04-19
Payer: COMMERCIAL

## 2024-04-19 VITALS
BODY MASS INDEX: 37.77 KG/M2 | OXYGEN SATURATION: 96 % | HEIGHT: 69 IN | TEMPERATURE: 98.4 F | HEART RATE: 80 BPM | WEIGHT: 255 LBS | DIASTOLIC BLOOD PRESSURE: 68 MMHG | SYSTOLIC BLOOD PRESSURE: 114 MMHG

## 2024-04-19 DIAGNOSIS — R31.29 OTHER MICROSCOPIC HEMATURIA: ICD-10-CM

## 2024-04-19 DIAGNOSIS — I10 ESSENTIAL HYPERTENSION: ICD-10-CM

## 2024-04-19 DIAGNOSIS — E11.9 TYPE 2 DIABETES MELLITUS WITHOUT COMPLICATION, WITHOUT LONG-TERM CURRENT USE OF INSULIN (HCC): ICD-10-CM

## 2024-04-19 DIAGNOSIS — R74.01 TRANSAMINITIS: ICD-10-CM

## 2024-04-19 DIAGNOSIS — Z00.00 ANNUAL PHYSICAL EXAM: ICD-10-CM

## 2024-04-19 DIAGNOSIS — R82.998 CRYSTALLURIA: ICD-10-CM

## 2024-04-19 DIAGNOSIS — Z00.00 ANNUAL PHYSICAL EXAM: Primary | ICD-10-CM

## 2024-04-19 DIAGNOSIS — E78.49 OTHER HYPERLIPIDEMIA: ICD-10-CM

## 2024-04-19 PROBLEM — R73.03 PREDIABETES: Status: RESOLVED | Noted: 2022-02-03 | Resolved: 2024-04-19

## 2024-04-19 LAB
ALBUMIN SERPL BCP-MCNC: 4.5 G/DL (ref 3.5–5)
ALP SERPL-CCNC: 59 U/L (ref 34–104)
ALT SERPL W P-5'-P-CCNC: 23 U/L (ref 7–52)
ANION GAP SERPL CALCULATED.3IONS-SCNC: 9 MMOL/L (ref 4–13)
AST SERPL W P-5'-P-CCNC: 18 U/L (ref 13–39)
BASOPHILS # BLD AUTO: 0.12 THOUSANDS/ÂΜL (ref 0–0.1)
BASOPHILS NFR BLD AUTO: 1 % (ref 0–1)
BILIRUB SERPL-MCNC: 0.59 MG/DL (ref 0.2–1)
BUN SERPL-MCNC: 15 MG/DL (ref 5–25)
CALCIUM SERPL-MCNC: 9.4 MG/DL (ref 8.4–10.2)
CHLORIDE SERPL-SCNC: 100 MMOL/L (ref 96–108)
CHOLEST SERPL-MCNC: 163 MG/DL
CO2 SERPL-SCNC: 30 MMOL/L (ref 21–32)
CREAT SERPL-MCNC: 0.72 MG/DL (ref 0.6–1.3)
EOSINOPHIL # BLD AUTO: 0.2 THOUSAND/ÂΜL (ref 0–0.61)
EOSINOPHIL NFR BLD AUTO: 2 % (ref 0–6)
ERYTHROCYTE [DISTWIDTH] IN BLOOD BY AUTOMATED COUNT: 13.2 % (ref 11.6–15.1)
GFR SERPL CREATININE-BSD FRML MDRD: 112 ML/MIN/1.73SQ M
GLUCOSE P FAST SERPL-MCNC: 76 MG/DL (ref 65–99)
HCT VFR BLD AUTO: 44 % (ref 36.5–49.3)
HDLC SERPL-MCNC: 59 MG/DL
HGB BLD-MCNC: 14.7 G/DL (ref 12–17)
IMM GRANULOCYTES # BLD AUTO: 0.09 THOUSAND/UL (ref 0–0.2)
IMM GRANULOCYTES NFR BLD AUTO: 1 % (ref 0–2)
LDLC SERPL CALC-MCNC: 90 MG/DL (ref 0–100)
LYMPHOCYTES # BLD AUTO: 2.94 THOUSANDS/ÂΜL (ref 0.6–4.47)
LYMPHOCYTES NFR BLD AUTO: 23 % (ref 14–44)
MCH RBC QN AUTO: 31.1 PG (ref 26.8–34.3)
MCHC RBC AUTO-ENTMCNC: 33.4 G/DL (ref 31.4–37.4)
MCV RBC AUTO: 93 FL (ref 82–98)
MONOCYTES # BLD AUTO: 1.06 THOUSAND/ÂΜL (ref 0.17–1.22)
MONOCYTES NFR BLD AUTO: 8 % (ref 4–12)
NEUTROPHILS # BLD AUTO: 8.65 THOUSANDS/ÂΜL (ref 1.85–7.62)
NEUTS SEG NFR BLD AUTO: 65 % (ref 43–75)
NONHDLC SERPL-MCNC: 104 MG/DL
NRBC BLD AUTO-RTO: 0 /100 WBCS
PLATELET # BLD AUTO: 344 THOUSANDS/UL (ref 149–390)
PMV BLD AUTO: 10.3 FL (ref 8.9–12.7)
POTASSIUM SERPL-SCNC: 4.2 MMOL/L (ref 3.5–5.3)
PROT SERPL-MCNC: 7.4 G/DL (ref 6.4–8.4)
PSA SERPL-MCNC: 0.69 NG/ML (ref 0–4)
RBC # BLD AUTO: 4.73 MILLION/UL (ref 3.88–5.62)
SODIUM SERPL-SCNC: 139 MMOL/L (ref 135–147)
TRIGL SERPL-MCNC: 71 MG/DL
TSH SERPL DL<=0.05 MIU/L-ACNC: 3.5 UIU/ML (ref 0.45–4.5)
WBC # BLD AUTO: 13.06 THOUSAND/UL (ref 4.31–10.16)

## 2024-04-19 PROCEDURE — 84443 ASSAY THYROID STIM HORMONE: CPT

## 2024-04-19 PROCEDURE — 80053 COMPREHEN METABOLIC PANEL: CPT

## 2024-04-19 PROCEDURE — 99214 OFFICE O/P EST MOD 30 MIN: CPT | Performed by: INTERNAL MEDICINE

## 2024-04-19 PROCEDURE — G0103 PSA SCREENING: HCPCS

## 2024-04-19 PROCEDURE — 80061 LIPID PANEL: CPT

## 2024-04-19 PROCEDURE — 36415 COLL VENOUS BLD VENIPUNCTURE: CPT

## 2024-04-19 PROCEDURE — 99396 PREV VISIT EST AGE 40-64: CPT | Performed by: INTERNAL MEDICINE

## 2024-04-19 PROCEDURE — 85025 COMPLETE CBC W/AUTO DIFF WBC: CPT

## 2024-04-19 NOTE — PROGRESS NOTES
Assessment/Plan:    Annual physical examination  - History and physical examination done  - Pt was counseled to eat a heart healthy diet, to drink at least 2 L of water daily, to take a daily multivitamin and to exercise for at least 30 minutes of cardio exercise daily, for at least 5 days a week.  - CBC, CMP, TSH and lipid panel have been ordered and we will follow-up with the results.  -He is up-to-date with 2 COVID vaccines and a flu shot.  - He is up-to-date with his colonoscopy  - follow up in 3 months       Diagnoses and all orders for this visit:    Annual physical exam  -     PSA, Total Screen; Future  -     CBC and differential; Future  -     TSH, 3rd generation with Free T4 reflex; Future  -     Comprehensive metabolic panel; Future  -     Lipid panel; Future    Type 2 diabetes mellitus without complication, without long-term current use of insulin (HCC)    Essential hypertension    Other hyperlipidemia    Transaminitis    Crystalluria    Other microscopic hematuria          Subjective:      Patient ID: Russell Campbell is a 43 y.o. male.    HPI    Patient presents for an annual physical exam.    Last annual physical exam-  3/17/23    Past medical history-htn, DM, hld, hematuria     Past surgical history-anal fistula sx    Medications- see list    Allergies-NKDA    Diet-  mixture of balanced and junk food, drinking 1 L of water daily     Exercise- not really    Alcohol use- none for several years    Caffeine and soda use- soda mostly     Nicotine use- never    Recreational drug use- none    Work--     Sexual history, STD history and HIV testing-not sexually active and never had an STD, hiv testing - never     Gynecological history/Prostate health/testicular health history- no luts    Colonoscopy-2/1/24 and they found polyp and internal hemorrhoids and diverticulosis     Immunization history-up to date with the covid shot x 2, flu shot, tdap - cant remember     Dental visit- every 6  months    Vision- glasses - myopia    Family history-  htn - parents  Dm - dad, Mom  Breast ca - mom  Cancer ? Primary - dad    Today, patient states that he has no complaints.  He has been taking his medications and states that his polyuria, polydipsia, and polyphagia and increased thirst have been improving.  He does not need a refill of his medications today  With regards to his microscopic hematuria he does not remember what urology wanted him to do and does not think that he had a cystoscopy.  He states that he does not know if his urologist is still working with [facility].  He denies any gross hematuria.  He admits to wt gain, constipation occasionally but does not drink enough water. He also admits to occasional insomnia but denies any fever, chills, night sweats, headache, dizziness, nasal congestion, runny nose, pnd, sore throat, ear ache, sinus pain or pressure, wheezing, cough, chest pain, sob, palpitations, nausea, vomiting, diarrhea,  hematochezia, hematuria, melena stools, arthralgias, myalgias, feelings of anxiety, depression.      The following portions of the patient's history were reviewed and updated as appropriate: He  has a past medical history of Hypertension (12/2021).  He   Patient Active Problem List    Diagnosis Date Noted   • Obesity 02/01/2024   • Hyponatremia 01/18/2024   • On statin therapy 01/18/2024   • Type 2 diabetes mellitus without complication, without long-term current use of insulin ([facility])    • BMI 40.0-44.9, adult ([facility]) 03/17/2023   • Leucocytosis 09/28/2022   • Skin tag of perianal region 07/27/2022   • Other hyperlipidemia 04/22/2022   • Other microscopic hematuria 04/22/2022   • 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   • Other proteinuria 02/03/2022   • Transaminitis 02/03/2022   • Elevated serum protein level 02/03/2022   • Essential hypertension 12/10/2021   • Annual physical exam 12/10/2021   • Closed low lateral malleolus fracture, left, with routine healing, subsequent encounter  03/19/2021     He  has a past surgical history that includes No past surgeries; pr anrct xm surg req anes general spi/edrl dx (N/A, 10/12/2022); and pr surg tx anal fistula subq (N/A, 10/12/2022).  His family history includes Cancer in his father; Diabetes in his father; Hypertension in his father and mother.  He  reports that he has never smoked. He has never used smokeless tobacco. He reports current alcohol use. He reports that he does not use drugs.  Current Outpatient Medications   Medication Sig Dispense Refill   • amLODIPine (NORVASC) 10 mg tablet Take 1 tablet (10 mg total) by mouth daily 90 tablet 1   • atorvastatin (LIPITOR) 10 mg tablet Take 1 tablet (10 mg total) by mouth daily 90 tablet 0   • glipiZIDE (GLUCOTROL) 5 mg tablet Take 1 tablet (5 mg total) by mouth daily with breakfast 30 tablet 5   • glucose blood (OneTouch Verio) test strip Check blood sugars once daily. Please substitute with appropriate alternative as covered by patient's insurance. Dx: E11.65 100 each 3   • losartan-hydrochlorothiazide (HYZAAR) 100-25 MG per tablet Take 1 tablet by mouth daily 90 tablet 1   • metFORMIN (GLUCOPHAGE-XR) 500 mg 24 hr tablet Take 1 tablet (500 mg total) by mouth daily with breakfast 90 tablet 0   • OneTouch Delica Lancets 33G MISC Check blood sugars once daily. Please substitute with appropriate alternative as covered by patient's insurance. Dx: E11.65 100 each 3   • sitaGLIPtin (JANUVIA) 100 mg tablet Take 1 tablet (100 mg total) by mouth daily 30 tablet 5   • Blood Glucose Monitoring Suppl (OneTouch Verio Reflect) w/Device KIT Check blood sugars once daily. Please substitute with appropriate alternative as covered by patient's insurance. Dx: E11.65 (Patient not taking: Reported on 4/19/2024) 1 kit 0     No current facility-administered medications for this visit.     Current Outpatient Medications on File Prior to Visit   Medication Sig   • amLODIPine (NORVASC) 10 mg tablet Take 1 tablet (10 mg total) by  mouth daily   • atorvastatin (LIPITOR) 10 mg tablet Take 1 tablet (10 mg total) by mouth daily   • glipiZIDE (GLUCOTROL) 5 mg tablet Take 1 tablet (5 mg total) by mouth daily with breakfast   • glucose blood (OneTouch Verio) test strip Check blood sugars once daily. Please substitute with appropriate alternative as covered by patient's insurance. Dx: E11.65   • losartan-hydrochlorothiazide (HYZAAR) 100-25 MG per tablet Take 1 tablet by mouth daily   • metFORMIN (GLUCOPHAGE-XR) 500 mg 24 hr tablet Take 1 tablet (500 mg total) by mouth daily with breakfast   • OneTouch Delica Lancets 33G MISC Check blood sugars once daily. Please substitute with appropriate alternative as covered by patient's insurance. Dx: E11.65   • sitaGLIPtin (JANUVIA) 100 mg tablet Take 1 tablet (100 mg total) by mouth daily   • Blood Glucose Monitoring Suppl (OneTouch Verio Reflect) w/Device KIT Check blood sugars once daily. Please substitute with appropriate alternative as covered by patient's insurance. Dx: E11.65 (Patient not taking: Reported on 4/19/2024)     No current facility-administered medications on file prior to visit.     He has No Known Allergies..    Review of Systems   Constitutional:  Positive for unexpected weight change. Negative for activity change, chills, fatigue and fever.   HENT:  Negative for ear pain, postnasal drip, rhinorrhea, sinus pressure and sore throat.    Eyes:  Negative for pain.   Respiratory:  Negative for cough, choking, chest tightness, shortness of breath and wheezing.    Cardiovascular:  Negative for chest pain, palpitations and leg swelling.   Gastrointestinal:  Positive for constipation (occasionally). Negative for abdominal pain, diarrhea, nausea and vomiting.   Endocrine: Positive for polydipsia (improving), polyphagia (occasionally) and polyuria (improving).   Genitourinary:  Negative for dysuria and hematuria.   Musculoskeletal:  Negative for arthralgias, back pain, gait problem, joint swelling,  "myalgias and neck stiffness.   Skin:  Negative for pallor and rash.   Neurological:  Negative for dizziness, tremors, seizures, syncope, light-headedness and headaches.   Hematological:  Negative for adenopathy.   Psychiatric/Behavioral:  Positive for sleep disturbance (occasionally). Negative for behavioral problems.          Objective:      /68 (BP Location: Left arm, Patient Position: Sitting, Cuff Size: Large)   Pulse 80   Temp 98.4 °F (36.9 °C) (Temporal)   Ht 5' 9\" (1.753 m)   Wt 116 kg (255 lb)   SpO2 96%   BMI 37.66 kg/m²          Physical Exam  Constitutional:       General: He is not in acute distress.     Appearance: He is well-developed. He is not diaphoretic.   HENT:      Head: Normocephalic and atraumatic.      Right Ear: External ear normal. Tympanic membrane is injected (dull TM b/l with injection of the R TM).      Left Ear: External ear normal.      Nose: Nose normal.      Mouth/Throat:      Mouth: Mucous membranes are dry.      Pharynx: Posterior oropharyngeal erythema (mild oropharyngeal erythema with dry mucous memb and Mallampati class 4 oropharynx) present. No oropharyngeal exudate.   Eyes:      General: No scleral icterus.        Right eye: No discharge.         Left eye: No discharge.      Conjunctiva/sclera: Conjunctivae normal.      Pupils: Pupils are equal, round, and reactive to light.   Neck:      Thyroid: No thyromegaly.      Vascular: No JVD.      Trachea: No tracheal deviation.   Cardiovascular:      Rate and Rhythm: Normal rate and regular rhythm.      Heart sounds: Heart sounds are distant. No murmur heard.     No friction rub. No gallop.   Pulmonary:      Effort: Pulmonary effort is normal. No respiratory distress.      Breath sounds: Normal breath sounds. No wheezing or rales.   Chest:      Chest wall: No tenderness.   Abdominal:      General: Bowel sounds are normal. There is no distension.      Palpations: Abdomen is soft. There is no mass.      Tenderness: There is " no abdominal tenderness. There is no guarding or rebound.   Musculoskeletal:         General: No tenderness. Normal range of motion.      Cervical back: Normal range of motion and neck supple.      Thoracic back: Deformity (thoracolumbar kyphosis) present.      Right lower le+ Pitting Edema present.      Left lower le+ Pitting Edema present.   Lymphadenopathy:      Cervical: No cervical adenopathy.   Skin:     General: Skin is warm and dry.      Coloration: Skin is not pale.      Findings: No erythema or rash.   Neurological:      Mental Status: He is alert and oriented to person, place, and time.      Cranial Nerves: No cranial nerve deficit.      Motor: No abnormal muscle tone.      Coordination: Coordination normal.      Deep Tendon Reflexes: Reflexes are normal and symmetric.      Comments: Cranial nerves 2-12 are intact bilaterally  Muscle strength is 5/5 in all extremities  Sensation is intact in bilateral face and extremities  Rapid alternating movement and finger-to-nose pointing test intact   Deep tendon reflexes are 2+ bilaterally  Gait is intact         Psychiatric:         Behavior: Behavior normal.           Appointment on 2024   Component Date Value Ref Range Status   • Total Bilirubin 2024 0.73  0.20 - 1.00 mg/dL Final    Use of this assay is not recommended for patients undergoing treatment with eltrombopag due to the potential for falsely elevated results.  N-acetyl-p-benzoquinone imine (metabolite of Acetaminophen) will generate erroneously low results in samples for patients that have taken an overdose of Acetaminophen.   • Bilirubin, Direct 2024 0.17  0.00 - 0.20 mg/dL Final   • Alkaline Phosphatase 2024 59  34 - 104 U/L Final   • AST 2024 13  13 - 39 U/L Final   • ALT 2024 19  7 - 52 U/L Final    Specimen collection should occur prior to Sulfasalazine administration due to the potential for falsely depressed results.    • Total Protein 2024 7.5   6.4 - 8.4 g/dL Final   • Albumin 02/04/2024 4.2  3.5 - 5.0 g/dL Final   • Creatinine, Ur 02/04/2024 55.4  Reference range not established. mg/dL Final   • Albumin,U,Random 02/04/2024 <7.0  <20.0 mg/L Final   • Albumin Creat Ratio 02/04/2024 <13  0 - 30 mg/g creatinine Final   Hospital Outpatient Visit on 02/01/2024   Component Date Value Ref Range Status   • POC Glucose 02/01/2024 158 (H)  65 - 140 mg/dl Final   • Case Report 02/01/2024    Final                    Value:Surgical Pathology Report                         Case: [id number]                                  Authorizing Provider:  Ramin Austin MD          Collected:           02/01/2024 0743              Ordering Location:     [facility]        Received:            02/01/2024 [address] [place] [address] Endoscopy                                                           Pathologist:           Hanna Burrell MD                                                         Specimen:    Colon, distal sigmoid polyp hot snare                                                     • Final Diagnosis 02/01/2024    Final                    Value:This result contains rich text formatting which cannot be displayed here.   • Additional Information 02/01/2024    Final                    Value:This result contains rich text formatting which cannot be displayed here.   • Synoptic Checklist 02/01/2024    Final                    Value:                            COLON/RECTUM POLYP FORM - GI - All Specimens                                                                                     :    Adenoma(s)     • Gross Description 02/01/2024    Final                    Value:This result contains rich text formatting which cannot be displayed here.   Office Visit on 01/18/2024   Component Date Value Ref Range Status   • GLUCOSE BLD 01/18/2024 307   Final   Appointment on 01/13/2024   Component Date Value Ref Range Status   • Sodium 01/13/2024 132  (L)  135 - 147 mmol/L Final   • Potassium 01/13/2024 3.9  3.5 - 5.3 mmol/L Final   • Chloride 01/13/2024 101  96 - 108 mmol/L Final   • CO2 01/13/2024 29  21 - 32 mmol/L Final   • ANION GAP 01/13/2024 2  mmol/L Final   • BUN 01/13/2024 11  5 - 25 mg/dL Final   • Creatinine 01/13/2024 0.65  0.60 - 1.30 mg/dL Final    Standardized to IDMS reference method   • Glucose, Fasting 01/13/2024 289 (H)  65 - 99 mg/dL Final   • Calcium 01/13/2024 9.5  8.4 - 10.2 mg/dL Final   • AST 01/13/2024 17  13 - 39 U/L Final   • ALT 01/13/2024 20  7 - 52 U/L Final    Specimen collection should occur prior to Sulfasalazine administration due to the potential for falsely depressed results.    • Alkaline Phosphatase 01/13/2024 57  34 - 104 U/L Final   • Total Protein 01/13/2024 7.1  6.4 - 8.4 g/dL Final   • Albumin 01/13/2024 4.2  3.5 - 5.0 g/dL Final   • Total Bilirubin 01/13/2024 0.88  0.20 - 1.00 mg/dL Final    Use of this assay is not recommended for patients undergoing treatment with eltrombopag due to the potential for falsely elevated results.  N-acetyl-p-benzoquinone imine (metabolite of Acetaminophen) will generate erroneously low results in samples for patients that have taken an overdose of Acetaminophen.   • eGFR 01/13/2024 117  ml/min/1.73sq m Final   • Hemoglobin A1C 01/13/2024 13.1 (H)  Normal 4.0-5.6%; PreDiabetic 5.7-6.4%; Diabetic >=6.5%; Glycemic control for adults with diabetes <7.0% % Final   • EAG 01/13/2024 329  mg/dl Final   Office Visit on 10/06/2023   Component Date Value Ref Range Status   • LEUKOCYTE ESTERASE,UA 10/06/2023 -   Final   • NITRITE,UA 10/06/2023 -   Final   • SL AMB POCT UROBILINOGEN 10/06/2023 0.2 E.U./dL   Final   • POCT URINE PROTEIN 10/06/2023 -   Final   •  PH,UA 10/06/2023 5.5   Final   • BLOOD,UA 10/06/2023 TRACE-LYSED   Final   • SPECIFIC GRAVITY,UA 10/06/2023 1.020   Final   • KETONES,UA 10/06/2023 40mg/dL   Final   • BILIRUBIN,UA 10/06/2023 SMALL   Final   • GLUCOSE, UA 10/06/2023 1000  mg/dL   Final   •  COLOR,UA 10/06/2023 YELLOW   Final   • CLARITY,UA 10/06/2023 CLEAR   Final   Appointment on 06/30/2023   Component Date Value Ref Range Status   • Hemoglobin A1C 06/30/2023 6.9 (H)  Normal 3.8-5.6%; PreDiabetic 5.7-6.4%; Diabetic >=6.5%; Glycemic control for adults with diabetes <7.0% % Final   • EAG 06/30/2023 151  mg/dl Final   • Sodium 06/30/2023 138  135 - 147 mmol/L Final   • Potassium 06/30/2023 4.0  3.5 - 5.3 mmol/L Final   • Chloride 06/30/2023 108  96 - 108 mmol/L Final   • CO2 06/30/2023 26  21 - 32 mmol/L Final   • ANION GAP 06/30/2023 4  mmol/L Final   • BUN 06/30/2023 14  5 - 25 mg/dL Final   • Creatinine 06/30/2023 0.83  0.60 - 1.30 mg/dL Final    Standardized to IDMS reference method   • Glucose, Fasting 06/30/2023 159 (H)  65 - 99 mg/dL Final    Specimen collection should occur prior to Sulfasalazine administration due to the potential for falsely depressed results. Specimen collection should occur prior to Sulfapyridine administration due to the potential for falsely elevated results.   • Calcium 06/30/2023 9.3  8.3 - 10.1 mg/dL Final   • AST 06/30/2023 77 (H)  5 - 45 U/L Final    Specimen collection should occur prior to Sulfasalazine administration due to the potential for falsely depressed results.    • ALT 06/30/2023 127 (H)  12 - 78 U/L Final    Specimen collection should occur prior to Sulfasalazine and/or Sulfapyridine administration due to the potential for falsely depressed results.    • Alkaline Phosphatase 06/30/2023 79  46 - 116 U/L Final   • Total Protein 06/30/2023 8.2  6.4 - 8.4 g/dL Final   • Albumin 06/30/2023 3.8  3.5 - 5.0 g/dL Final   • Total Bilirubin 06/30/2023 0.68  0.20 - 1.00 mg/dL Final    Use of this assay is not recommended for patients undergoing treatment with eltrombopag due to the potential for falsely elevated results.   • eGFR 06/30/2023 107  ml/min/1.73sq m Final

## 2024-04-19 NOTE — PROGRESS NOTES
Assessment/Plan:    Diabetes mellitus 2  - blood glucose is improving  -continue with Januvia, metformin and glipizide  -continue with a diabetic diet low in carbohydrates and simple sugars  -continue with exercise  -We will follow-up with the results of his repeat hemoglobin A1c    Hypertension   - blood pressure is well controlled   -continue with losartan-HCTZ and amlodipine  -continue with a low-salt diet and with exercise    Transaminitis  -Resolved  -Will continue to monitor patient clinically and monitor his liver function since he is on atorvastatin    Crystalluria  -Patient was counseled on the importance of drinking at least 2 L of water daily  -We will recheck his urinalysis at his next visit    Microscopic hematuria  -Chronic  -Patient was counseled to increase his water intake and to drink at least 2 L of water  Recheck urinalysis at his next visit     Diagnoses and all orders for this visit:    Annual physical exam  -     PSA, Total Screen; Future  -     CBC and differential; Future  -     TSH, 3rd generation with Free T4 reflex; Future  -     Comprehensive metabolic panel; Future  -     Lipid panel; Future    Type 2 diabetes mellitus without complication, without long-term current use of insulin (HCC)    Essential hypertension    Other hyperlipidemia    Transaminitis    Crystalluria    Other microscopic hematuria          Subjective:      Patient ID: Russell Campbell is a 43 y.o. male.    HPI    Today, also presents for a follow-up visit regarding his new onset diabetes mellitus, essential hypertension, hyperlipidemia, transaminitis and hematuria.  Patient states that he has no complaints.  He has been taking his medications and states that his polyuria, polydipsia, and polyphagia and increased thirst have been improving.  He does not need a refill of his medications today  With regards to his microscopic hematuria he does not remember what urology wanted him to do and does not think that he had a  cystoscopy.  He states that he does not know if his urologist is still working with [facility].  He denies any gross hematuria.  He admits to wt gain, constipation occasionally but does not drink enough water. He also admits to occasional insomnia but denies any fever, chills, night sweats, headache, dizziness, nasal congestion, runny nose, pnd, sore throat, ear ache, sinus pain or pressure, wheezing, cough, chest pain, sob, palpitations, nausea, vomiting, diarrhea,  hematochezia, hematuria, melena stools, arthralgias, myalgias, feelings of anxiety, depression.    The following portions of the patient's history were reviewed and updated as appropriate: He  has a past medical history of Hypertension (12/2021).  He   Patient Active Problem List    Diagnosis Date Noted   • Obesity 02/01/2024   • Hyponatremia 01/18/2024   • On statin therapy 01/18/2024   • Type 2 diabetes mellitus without complication, without long-term current use of insulin ([facility])    • BMI 40.0-44.9, adult ([facility]) 03/17/2023   • Leucocytosis 09/28/2022   • Skin tag of perianal region 07/27/2022   • Other hyperlipidemia 04/22/2022   • Other microscopic hematuria 04/22/2022   • 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   • Other proteinuria 02/03/2022   • Transaminitis 02/03/2022   • Elevated serum protein level 02/03/2022   • Essential hypertension 12/10/2021   • Annual physical exam 12/10/2021   • Closed low lateral malleolus fracture, left, with routine healing, subsequent encounter 03/19/2021     He  has a past surgical history that includes No past surgeries; pr anrct xm surg req anes general spi/edrl dx (N/A, 10/12/2022); and pr surg tx anal fistula subq (N/A, 10/12/2022).  His family history includes Cancer in his father; Diabetes in his father; Hypertension in his father and mother.  He  reports that he has never smoked. He has never used smokeless tobacco. He reports current alcohol use. He reports that he does not use drugs.  Current Outpatient Medications    Medication Sig Dispense Refill   • amLODIPine (NORVASC) 10 mg tablet Take 1 tablet (10 mg total) by mouth daily 90 tablet 1   • atorvastatin (LIPITOR) 10 mg tablet Take 1 tablet (10 mg total) by mouth daily 90 tablet 0   • glipiZIDE (GLUCOTROL) 5 mg tablet Take 1 tablet (5 mg total) by mouth daily with breakfast 30 tablet 5   • glucose blood (OneTouch Verio) test strip Check blood sugars once daily. Please substitute with appropriate alternative as covered by patient's insurance. Dx: E11.65 100 each 3   • losartan-hydrochlorothiazide (HYZAAR) 100-25 MG per tablet Take 1 tablet by mouth daily 90 tablet 1   • metFORMIN (GLUCOPHAGE-XR) 500 mg 24 hr tablet Take 1 tablet (500 mg total) by mouth daily with breakfast 90 tablet 0   • OneTouch Delica Lancets 33G MISC Check blood sugars once daily. Please substitute with appropriate alternative as covered by patient's insurance. Dx: E11.65 100 each 3   • sitaGLIPtin (JANUVIA) 100 mg tablet Take 1 tablet (100 mg total) by mouth daily 30 tablet 5   • Blood Glucose Monitoring Suppl (OneTouch Verio Reflect) w/Device KIT Check blood sugars once daily. Please substitute with appropriate alternative as covered by patient's insurance. Dx: E11.65 (Patient not taking: Reported on 4/19/2024) 1 kit 0     No current facility-administered medications for this visit.     Current Outpatient Medications on File Prior to Visit   Medication Sig   • amLODIPine (NORVASC) 10 mg tablet Take 1 tablet (10 mg total) by mouth daily   • atorvastatin (LIPITOR) 10 mg tablet Take 1 tablet (10 mg total) by mouth daily   • glipiZIDE (GLUCOTROL) 5 mg tablet Take 1 tablet (5 mg total) by mouth daily with breakfast   • glucose blood (OneTouch Verio) test strip Check blood sugars once daily. Please substitute with appropriate alternative as covered by patient's insurance. Dx: E11.65   • losartan-hydrochlorothiazide (HYZAAR) 100-25 MG per tablet Take 1 tablet by mouth daily   • metFORMIN (GLUCOPHAGE-XR) 500 mg  "24 hr tablet Take 1 tablet (500 mg total) by mouth daily with breakfast   • OneTouch Delica Lancets 33G MISC Check blood sugars once daily. Please substitute with appropriate alternative as covered by patient's insurance. Dx: E11.65   • sitaGLIPtin (JANUVIA) 100 mg tablet Take 1 tablet (100 mg total) by mouth daily   • Blood Glucose Monitoring Suppl (OneTouch Verio Reflect) w/Device KIT Check blood sugars once daily. Please substitute with appropriate alternative as covered by patient's insurance. Dx: E11.65 (Patient not taking: Reported on 4/19/2024)     No current facility-administered medications on file prior to visit.     He has No Known Allergies..    Review of Systems   Constitutional:  Negative for activity change, chills, fatigue, fever and unexpected weight change.   HENT:  Negative for ear pain, postnasal drip, rhinorrhea, sinus pressure and sore throat.    Eyes:  Negative for pain.   Respiratory:  Negative for cough, choking, chest tightness, shortness of breath and wheezing.    Cardiovascular:  Negative for chest pain, palpitations and leg swelling.   Gastrointestinal:  Positive for constipation (Occasionally). Negative for abdominal pain, diarrhea, nausea and vomiting.   Endocrine: Positive for polydipsia (Improving), polyphagia (Improving) and polyuria (Improving).   Genitourinary:  Negative for dysuria and hematuria.   Musculoskeletal:  Negative for arthralgias, back pain, gait problem, joint swelling, myalgias and neck stiffness.   Skin:  Negative for pallor and rash.   Neurological:  Negative for dizziness, tremors, seizures, syncope, light-headedness and headaches.   Hematological:  Negative for adenopathy.   Psychiatric/Behavioral:  Positive for sleep disturbance (Occasionally). Negative for behavioral problems.          Objective:      /68 (BP Location: Left arm, Patient Position: Sitting, Cuff Size: Large)   Pulse 80   Temp 98.4 °F (36.9 °C) (Temporal)   Ht 5' 9\" (1.753 m)   Wt 116 kg " (255 lb)   SpO2 96%   BMI 37.66 kg/m²          Physical Exam  Constitutional:       General: He is not in acute distress.     Appearance: He is well-developed. He is not diaphoretic.   HENT:      Head: Normocephalic and atraumatic.      Right Ear: External ear normal.      Left Ear: External ear normal.      Nose: Nose normal.      Mouth/Throat:      Mouth: Mucous membranes are dry.      Pharynx: Posterior oropharyngeal erythema (Mild oropharyngeal erythema with dry mucous membranes and Mallampati class III oropharynx) present. No oropharyngeal exudate.   Eyes:      General: No scleral icterus.        Right eye: No discharge.         Left eye: No discharge.      Conjunctiva/sclera: Conjunctivae normal.      Pupils: Pupils are equal, round, and reactive to light.   Neck:      Thyroid: No thyromegaly.      Vascular: No JVD.      Trachea: No tracheal deviation.   Cardiovascular:      Rate and Rhythm: Normal rate and regular rhythm.      Heart sounds: Heart sounds are distant. No murmur heard.     No friction rub. No gallop.   Pulmonary:      Effort: Pulmonary effort is normal. No respiratory distress.      Breath sounds: Normal breath sounds. No wheezing or rales.   Chest:      Chest wall: No tenderness.   Abdominal:      General: Bowel sounds are normal. There is no distension.      Palpations: Abdomen is soft. There is no mass.      Tenderness: There is no abdominal tenderness. There is no guarding or rebound.   Musculoskeletal:         General: No tenderness. Normal range of motion.      Cervical back: Normal range of motion and neck supple.      Thoracic back: Deformity (Thoracolumbar kyphosis) present.      Right lower le+ Pitting Edema present.      Left lower le+ Pitting Edema present.   Lymphadenopathy:      Cervical: No cervical adenopathy.   Skin:     General: Skin is warm and dry.      Coloration: Skin is not pale.      Findings: No erythema or rash.   Neurological:      Mental Status: He is alert  and oriented to person, place, and time.      Cranial Nerves: No cranial nerve deficit.      Motor: No abnormal muscle tone.      Coordination: Coordination normal.      Deep Tendon Reflexes: Reflexes are normal and symmetric.   Psychiatric:         Behavior: Behavior normal.           Appointment on 02/04/2024   Component Date Value Ref Range Status   • Total Bilirubin 02/04/2024 0.73  0.20 - 1.00 mg/dL Final    Use of this assay is not recommended for patients undergoing treatment with eltrombopag due to the potential for falsely elevated results.  N-acetyl-p-benzoquinone imine (metabolite of Acetaminophen) will generate erroneously low results in samples for patients that have taken an overdose of Acetaminophen.   • Bilirubin, Direct 02/04/2024 0.17  0.00 - 0.20 mg/dL Final   • Alkaline Phosphatase 02/04/2024 59  34 - 104 U/L Final   • AST 02/04/2024 13  13 - 39 U/L Final   • ALT 02/04/2024 19  7 - 52 U/L Final    Specimen collection should occur prior to Sulfasalazine administration due to the potential for falsely depressed results.    • Total Protein 02/04/2024 7.5  6.4 - 8.4 g/dL Final   • Albumin 02/04/2024 4.2  3.5 - 5.0 g/dL Final   • Creatinine, Ur 02/04/2024 55.4  Reference range not established. mg/dL Final   • Albumin,U,Random 02/04/2024 <7.0  <20.0 mg/L Final   • Albumin Creat Ratio 02/04/2024 <13  0 - 30 mg/g creatinine Final   Hospital Outpatient Visit on 02/01/2024   Component Date Value Ref Range Status   • POC Glucose 02/01/2024 158 (H)  65 - 140 mg/dl Final   • Case Report 02/01/2024    Final                    Value:Surgical Pathology Report                         Case: [id number]                                  Authorizing Provider:  Ramin Austin MD          Collected:           02/01/2024 0743              Ordering Location:     [facility]        Received:            02/01/2024 [address] [place] [address]                                                            Pathologist:           Hanna Burrell MD                                                         Specimen:    Colon, distal sigmoid polyp hot snare                                                     • Final Diagnosis 02/01/2024    Final                    Value:This result contains rich text formatting which cannot be displayed here.   • Additional Information 02/01/2024    Final                    Value:This result contains rich text formatting which cannot be displayed here.   • Synoptic Checklist 02/01/2024    Final                    Value:                            COLON/RECTUM POLYP FORM - GI - All Specimens                                                                                     :    Adenoma(s)     • Gross Description 02/01/2024    Final                    Value:This result contains rich text formatting which cannot be displayed here.   Office Visit on 01/18/2024   Component Date Value Ref Range Status   • GLUCOSE BLD 01/18/2024 307   Final   Appointment on 01/13/2024   Component Date Value Ref Range Status   • Sodium 01/13/2024 132 (L)  135 - 147 mmol/L Final   • Potassium 01/13/2024 3.9  3.5 - 5.3 mmol/L Final   • Chloride 01/13/2024 101  96 - 108 mmol/L Final   • CO2 01/13/2024 29  21 - 32 mmol/L Final   • ANION GAP 01/13/2024 2  mmol/L Final   • BUN 01/13/2024 11  5 - 25 mg/dL Final   • Creatinine 01/13/2024 0.65  0.60 - 1.30 mg/dL Final    Standardized to IDMS reference method   • Glucose, Fasting 01/13/2024 289 (H)  65 - 99 mg/dL Final   • Calcium 01/13/2024 9.5  8.4 - 10.2 mg/dL Final   • AST 01/13/2024 17  13 - 39 U/L Final   • ALT 01/13/2024 20  7 - 52 U/L Final    Specimen collection should occur prior to Sulfasalazine administration due to the potential for falsely depressed results.    • Alkaline Phosphatase 01/13/2024 57  34 - 104 U/L Final   • Total Protein 01/13/2024 7.1  6.4 - 8.4 g/dL Final   • Albumin 01/13/2024 4.2  3.5 - 5.0 g/dL Final   • Total Bilirubin  01/13/2024 0.88  0.20 - 1.00 mg/dL Final    Use of this assay is not recommended for patients undergoing treatment with eltrombopag due to the potential for falsely elevated results.  N-acetyl-p-benzoquinone imine (metabolite of Acetaminophen) will generate erroneously low results in samples for patients that have taken an overdose of Acetaminophen.   • eGFR 01/13/2024 117  ml/min/1.73sq m Final   • Hemoglobin A1C 01/13/2024 13.1 (H)  Normal 4.0-5.6%; PreDiabetic 5.7-6.4%; Diabetic >=6.5%; Glycemic control for adults with diabetes <7.0% % Final   • EAG 01/13/2024 329  mg/dl Final   Office Visit on 10/06/2023   Component Date Value Ref Range Status   • LEUKOCYTE ESTERASE,UA 10/06/2023 -   Final   • NITRITE,UA 10/06/2023 -   Final   • SL AMB POCT UROBILINOGEN 10/06/2023 0.2 E.U./dL   Final   • POCT URINE PROTEIN 10/06/2023 -   Final   •  PH,UA 10/06/2023 5.5   Final   • BLOOD,UA 10/06/2023 TRACE-LYSED   Final   • SPECIFIC GRAVITY,UA 10/06/2023 1.020   Final   • KETONES,UA 10/06/2023 40mg/dL   Final   • BILIRUBIN,UA 10/06/2023 SMALL   Final   • GLUCOSE, UA 10/06/2023 1000 mg/dL   Final   •  COLOR,UA 10/06/2023 YELLOW   Final   • CLARITY,UA 10/06/2023 CLEAR   Final   Appointment on 06/30/2023   Component Date Value Ref Range Status   • Hemoglobin A1C 06/30/2023 6.9 (H)  Normal 3.8-5.6%; PreDiabetic 5.7-6.4%; Diabetic >=6.5%; Glycemic control for adults with diabetes <7.0% % Final   • EAG 06/30/2023 151  mg/dl Final   • Sodium 06/30/2023 138  135 - 147 mmol/L Final   • Potassium 06/30/2023 4.0  3.5 - 5.3 mmol/L Final   • Chloride 06/30/2023 108  96 - 108 mmol/L Final   • CO2 06/30/2023 26  21 - 32 mmol/L Final   • ANION GAP 06/30/2023 4  mmol/L Final   • BUN 06/30/2023 14  5 - 25 mg/dL Final   • Creatinine 06/30/2023 0.83  0.60 - 1.30 mg/dL Final    Standardized to IDMS reference method   • Glucose, Fasting 06/30/2023 159 (H)  65 - 99 mg/dL Final    Specimen collection should occur prior to Sulfasalazine administration  due to the potential for falsely depressed results. Specimen collection should occur prior to Sulfapyridine administration due to the potential for falsely elevated results.   • Calcium 06/30/2023 9.3  8.3 - 10.1 mg/dL Final   • AST 06/30/2023 77 (H)  5 - 45 U/L Final    Specimen collection should occur prior to Sulfasalazine administration due to the potential for falsely depressed results.    • ALT 06/30/2023 127 (H)  12 - 78 U/L Final    Specimen collection should occur prior to Sulfasalazine and/or Sulfapyridine administration due to the potential for falsely depressed results.    • Alkaline Phosphatase 06/30/2023 79  46 - 116 U/L Final   • Total Protein 06/30/2023 8.2  6.4 - 8.4 g/dL Final   • Albumin 06/30/2023 3.8  3.5 - 5.0 g/dL Final   • Total Bilirubin 06/30/2023 0.68  0.20 - 1.00 mg/dL Final    Use of this assay is not recommended for patients undergoing treatment with eltrombopag due to the potential for falsely elevated results.   • eGFR 06/30/2023 107  ml/min/1.73sq m Final

## 2024-05-18 DIAGNOSIS — I10 UNCONTROLLED STAGE 2 HYPERTENSION: ICD-10-CM

## 2024-05-18 RX ORDER — AMLODIPINE BESYLATE 10 MG/1
10 TABLET ORAL DAILY
Qty: 90 TABLET | Refills: 1 | Status: SHIPPED | OUTPATIENT
Start: 2024-05-18

## 2024-06-18 DIAGNOSIS — E78.49 OTHER HYPERLIPIDEMIA: ICD-10-CM

## 2024-06-18 DIAGNOSIS — E11.9 TYPE 2 DIABETES MELLITUS WITHOUT COMPLICATION, WITHOUT LONG-TERM CURRENT USE OF INSULIN (HCC): ICD-10-CM

## 2024-06-19 RX ORDER — GLIPIZIDE 5 MG/1
5 TABLET ORAL
Qty: 30 TABLET | Refills: 5 | Status: SHIPPED | OUTPATIENT
Start: 2024-06-19

## 2024-06-19 RX ORDER — ATORVASTATIN CALCIUM 10 MG/1
10 TABLET, FILM COATED ORAL DAILY
Qty: 90 TABLET | Refills: 1 | Status: SHIPPED | OUTPATIENT
Start: 2024-06-19

## 2024-07-18 DIAGNOSIS — E11.9 TYPE 2 DIABETES MELLITUS WITHOUT COMPLICATION, WITHOUT LONG-TERM CURRENT USE OF INSULIN (HCC): ICD-10-CM

## 2024-07-18 RX ORDER — METFORMIN HYDROCHLORIDE 500 MG/1
500 TABLET, EXTENDED RELEASE ORAL
Qty: 30 TABLET | Refills: 0 | Status: SHIPPED | OUTPATIENT
Start: 2024-07-18 | End: 2024-08-17

## 2024-08-01 ENCOUNTER — OFFICE VISIT (OUTPATIENT)
Dept: INTERNAL MEDICINE CLINIC | Age: 44
End: 2024-08-01
Payer: COMMERCIAL

## 2024-08-01 ENCOUNTER — TELEPHONE (OUTPATIENT)
Dept: ADMINISTRATIVE | Facility: OTHER | Age: 44
End: 2024-08-01

## 2024-08-01 VITALS
TEMPERATURE: 98.2 F | HEIGHT: 69 IN | WEIGHT: 274 LBS | HEART RATE: 71 BPM | DIASTOLIC BLOOD PRESSURE: 66 MMHG | OXYGEN SATURATION: 95 % | BODY MASS INDEX: 40.58 KG/M2 | SYSTOLIC BLOOD PRESSURE: 124 MMHG

## 2024-08-01 DIAGNOSIS — E78.49 OTHER HYPERLIPIDEMIA: ICD-10-CM

## 2024-08-01 DIAGNOSIS — E66.01 CLASS 3 SEVERE OBESITY DUE TO EXCESS CALORIES WITHOUT SERIOUS COMORBIDITY WITH BODY MASS INDEX (BMI) OF 40.0 TO 44.9 IN ADULT (HCC): ICD-10-CM

## 2024-08-01 DIAGNOSIS — Z02.89 ENCOUNTER FOR COMPLETION OF FORM WITH PATIENT: ICD-10-CM

## 2024-08-01 DIAGNOSIS — D72.828 CHRONIC NEUTROPHILIA: ICD-10-CM

## 2024-08-01 DIAGNOSIS — R82.998 CRYSTALLURIA: ICD-10-CM

## 2024-08-01 DIAGNOSIS — D72.828 OTHER ELEVATED WHITE BLOOD CELL (WBC) COUNT: ICD-10-CM

## 2024-08-01 DIAGNOSIS — E11.9 TYPE 2 DIABETES MELLITUS WITHOUT COMPLICATION, WITHOUT LONG-TERM CURRENT USE OF INSULIN (HCC): Primary | ICD-10-CM

## 2024-08-01 DIAGNOSIS — I10 ESSENTIAL HYPERTENSION: ICD-10-CM

## 2024-08-01 DIAGNOSIS — R31.29 OTHER MICROSCOPIC HEMATURIA: ICD-10-CM

## 2024-08-01 LAB — SL AMB POCT HEMOGLOBIN AIC: 5.7 (ref ?–6.5)

## 2024-08-01 PROCEDURE — 99215 OFFICE O/P EST HI 40 MIN: CPT | Performed by: INTERNAL MEDICINE

## 2024-08-01 PROCEDURE — 83036 HEMOGLOBIN GLYCOSYLATED A1C: CPT | Performed by: INTERNAL MEDICINE

## 2024-08-01 NOTE — LETTER
Diabetic Eye Exam Form    Date Requested: 24  Patient: Russell Campbell  Patient : 1980   Referring Provider: Divine Villalobos,       DIABETIC Eye Exam Date _______________________________      Type of Exam MUST be documented for Diabetic Eye Exams. Please CHECK ONE.     Retinal Exam       Dilated Retinal Exam       OCT       Optomap-Iris Exam      Fundus Photography       Left Eye - Please check Retinopathy or No Retinopathy        Exam did show retinopathy    Exam did not show retinopathy       Right Eye - Please check Retinopathy or No Retinopathy       Exam did show retinopathy    Exam did not show retinopathy       Comments __________________________________________________________    Practice Providing Exam ______________________________________________    Exam Performed By (print name) _______________________________________      Provider Signature ___________________________________________________      These reports are needed for  compliance.  Please fax this completed form and a copy of the Diabetic Eye Exam report to our office located at [address] [place] [address] as soon as possible via Fax [phone] attention Brit: Phone [phone]  We thank you for your assistance in treating our mutual patient.

## 2024-08-01 NOTE — TELEPHONE ENCOUNTER
----- Message from Jolly PERRY sent at 8/1/2024  8:26 AM EDT -----  Regarding: diabetic eye exam  08/01/24 8:27 AM    Hello, our patient Russell Campbell has had Diabetic Eye Exam completed/performed. Please assist in updating the patient chart by making an External outreach to [facility] facility located in [place], [place]. The date of service is 2/24.    Thank you,  Jolly Oconnor MA  [facility]

## 2024-08-01 NOTE — PATIENT INSTRUCTIONS
"Patient Education     Carb counting for adults with diabetes   The Basics   Written by the doctors and editors at [facility]   What is carb counting? -- This is a type of meal planning that many people with diabetes use. It is a way to figure out how many carbohydrates, or \"carbs,\" you eat.  The body breaks down the food we eat into 3 main types of nutrients: carbs, proteins, and fats. Carbs are sugars and starches that come from food. The body uses carbs for energy.  Why do I need to count carbs? -- People with diabetes need to pay attention to how many carbs they eat. This is because carbs raise your blood sugar level.  Carb counting helps you:   Choose the right amount of insulin to take before meals and snacks - If you take insulin before meals, the dose depends on several things, including how many carbs you plan to eat. (It also depends on how much you plan to exercise and your blood sugar level.)   Plan your meals and snacks for the day - You can use carb counting to figure out how many carbs to eat at each meal and snack. This helps you make sure that you eat the right amount over the entire day.   Keep your blood sugar levels well managed - Spreading out the carbs you eat over a whole day can help keep your blood sugar from getting too high. If you take insulin or another diabetes medicine that can cause low blood sugar, eating about the same amount of carbs at each meal every day also helps keep your blood sugar from getting too low. Reducing the amount of carbs you eat can help you manage your diabetes better and prevent medical problems that diabetes can cause.  Your doctor, nurse, or dietitian (food expert) can help you figure out how many carbs to try to eat each day. This will depend on your eating habits, weight, activity level, and which diabetes medicines you take.  People who take insulin before meals might need to be very careful when they count the carbs in every meal and snack. This is so they " "can give themselves the right amount of insulin. If the insulin dose doesn't match the amount of carbs, their blood sugar might get too low or too high. Other people might be able to be a little more flexible as long as they get about the same amount of carbs at each meal or throughout the day.  Which foods have carbs? -- Foods with a lot of carbs include:   Grains - These include bread, pasta, rice, and cereal.   Fruits and starchy vegetables - Starchy vegetables include potatoes, corn, and squash.   Milk and other dairy products - Dairy products include cheese and yogurt.   Foods with added sugar - These include sweets and baked goods likes cookies and cakes, as well as sugary drinks like juice and soda.  It is best to get most of your carbs from fruits, vegetables, whole grains (like whole-wheat bread, whole-grain cereals, and brown rice), and low-fat milk and dairy products.  How do I count carbs? -- To count carbs in packaged foods, check the food's nutrition label (if it has one).  On the label (figure 1), check for:   \"Total Carbohydrate\" number - This tells you how many carbs are in 1 serving size of the food. If you eat 1 serving, then the number of carbs you eat is the same as the number of total carbohydrates.   \"Serving size\" - This tells you how much food is in 1 serving. If you have 2 servings, the number of carbs will be 2 times the number of carbohydrates listed.   \"Dietary Fiber\" - Fiber is a carb that is not digested, which means that it does not raise blood sugar. Foods with a lot of fiber can help manage your blood sugar. If a food has more than 5 grams (g) of fiber, you need less insulin to cover the total carbs in that food. So, if you are calculating an insulin dose, only count the carbs that are not from fiber (figure 1).  What is exchange planning? -- Exchange planning, or the \"exchange system,\" is a way for people to plan their meals without reading labels. This can be helpful since many " "foods don't come with a nutrition label.  The exchange system involves knowing how much of different foods have about 15 grams of carbs (table 1 and table 2 and table 3). Your doctor, nurse, or dietitian gives you a certain number of \"carb choices\" to eat with each meal and snack (table 4). Each \"choice\" is a portion of food that has about 15 grams of carbs. Knowing your options makes it easier to \"exchange\" 1 carb choice for another as you plan your meals and snacks. For example, 1 small apple could be exchanged for 1/3 cup of pasta.  How can I plan my meals? -- First, make sure that you know how many carbs you should be eating each day. Ask your doctor, nurse, or dietitian if you are not sure.  Here are some tips that might help:   Spread out your carbs over 4 to 6 small meals each day instead of 3 big ones.   Eat a similar number of carbs at each meal, for example, at each dinner.   Eat your meals at a similar time each day.   Plan your meals ahead of time.   Use the \"plate method.\" This is a simpler way to make sure that you get a good balance of carbs and other nutrients with each meal. It is not as exact as counting all of your carbs, but it can be helpful for people who prefer a simpler approach. If you take insulin before meals, it is generally better to adjust your insulin dose by counting how many carbs you plan to eat or using the exchange planning strategy.  For the plate method, you start with a plate about 9 inches (23 cm) across. Fill it with (figure 2):   1/2 non-starchy vegetables   1/4 protein   1/4 carbs   Follow your doctor's instructions for how and when to check your blood sugar. This can help you learn how certain foods affect your blood sugar.   Keep track of your meals and blood sugar levels. Show this to your doctor or nurse so they can adjust your treatment if needed. If you take insulin, you will also need to keep track of your exercise patterns and how much insulin you give yourself with " "each dose.   If you take insulin, make sure that you understand how to use it. This includes knowing how to adjust the dose based on your blood sugar level and what you plan to eat. Foods that have a lot of protein or fat also can affect your blood sugar level. Some people need to adjust their insulin doses when they eat these foods.   Remember that other things besides carbs can raise or lower your blood sugar level. These things can include exercise, getting sick, drinking alcohol, traveling, and stress. If you take insulin, make sure that you know how and when to adjust your dose in these situations.  If you are having trouble counting carbs or managing your blood sugar, talk to your doctor or nurse. They can help. A dietitian can also help you plan specific menus that will give you the right amount of carbs each day.  For more information, you can also get a book on counting carbs or check the American Diabetes Association website (www.diabetes.org).  All topics are updated as new evidence becomes available and our peer review process is complete.  This topic retrieved from [employer] on: Mar 27, 2024.  Topic 52029 Version 11.0  Release: 32.2.4 - C32.85  © 2024 [employer] and/or its affiliates. All rights reserved.  figure 1: Counting carbohydrates     To figure out the \"carb count\" in 1 serving, start with the number of grams of total carbohydrates (46 grams), then subtract the number of grams of dietary fiber (7 grams). It's also important to look at the serving size. In this example, the carb count is 39 grams. You can use this number when counting carbs for your insulin dose.  Graphic 88369 Version 8.0  table 1: Bread and grains with 15 grams of carbs*  Bread    Food  Serving size    Bagel 1/4 large bagel (1 oz)   Biscuit 1 biscuit (2.5 inches across)   Bread, reduced calorie, light 2 slices (1.5 oz)   Cornbread 1.75 inch cube (1.5 oz)   English muffin 1/2 muffin   Hot dog or hamburger bun 1/2 bun (3/4 oz) " "  Naan, chapati, or roti 1 oz   Pancake 1 pancake (4 inches across, 1/4 inch thick)   Irish (6 inches across) 1/2 irish   Tortilla, corn 1 small tortilla (6 inches across)   Tortilla, flour (white or whole wheat) 1 small tortilla (6 inches across) or 1/3 large tortilla (10 inches across)   Waffle 1 waffle (4-inch square or 4 inches across)   Cereals and grains (including pasta and rice)    Food  Serving size (cooked)    Barley, couscous, millet, pasta (white or whole wheat, all shapes and sizes), polenta, quinoa (all colors), or rice (white, brown, and other colors and types) 1/3 cup   Bran cereal (twigs, buds, or flakes), shredded wheat (plain), or sugar-coated cereal 1/2 cup   Bulgur, kasha, tabbouleh (tabouli), or wild rice 1/2 cup   Granola cereal 1/4 cup   Hot cereal (oats, oatmeal, grits) 1/2 cup   Unsweetened, ready-to-eat cereal 3/4 cup   * For bread and grains, 15 grams of carbs is considered 1 serving or \"choice\" for people who need to count carbs.  Graphic 784551 Version 1.0  table 2: Fruits with 15 grams of carbs*  Food  Serving size    Applesauce, unsweetened 1/2 cup   Banana 1 extra small banana, about 4 inches long (4 oz)   Blueberries 3/4 cup   Dried fruits (blueberries, cherries, cranberries, mixed fruit, raisins) 2 tbsp   Fruit, canned 1/2 cup   Fruit, whole, small (apple) 1 small fruit (4 oz)   Fruit, whole, medium (nectarine, orange, pear, tangerine) 1 medium fruit (6 oz)   Fruit juice, unsweetened 1/2 cup   Grapes 17 small grapes (3 oz)   Melon, diced 1 cup   Strawberries, whole 1 and 1/4 cups   When listed, weight (oz) includes skin and seeds. If you are not sure if your fruit is the right size for 1 serving, you can use a food scale to check the weight.  * For fruits, 15 grams of carbs is considered 1 serving or \"choice\" for people who need to count carbs.  Graphic [id number] Version 1.0  table 3: Starchy vegetables with 15 grams of carbs*  Food  Serving size (cooked)    Cassava, dasheen, or " "plantain 1/3 cup   Corn, green peas, mixed vegetables, or parsnips 1/2 cup   Marinara, pasta, or spaghetti sauce 1/2 cup   Mixed vegetables (with corn or peas) 1 cup   Potato, baked with skin 1/4 large (3 oz)   Potato, Wolof-fried (oven-baked) 1 cup (2 oz)   Potato, mashed with milk and fat 1/2 cup   Squash, winter (acorn, butternut) 1 cup   Yam or sweet potato, plain 1/2 cup (3 and 1/2 oz)   If you are not sure if your vegetable is the right size for 1 serving, you can use a food scale to check the weight.  * For starchy vegetables, 15 grams of carbs is considered 1 serving or \"choice\" for people who need to count carbs.  Graphic [id number] Version 1.0  table 4: Sample exchange system meal plan  Time  Exchange pattern  Sample menu  Carbohydrate count (g)    8 am 3 carbohydrate group    2 starch 1 English muffin 30    1 fruit 1 1/4 c strawberries 15    1 protein group 1/4 c cottage cheese -    1 fat group 1 tsp margarine -      Total: 45    12 noon 4 carbohydrate group    2 starch 2 slices of bread 30    1 fruit 1 orange 15    1 vegetable 1 c salad -    1 milk 8 oz skim milk 12    3 protein group 3 oz chicken -    1 fat group 1 tbsp low fat naranjo -      Total: 57    3 pm 1 carbohydrate group    1 fruit or 1 starch 1 apple or 6 crackers 15      Total: 15    6 pm 4 carbohydrate group    2 starch 1 c potato 30    1 fruit 1/2 c fruit salad 15    1 vegetable 1 c salad -    1 milk 8 oz skim milk 12    6 protein group 6 oz fish -    1 fat group 2 tbsp low fat salad dressing -      Total: 57    9 pm 1 carbohydrate group    1 starch 6 crackers 15    1 protein 2 tbsp peanut butter -      Total: 15    Graphic [id number] Version 3.0  figure 2: The \"plate method\"     For the plate method, you start with a plate about 9 inches (23 cm) across. Then fill it with 1/2 non-starchy vegetables, 1/4 protein, and 1/4 carbs.  Graphic 527693 Version 2.0  Consumer Information Use and Disclaimer   Disclaimer: This generalized information is a limited " summary of diagnosis, treatment, and/or medication information. It is not meant to be comprehensive and should be used as a tool to help the user understand and/or assess potential diagnostic and treatment options. It does NOT include all information about conditions, treatments, medications, side effects, or risks that may apply to a specific patient. It is not intended to be medical advice or a substitute for the medical advice, diagnosis, or treatment of a health care provider based on the health care provider's examination and assessment of a patient's specific and unique circumstances. Patients must speak with a health care provider for complete information about their health, medical questions, and treatment options, including any risks or benefits regarding use of medications. This information does not endorse any treatments or medications as safe, effective, or approved for treating a specific patient. [employer] and its affiliates disclaim any warranty or liability relating to this information or the use thereof.The use of this information is governed by the Terms of Use, available at https://www.wolterskluwer.com/en/know/clinical-effectiveness-terms. 2024© [employer] and its affiliates and/or licensors. All rights reserved.  Copyright   © 2024 [employer] and/or its affiliates. All rights reserved.

## 2024-08-01 NOTE — PROGRESS NOTES
Assessment/Plan:    Diabetes mellitus type 2  - blood glucose is well controlled , point-of-care hemoglobin A1c was 5.7, down from 13.1  -continue with Sitagliptin, metformin and glipizide  -continue with a diabetic diet low in carbohydrates and simple sugars  -continue with exercise  - Microalbumin/ creatinine ratio in urine was done on 2/4/2024  -Diabetic eye exam was done on 2/14/2024  -Follow-up in 6 months    Essential hypertension   - blood pressure is well controlled   -continue with losartan-hydrochlorothiazide and amlodipine  -continue with a low-salt diet and with exercise    Hyperlipidemia  -Lipids are fairly well controlled  - last lipid panel done on 4/19/2024, showed a total cholesterol of 163, down from 179,  A triglyceride of 71, down from 101, and HDL of 59, up from 45 and an LDL of 90, down from 114  -continue with a heart healthy diet  -continue with atorvastatin    Microscopic hematuria and crystalluria  -Stable  -Patient was encouraged to increase his water intake to at least 2 L daily  -We will recheck a urinalysis with microscopy    Chronic neutrophilia  -Mildly worsening, last CBC done on 4/19/2024 showed a WBC of 13.06, up from 7.50 on 3/10/2023 with absolute neutrophilia of 8.65 and absolute basophilia of 0.12  -We will order repeat CBC to monitor for resolution or stability    Encounter for completion of form  -Annual physical exam was done on 4/19/2024  -Will complete his work physical  form as requested    Obesity/BMI 40.46  -I discussed diet and exercise with patient, including diabetic carb counting  -I recommended that he attend his diabetic education classes but he is not interested at this time  -Weight discussed the possibility of his using a GLP-1 agonist such as Ozempic as long as there are no contraindications and he is not interested at this time       Diagnoses and all orders for this visit:    Type 2 diabetes mellitus without complication, without long-term current use of  insulin (HCC)  -     POCT hemoglobin A1c  -     CBC and differential; Future    Essential hypertension  -     CBC and differential; Future    Other microscopic hematuria  -     Urinalysis with microscopic; Future  -     CBC and differential; Future    Other hyperlipidemia  -     CBC and differential; Future    Crystalluria  -     Urinalysis with microscopic; Future  -     CBC and differential; Future    Chronic neutrophilia  -     CBC and differential; Future    Other elevated white blood cell (WBC) count    Class 3 severe obesity due to excess calories without serious comorbidity with body mass index (BMI) of 40.0 to 44.9 in adult ([facility])    Encounter for completion of form with patient          Subjective:      Patient ID: Russell Campbell is a 43 y.o. male.    HPI    Patient presents for a follow-up visit regarding his diabetes mellitus, essential hypertension, hyperlipidemia, leukocytosis, microscopic hematuria.  He states that he  has been taking his medications as prescribed  Today pt states that he does not check his bp at home or his bg  Has never seen blood in his urine   Drinking about 1 L of water daily average and sometimes more   Has done his diabetic eye exam - feb this year  He states that he has been with since he was diagnosed with diabetes.  He states that he initially lost some weight and then when we started treating his diabetes, he started gaining weight.  He states that he did not go for the diabetic education classes and does not really watch his diet and does not calorie count.  He denies  any fever, chills, night sweats, headache, dizziness, nasal congestion, runny nose, pnd, sore throat, ear ache, sinus pain or pressure, wheezing, cough, chest pain, sob, palpitations, nausea, vomiting, diarrhea, constipation, hematochezia, hematuria, melena stools, arthralgias, myalgias, feelings of anxiety, depression or insomnia.  He had his physical done April 2024 and would like to have a physical  form completed for his job.    The following portions of the patient's history were reviewed and updated as appropriate: He  has a past medical history of Hypertension (12/2021).  He   Patient Active Problem List    Diagnosis Date Noted   • Chronic neutrophilia 08/01/2024   • Obesity 02/01/2024   • Hyponatremia 01/18/2024   • On statin therapy 01/18/2024   • Type 2 diabetes mellitus without complication, without long-term current use of insulin ([facility])    • BMI 40.0-44.9, adult ([facility]) 03/17/2023   • Leucocytosis 09/28/2022   • Skin tag of perianal region 07/27/2022   • Other hyperlipidemia 04/22/2022   • Other microscopic hematuria 04/22/2022   • 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   • Other proteinuria 02/03/2022   • Transaminitis 02/03/2022   • Elevated serum protein level 02/03/2022   • Essential hypertension 12/10/2021   • Annual physical exam 12/10/2021   • Closed low lateral malleolus fracture, left, with routine healing, subsequent encounter 03/19/2021     He  has a past surgical history that includes No past surgeries; pr anrct xm surg req anes general spi/edrl dx (N/A, 10/12/2022); and pr surg tx anal fistula subq (N/A, 10/12/2022).  His family history includes Cancer in his father; Diabetes in his father; Hypertension in his father and mother.  He  reports that he has never smoked. He has never used smokeless tobacco. He reports current alcohol use. He reports that he does not use drugs.  Current Outpatient Medications   Medication Sig Dispense Refill   • amLODIPine (NORVASC) 10 mg tablet Take 1 tablet (10 mg total) by mouth daily 90 tablet 1   • atorvastatin (LIPITOR) 10 mg tablet Take 1 tablet (10 mg total) by mouth daily 90 tablet 1   • glipiZIDE (GLUCOTROL) 5 mg tablet Take 1 tablet (5 mg total) by mouth daily with breakfast 30 tablet 5   • glucose blood (OneTouch Verio) test strip Check blood sugars once daily. Please substitute with appropriate alternative as covered by patient's insurance. Dx: E11.65 100 each 3    • losartan-hydrochlorothiazide (HYZAAR) 100-25 MG per tablet Take 1 tablet by mouth daily 90 tablet 1   • metFORMIN (GLUCOPHAGE-XR) 500 mg 24 hr tablet Take 1 tablet (500 mg total) by mouth daily with breakfast 30 tablet 0   • OneTouch Delica Lancets 33G MISC Check blood sugars once daily. Please substitute with appropriate alternative as covered by patient's insurance. Dx: E11.65 100 each 3   • sitaGLIPtin (JANUVIA) 100 mg tablet Take 1 tablet (100 mg total) by mouth daily 30 tablet 5   • Blood Glucose Monitoring Suppl (OneTouch Verio Reflect) w/Device KIT Check blood sugars once daily. Please substitute with appropriate alternative as covered by patient's insurance. Dx: E11.65 (Patient not taking: Reported on 4/19/2024) 1 kit 0     No current facility-administered medications for this visit.     Current Outpatient Medications on File Prior to Visit   Medication Sig   • amLODIPine (NORVASC) 10 mg tablet Take 1 tablet (10 mg total) by mouth daily   • atorvastatin (LIPITOR) 10 mg tablet Take 1 tablet (10 mg total) by mouth daily   • glipiZIDE (GLUCOTROL) 5 mg tablet Take 1 tablet (5 mg total) by mouth daily with breakfast   • glucose blood (OneTouch Verio) test strip Check blood sugars once daily. Please substitute with appropriate alternative as covered by patient's insurance. Dx: E11.65   • losartan-hydrochlorothiazide (HYZAAR) 100-25 MG per tablet Take 1 tablet by mouth daily   • metFORMIN (GLUCOPHAGE-XR) 500 mg 24 hr tablet Take 1 tablet (500 mg total) by mouth daily with breakfast   • OneTouch Delica Lancets 33G MISC Check blood sugars once daily. Please substitute with appropriate alternative as covered by patient's insurance. Dx: E11.65   • sitaGLIPtin (JANUVIA) 100 mg tablet Take 1 tablet (100 mg total) by mouth daily   • Blood Glucose Monitoring Suppl (OneTouch Verio Reflect) w/Device KIT Check blood sugars once daily. Please substitute with appropriate alternative as covered by patient's insurance. Dx:  "E11.65 (Patient not taking: Reported on 4/19/2024)     No current facility-administered medications on file prior to visit.     He has No Known Allergies..    Review of Systems   Constitutional:  Positive for unexpected weight change (wt gain). Negative for activity change, chills, fatigue and fever.   HENT:  Negative for ear pain, postnasal drip, rhinorrhea, sinus pressure and sore throat.    Eyes:  Negative for pain.   Respiratory:  Negative for cough, choking, chest tightness, shortness of breath and wheezing.    Cardiovascular:  Positive for leg swelling. Negative for chest pain and palpitations.   Gastrointestinal:  Negative for abdominal pain, constipation, diarrhea, nausea and vomiting.   Endocrine: Positive for polyuria (Occasionally).   Genitourinary:  Negative for dysuria and hematuria.   Musculoskeletal:  Negative for arthralgias, back pain, gait problem, joint swelling, myalgias and neck stiffness.   Skin:  Negative for pallor and rash.   Neurological:  Negative for dizziness, tremors, seizures, syncope, light-headedness and headaches.   Hematological:  Negative for adenopathy.   Psychiatric/Behavioral:  Negative for behavioral problems, dysphoric mood and sleep disturbance. The patient is not nervous/anxious.          Objective:      /66 (BP Location: Left arm, Patient Position: Sitting, Cuff Size: Large)   Pulse 71   Temp 98.2 °F (36.8 °C) (Temporal)   Ht 5' 9\" (1.753 m)   Wt 124 kg (274 lb)   SpO2 95%   BMI 40.46 kg/m²          Physical Exam  Constitutional:       General: He is not in acute distress.     Appearance: He is well-developed. He is not diaphoretic.   HENT:      Head: Normocephalic and atraumatic.      Right Ear: External ear normal.      Left Ear: External ear normal.      Nose: Nose normal.      Mouth/Throat:      Pharynx: No oropharyngeal exudate.   Eyes:      General: No scleral icterus.        Right eye: No discharge.         Left eye: No discharge.      " "Conjunctiva/sclera: Conjunctivae normal.      Pupils: Pupils are equal, round, and reactive to light.   Neck:      Thyroid: No thyromegaly.      Vascular: No JVD.      Trachea: No tracheal deviation.      Comments: Neck circumference - 17.5\"    Cardiovascular:      Rate and Rhythm: Normal rate and regular rhythm.      Heart sounds: Heart sounds are distant. No murmur heard.     No friction rub. No gallop.   Pulmonary:      Effort: Pulmonary effort is normal. No respiratory distress.      Breath sounds: Decreased breath sounds present. No wheezing or rales.   Chest:      Chest wall: No tenderness.   Abdominal:      General: Bowel sounds are normal. There is no distension.      Palpations: Abdomen is soft. There is no mass.      Tenderness: There is no abdominal tenderness. There is no guarding or rebound.      Comments: Waist circumference- 50.5\"   Musculoskeletal:         General: No tenderness or deformity. Normal range of motion.      Cervical back: Normal range of motion and neck supple.      Right lower le+ Pitting Edema present.      Left lower le+ Pitting Edema present.   Lymphadenopathy:      Cervical: No cervical adenopathy.   Skin:     General: Skin is warm and dry.      Coloration: Skin is not pale.      Findings: No erythema or rash.   Neurological:      Mental Status: He is alert and oriented to person, place, and time.      Cranial Nerves: No cranial nerve deficit.      Motor: No abnormal muscle tone.      Coordination: Coordination normal.      Deep Tendon Reflexes: Reflexes are normal and symmetric.   Psychiatric:         Behavior: Behavior normal.           Office Visit on 2024   Component Date Value Ref Range Status   • Hemoglobin A1C 2024 5.7  6.5 Final   Appointment on 2024   Component Date Value Ref Range Status   • PSA 2024 0.69  0.00 - 4.00 ng/mL Final    [employer] Access chemiluminescent immunoassay. Confirm baseline values for patients being serially " monitored.    • WBC 04/19/2024 13.06 (H)  4.31 - 10.16 Thousand/uL Final   • RBC 04/19/2024 4.73  3.88 - 5.62 Million/uL Final   • Hemoglobin 04/19/2024 14.7  12.0 - 17.0 g/dL Final   • Hematocrit 04/19/2024 44.0  36.5 - 49.3 % Final   • MCV 04/19/2024 93  82 - 98 fL Final   • MCH 04/19/2024 31.1  26.8 - 34.3 pg Final   • MCHC 04/19/2024 33.4  31.4 - 37.4 g/dL Final   • RDW 04/19/2024 13.2  11.6 - 15.1 % Final   • MPV 04/19/2024 10.3  8.9 - 12.7 fL Final   • Platelets 04/19/2024 344  149 - 390 Thousands/uL Final   • nRBC 04/19/2024 0  /100 WBCs Final   • Segmented % 04/19/2024 65  43 - 75 % Final   • Immature Grans % 04/19/2024 1  0 - 2 % Final   • Lymphocytes % 04/19/2024 23  14 - 44 % Final   • Monocytes % 04/19/2024 8  4 - 12 % Final   • Eosinophils Relative 04/19/2024 2  0 - 6 % Final   • Basophils Relative 04/19/2024 1  0 - 1 % Final   • Absolute Neutrophils 04/19/2024 8.65 (H)  1.85 - 7.62 Thousands/µL Final   • Absolute Immature Grans 04/19/2024 0.09  0.00 - 0.20 Thousand/uL Final   • Absolute Lymphocytes 04/19/2024 2.94  0.60 - 4.47 Thousands/µL Final   • Absolute Monocytes 04/19/2024 1.06  0.17 - 1.22 Thousand/µL Final   • Eosinophils Absolute 04/19/2024 0.20  0.00 - 0.61 Thousand/µL Final   • Basophils Absolute 04/19/2024 0.12 (H)  0.00 - 0.10 Thousands/µL Final   • TSH 3RD GENERATON 04/19/2024 3.497  0.450 - 4.500 uIU/mL Final    Adult TSH (3rd generation) reference range follows the recommended guidelines of the American Thyroid Association, January, 2020.   • Sodium 04/19/2024 139  135 - 147 mmol/L Final   • Potassium 04/19/2024 4.2  3.5 - 5.3 mmol/L Final   • Chloride 04/19/2024 100  96 - 108 mmol/L Final   • CO2 04/19/2024 30  21 - 32 mmol/L Final   • ANION GAP 04/19/2024 9  4 - 13 mmol/L Final   • BUN 04/19/2024 15  5 - 25 mg/dL Final   • Creatinine 04/19/2024 0.72  0.60 - 1.30 mg/dL Final    Standardized to IDMS reference method   • Glucose, Fasting 04/19/2024 76  65 - 99 mg/dL Final   • Calcium  04/19/2024 9.4  8.4 - 10.2 mg/dL Final   • AST 04/19/2024 18  13 - 39 U/L Final   • ALT 04/19/2024 23  7 - 52 U/L Final    Specimen collection should occur prior to Sulfasalazine administration due to the potential for falsely depressed results.    • Alkaline Phosphatase 04/19/2024 59  34 - 104 U/L Final   • Total Protein 04/19/2024 7.4  6.4 - 8.4 g/dL Final   • Albumin 04/19/2024 4.5  3.5 - 5.0 g/dL Final   • Total Bilirubin 04/19/2024 0.59  0.20 - 1.00 mg/dL Final    Use of this assay is not recommended for patients undergoing treatment with eltrombopag due to the potential for falsely elevated results.  N-acetyl-p-benzoquinone imine (metabolite of Acetaminophen) will generate erroneously low results in samples for patients that have taken an overdose of Acetaminophen.   • eGFR 04/19/2024 112  ml/min/1.73sq m Final   • Cholesterol 04/19/2024 163  See Comment mg/dL Final    Cholesterol:         Pediatric <18 Years        Desirable          <170 mg/dL      Borderline High    170-199 mg/dL      High               >=200 mg/dL        Adult >=18 Years            Desirable         <200 mg/dL      Borderline High   200-239 mg/dL      High              >239 mg/dL     • Triglycerides 04/19/2024 71  See Comment mg/dL Final    Triglyceride:     0-9Y            <75mg/dL     10Y-17Y         <90 mg/dL       >=18Y     Normal          <150 mg/dL     Borderline High 150-199 mg/dL     High            200-499 mg/dL        Very High       >499 mg/dL    Specimen collection should occur prior to Metamizole administration due to the potential for falsely depressed results.   • HDL, Direct 04/19/2024 59  >=40 mg/dL Final   • LDL Calculated 04/19/2024 90  0 - 100 mg/dL Final    LDL Cholesterol:     Optimal           <100 mg/dl     Near Optimal      100-129 mg/dl     Above Optimal       Borderline High 130-159 mg/dl       High            160-189 mg/dl       Very High       >189 mg/dl         This screening LDL is a calculated result.   It  does not have the accuracy of the Direct Measured LDL in the monitoring of patients with hyperlipidemia and/or statin therapy.   Direct Measure LDL (APH937) must be ordered separately in these patients.   • Non-HDL-Chol (CHOL-HDL) 04/19/2024 104  mg/dl Final   Appointment on 02/04/2024   Component Date Value Ref Range Status   • Total Bilirubin 02/04/2024 0.73  0.20 - 1.00 mg/dL Final    Use of this assay is not recommended for patients undergoing treatment with eltrombopag due to the potential for falsely elevated results.  N-acetyl-p-benzoquinone imine (metabolite of Acetaminophen) will generate erroneously low results in samples for patients that have taken an overdose of Acetaminophen.   • Bilirubin, Direct 02/04/2024 0.17  0.00 - 0.20 mg/dL Final   • Alkaline Phosphatase 02/04/2024 59  34 - 104 U/L Final   • AST 02/04/2024 13  13 - 39 U/L Final   • ALT 02/04/2024 19  7 - 52 U/L Final    Specimen collection should occur prior to Sulfasalazine administration due to the potential for falsely depressed results.    • Total Protein 02/04/2024 7.5  6.4 - 8.4 g/dL Final   • Albumin 02/04/2024 4.2  3.5 - 5.0 g/dL Final   • Creatinine, Ur 02/04/2024 55.4  Reference range not established. mg/dL Final   • Albumin,U,Random 02/04/2024 <7.0  <20.0 mg/L Final   • Albumin Creat Ratio 02/04/2024 <13  0 - 30 mg/g creatinine Final   Hospital Outpatient Visit on 02/01/2024   Component Date Value Ref Range Status   • POC Glucose 02/01/2024 158 (H)  65 - 140 mg/dl Final   • Case Report 02/01/2024    Final                    Value:Surgical Pathology Report                         Case: [id number]                                  Authorizing Provider:  Ramin Austin MD          Collected:           02/01/2024 0743              Ordering Location:     [facility]        Received:            02/01/2024 [address] [place] [address]                                                            Pathologist:           Hanna Burrell MD                                                         Specimen:    Colon, distal sigmoid polyp hot snare                                                     • Final Diagnosis 02/01/2024    Final                    Value:A. Sigmoid colon polyp (polypectomy):                           -Tubular adenoma, excised                          - No high grade dysplasia    • Additional Information 02/01/2024    Final                    Value:All reported additional testing was performed with appropriately reactive                           controls.  These tests were developed and their performance                           characteristics determined by [facility] or                           appropriate performing facility, though some tests may be performed on                           tissues which have not been validated for performance characteristics                           (such as staining performed on alcohol exposed cell blocks and decalcified                           tissues).  Results should be interpreted with caution and in the context                           of the patients’ clinical condition. These tests may not be cleared or                           approved by the U.S. Food and Drug Administration, though the FDA has                           determined that such clearance or approval is not necessary. These tests                           are used for clinical purposes and they should not be regarded as                           investigational or for research. This laboratory has been approved by CLIA                           88, designated as a high-complexity laboratory and is qualified to perform                           these tests.                          .   • Synoptic Checklist 02/01/2024    Final                    Value:                            COLON/RECTUM POLYP FORM - GI - All Specimens                                "                                                      :    Adenoma(s)     • Gross Description 02/01/2024    Final                    Value:A. The specimen is received in formalin, labeled with the patient's name                           and hospital number, and is designated \" distal sigmoid polyp\".  The                           specimen consists of a tan-pink polyp measuring 1.4 cm.  The margin of                           resection is inked black.  The specimen is bisected lengthwise                           perpendicular to the inked margin.  Entirely submitted. Screened cassette.                                                    Note: The estimated total formalin fixation time based upon information                           provided by the submitting clinician and the standard processing schedule                           is under 72 hours.                                                    MCrites   Office Visit on 01/18/2024   Component Date Value Ref Range Status   • GLUCOSE BLD 01/18/2024 307   Final   Appointment on 01/13/2024   Component Date Value Ref Range Status   • Sodium 01/13/2024 132 (L)  135 - 147 mmol/L Final   • Potassium 01/13/2024 3.9  3.5 - 5.3 mmol/L Final   • Chloride 01/13/2024 101  96 - 108 mmol/L Final   • CO2 01/13/2024 29  21 - 32 mmol/L Final   • ANION GAP 01/13/2024 2  mmol/L Final   • BUN 01/13/2024 11  5 - 25 mg/dL Final   • Creatinine 01/13/2024 0.65  0.60 - 1.30 mg/dL Final    Standardized to IDMS reference method   • Glucose, Fasting 01/13/2024 289 (H)  65 - 99 mg/dL Final   • Calcium 01/13/2024 9.5  8.4 - 10.2 mg/dL Final   • AST 01/13/2024 17  13 - 39 U/L Final   • ALT 01/13/2024 20  7 - 52 U/L Final    Specimen collection should occur prior to Sulfasalazine administration due to the potential for falsely depressed results.    • Alkaline Phosphatase 01/13/2024 57  34 - 104 U/L Final   • Total Protein 01/13/2024 7.1  6.4 - 8.4 g/dL Final   • Albumin 01/13/2024 4.2  3.5 - " 5.0 g/dL Final   • Total Bilirubin 01/13/2024 0.88  0.20 - 1.00 mg/dL Final    Use of this assay is not recommended for patients undergoing treatment with eltrombopag due to the potential for falsely elevated results.  N-acetyl-p-benzoquinone imine (metabolite of Acetaminophen) will generate erroneously low results in samples for patients that have taken an overdose of Acetaminophen.   • eGFR 01/13/2024 117  ml/min/1.73sq m Final   • Hemoglobin A1C 01/13/2024 13.1 (H)  Normal 4.0-5.6%; PreDiabetic 5.7-6.4%; Diabetic >=6.5%; Glycemic control for adults with diabetes <7.0% % Final   • EAG 01/13/2024 329  mg/dl Final   Office Visit on 10/06/2023   Component Date Value Ref Range Status   • LEUKOCYTE ESTERASE,UA 10/06/2023 -   Final   • NITRITE,UA 10/06/2023 -   Final   • SL AMB POCT UROBILINOGEN 10/06/2023 0.2 E.U./dL   Final   • POCT URINE PROTEIN 10/06/2023 -   Final   •  PH,UA 10/06/2023 5.5   Final   • BLOOD,UA 10/06/2023 TRACE-LYSED   Final   • SPECIFIC GRAVITY,UA 10/06/2023 1.020   Final   • KETONES,UA 10/06/2023 40mg/dL   Final   • BILIRUBIN,UA 10/06/2023 SMALL   Final   • GLUCOSE, UA 10/06/2023 1000 mg/dL   Final   •  COLOR,UA 10/06/2023 YELLOW   Final   • CLARITY,UA 10/06/2023 CLEAR   Final

## 2024-08-01 NOTE — LETTER
Diabetic Eye Exam Form    Date Requested: 24  Patient: Russell Campbell  Patient : 1980   Referring Provider: Divine Villalobos,       DIABETIC Eye Exam Date _______________________________      Type of Exam MUST be documented for Diabetic Eye Exams. Please CHECK ONE.     Retinal Exam       Dilated Retinal Exam       OCT       Optomap-Iris Exam      Fundus Photography       Left Eye - Please check Retinopathy or No Retinopathy        Exam did show retinopathy    Exam did not show retinopathy       Right Eye - Please check Retinopathy or No Retinopathy       Exam did show retinopathy    Exam did not show retinopathy       Comments __________________________________________________________    Practice Providing Exam ______________________________________________    Exam Performed By (print name) _______________________________________      Provider Signature ___________________________________________________      These reports are needed for  compliance.  Please fax this completed form and a copy of the Diabetic Eye Exam report to our office located at [address] [place] [address] as soon as possible via Fax [phone] attention Brit: Phone [phone]  We thank you for your assistance in treating our mutual patient.

## 2024-08-15 ENCOUNTER — APPOINTMENT (OUTPATIENT)
Dept: LAB | Age: 44
End: 2024-08-15
Payer: COMMERCIAL

## 2024-08-15 DIAGNOSIS — E11.9 TYPE 2 DIABETES MELLITUS WITHOUT COMPLICATION, WITHOUT LONG-TERM CURRENT USE OF INSULIN (HCC): ICD-10-CM

## 2024-08-15 DIAGNOSIS — I10 ESSENTIAL HYPERTENSION: ICD-10-CM

## 2024-08-15 DIAGNOSIS — R82.998 CRYSTALLURIA: ICD-10-CM

## 2024-08-15 DIAGNOSIS — E78.49 OTHER HYPERLIPIDEMIA: ICD-10-CM

## 2024-08-15 DIAGNOSIS — D72.828 CHRONIC NEUTROPHILIA: ICD-10-CM

## 2024-08-15 DIAGNOSIS — R31.29 OTHER MICROSCOPIC HEMATURIA: ICD-10-CM

## 2024-08-15 LAB
BACTERIA UR QL AUTO: ABNORMAL /HPF
BASOPHILS # BLD AUTO: 0.08 THOUSANDS/ÂΜL (ref 0–0.1)
BASOPHILS NFR BLD AUTO: 1 % (ref 0–1)
BILIRUB UR QL STRIP: NEGATIVE
CLARITY UR: CLEAR
COLOR UR: ABNORMAL
EOSINOPHIL # BLD AUTO: 0.4 THOUSAND/ÂΜL (ref 0–0.61)
EOSINOPHIL NFR BLD AUTO: 3 % (ref 0–6)
ERYTHROCYTE [DISTWIDTH] IN BLOOD BY AUTOMATED COUNT: 13.7 % (ref 11.6–15.1)
EST. AVERAGE GLUCOSE BLD GHB EST-MCNC: 123 MG/DL
GLUCOSE UR STRIP-MCNC: NEGATIVE MG/DL
HBA1C MFR BLD: 5.9 %
HCT VFR BLD AUTO: 41.2 % (ref 36.5–49.3)
HGB BLD-MCNC: 13.6 G/DL (ref 12–17)
HGB UR QL STRIP.AUTO: ABNORMAL
HYALINE CASTS #/AREA URNS LPF: ABNORMAL /LPF
IMM GRANULOCYTES # BLD AUTO: 0.09 THOUSAND/UL (ref 0–0.2)
IMM GRANULOCYTES NFR BLD AUTO: 1 % (ref 0–2)
KETONES UR STRIP-MCNC: NEGATIVE MG/DL
LEUKOCYTE ESTERASE UR QL STRIP: NEGATIVE
LYMPHOCYTES # BLD AUTO: 2.37 THOUSANDS/ÂΜL (ref 0.6–4.47)
LYMPHOCYTES NFR BLD AUTO: 18 % (ref 14–44)
MCH RBC QN AUTO: 30.6 PG (ref 26.8–34.3)
MCHC RBC AUTO-ENTMCNC: 33 G/DL (ref 31.4–37.4)
MCV RBC AUTO: 93 FL (ref 82–98)
MONOCYTES # BLD AUTO: 0.95 THOUSAND/ÂΜL (ref 0.17–1.22)
MONOCYTES NFR BLD AUTO: 7 % (ref 4–12)
MUCOUS THREADS UR QL AUTO: ABNORMAL
NEUTROPHILS # BLD AUTO: 9.34 THOUSANDS/ÂΜL (ref 1.85–7.62)
NEUTS SEG NFR BLD AUTO: 70 % (ref 43–75)
NITRITE UR QL STRIP: NEGATIVE
NON-SQ EPI CELLS URNS QL MICRO: ABNORMAL /HPF
NRBC BLD AUTO-RTO: 0 /100 WBCS
PH UR STRIP.AUTO: 5.5 [PH]
PLATELET # BLD AUTO: 318 THOUSANDS/UL (ref 149–390)
PMV BLD AUTO: 11 FL (ref 8.9–12.7)
PROT UR STRIP-MCNC: ABNORMAL MG/DL
RBC # BLD AUTO: 4.44 MILLION/UL (ref 3.88–5.62)
RBC #/AREA URNS AUTO: ABNORMAL /HPF
SP GR UR STRIP.AUTO: 1.02 (ref 1–1.03)
UROBILINOGEN UR STRIP-ACNC: <2 MG/DL
WBC # BLD AUTO: 13.23 THOUSAND/UL (ref 4.31–10.16)
WBC #/AREA URNS AUTO: ABNORMAL /HPF

## 2024-08-15 PROCEDURE — 81001 URINALYSIS AUTO W/SCOPE: CPT

## 2024-08-15 PROCEDURE — 85025 COMPLETE CBC W/AUTO DIFF WBC: CPT

## 2024-08-19 DIAGNOSIS — E11.9 TYPE 2 DIABETES MELLITUS WITHOUT COMPLICATION, WITHOUT LONG-TERM CURRENT USE OF INSULIN (HCC): ICD-10-CM

## 2024-08-19 RX ORDER — METFORMIN HCL 500 MG
500 TABLET, EXTENDED RELEASE 24 HR ORAL
Qty: 90 TABLET | Refills: 1 | Status: SHIPPED | OUTPATIENT
Start: 2024-08-19 | End: 2025-02-15

## 2024-08-26 NOTE — TELEPHONE ENCOUNTER
Upon review of the In Basket request and the patient's chart, initial outreach has been made via telephone call to facility. Please see Contacts section for details.     Thank you  Brit Alonso

## 2024-08-26 NOTE — TELEPHONE ENCOUNTER
As a follow-up, a second attempt has been made for outreach via fax to facility. Please see Contacts section for details.    Thank you  Brit Alonso

## 2024-09-06 NOTE — TELEPHONE ENCOUNTER
09/06/24 10:58 AM     Chart reviewed for Diabetic Eye Exam was/were submitted to the patient's insurance.     Brit Alonso   [facility]

## 2024-09-06 NOTE — TELEPHONE ENCOUNTER
Upon review of the In Basket request we were able to locate, review, and update the patient chart as requested for Diabetic Eye Exam.    Any additional questions or concerns should be emailed to the Practice Liaisons via the appropriate education email address, please do not reply via In Basket.    Thank you  Brit Alonso   [facility]

## 2024-09-19 DIAGNOSIS — E78.49 OTHER HYPERLIPIDEMIA: ICD-10-CM

## 2024-09-19 DIAGNOSIS — E11.9 TYPE 2 DIABETES MELLITUS WITHOUT COMPLICATION, WITHOUT LONG-TERM CURRENT USE OF INSULIN (HCC): ICD-10-CM

## 2024-09-19 RX ORDER — ATORVASTATIN CALCIUM 10 MG/1
10 TABLET, FILM COATED ORAL DAILY
Qty: 90 TABLET | Refills: 1 | Status: SHIPPED | OUTPATIENT
Start: 2024-09-19

## 2024-10-08 DIAGNOSIS — I10 UNCONTROLLED STAGE 2 HYPERTENSION: ICD-10-CM

## 2024-10-09 RX ORDER — LOSARTAN POTASSIUM AND HYDROCHLOROTHIAZIDE 25; 100 MG/1; MG/1
1 TABLET ORAL DAILY
Qty: 90 TABLET | Refills: 1 | Status: SHIPPED | OUTPATIENT
Start: 2024-10-09

## 2024-11-19 DIAGNOSIS — I10 UNCONTROLLED STAGE 2 HYPERTENSION: ICD-10-CM

## 2024-11-19 DIAGNOSIS — E11.9 TYPE 2 DIABETES MELLITUS WITHOUT COMPLICATION, WITHOUT LONG-TERM CURRENT USE OF INSULIN (HCC): ICD-10-CM

## 2024-11-19 RX ORDER — AMLODIPINE BESYLATE 10 MG/1
10 TABLET ORAL DAILY
Qty: 90 TABLET | Refills: 1 | Status: SHIPPED | OUTPATIENT
Start: 2024-11-19

## 2024-11-20 RX ORDER — SITAGLIPTIN 100 MG/1
100 TABLET, FILM COATED ORAL DAILY
Qty: 30 TABLET | Refills: 5 | Status: SHIPPED | OUTPATIENT
Start: 2024-11-20

## 2024-11-20 RX ORDER — METFORMIN HYDROCHLORIDE 500 MG/1
TABLET, EXTENDED RELEASE ORAL
Qty: 90 TABLET | Refills: 1 | Status: SHIPPED | OUTPATIENT
Start: 2024-11-20

## 2024-11-20 RX ORDER — GLIPIZIDE 5 MG/1
5 TABLET ORAL
Qty: 30 TABLET | Refills: 5 | Status: SHIPPED | OUTPATIENT
Start: 2024-11-20

## 2024-12-12 ENCOUNTER — IMMUNIZATIONS (OUTPATIENT)
Dept: INTERNAL MEDICINE CLINIC | Age: 44
End: 2024-12-12
Payer: COMMERCIAL

## 2024-12-12 DIAGNOSIS — Z23 ENCOUNTER FOR IMMUNIZATION: Primary | ICD-10-CM

## 2024-12-12 DIAGNOSIS — Z23 NEED FOR INFLUENZA VACCINATION: ICD-10-CM

## 2024-12-12 PROCEDURE — 90656 IIV3 VACC NO PRSV 0.5 ML IM: CPT

## 2024-12-12 PROCEDURE — G0008 ADMIN INFLUENZA VIRUS VAC: HCPCS

## 2025-01-09 DIAGNOSIS — I10 UNCONTROLLED STAGE 2 HYPERTENSION: ICD-10-CM

## 2025-01-09 RX ORDER — LOSARTAN POTASSIUM AND HYDROCHLOROTHIAZIDE 25; 100 MG/1; MG/1
1 TABLET ORAL DAILY
Qty: 90 TABLET | Refills: 0 | Status: SHIPPED | OUTPATIENT
Start: 2025-01-09

## 2025-01-31 DIAGNOSIS — E11.9 TYPE 2 DIABETES MELLITUS WITHOUT COMPLICATION, WITHOUT LONG-TERM CURRENT USE OF INSULIN (HCC): ICD-10-CM

## 2025-01-31 NOTE — TELEPHONE ENCOUNTER
Pharmacy change.     Reason for call:   [x] Refill   [] Prior Auth  [] Other:     Office:   [x] PCP/Provider - Divine Villalobos DO   [] Specialty/Provider -     Medication:     sitaGLIPtin (Januvia) 100 mg tablet       Dose/Frequency:     100 mg, Oral, Daily       Quantity: 90    Pharmacy: [facility] [place] [address] [address]     Does the patient have enough for 3 days?   [x] Yes   [] No - Send as HP to POD

## 2025-02-13 ENCOUNTER — TELEPHONE (OUTPATIENT)
Dept: INTERNAL MEDICINE CLINIC | Age: 45
End: 2025-02-13

## 2025-02-13 NOTE — TELEPHONE ENCOUNTER
Left message for patient to call back the office to update his health ins information was coming back e rejected not active

## 2025-02-14 ENCOUNTER — OFFICE VISIT (OUTPATIENT)
Dept: INTERNAL MEDICINE CLINIC | Age: 45
End: 2025-02-14

## 2025-02-14 VITALS
WEIGHT: 272 LBS | SYSTOLIC BLOOD PRESSURE: 120 MMHG | HEIGHT: 69 IN | BODY MASS INDEX: 40.29 KG/M2 | DIASTOLIC BLOOD PRESSURE: 80 MMHG | TEMPERATURE: 97.3 F | HEART RATE: 59 BPM | OXYGEN SATURATION: 96 %

## 2025-02-14 DIAGNOSIS — Z00.00 ANNUAL PHYSICAL EXAM: ICD-10-CM

## 2025-02-14 DIAGNOSIS — E11.9 TYPE 2 DIABETES MELLITUS WITHOUT COMPLICATION, WITHOUT LONG-TERM CURRENT USE OF INSULIN (HCC): Primary | ICD-10-CM

## 2025-02-14 DIAGNOSIS — R31.29 OTHER MICROSCOPIC HEMATURIA: ICD-10-CM

## 2025-02-14 DIAGNOSIS — I10 ESSENTIAL HYPERTENSION: ICD-10-CM

## 2025-02-14 DIAGNOSIS — E78.49 OTHER HYPERLIPIDEMIA: ICD-10-CM

## 2025-02-14 DIAGNOSIS — R82.998 CRYSTALLURIA: ICD-10-CM

## 2025-02-14 PROBLEM — R74.01 TRANSAMINITIS: Status: RESOLVED | Noted: 2022-02-03 | Resolved: 2025-02-14

## 2025-02-14 LAB
CREAT UR-MCNC: 175 MG/DL
MICROALBUMIN UR-MCNC: 26.4 MG/L
MICROALBUMIN/CREAT 24H UR: 15 MG/G CREATININE (ref 0–30)
SL AMB POCT HEMOGLOBIN AIC: 6 (ref ?–6.5)

## 2025-02-14 PROCEDURE — 82570 ASSAY OF URINE CREATININE: CPT | Performed by: INTERNAL MEDICINE

## 2025-02-14 PROCEDURE — 82043 UR ALBUMIN QUANTITATIVE: CPT | Performed by: INTERNAL MEDICINE

## 2025-02-14 RX ORDER — ATORVASTATIN CALCIUM 10 MG/1
10 TABLET, FILM COATED ORAL DAILY
Qty: 90 TABLET | Refills: 1 | Status: SHIPPED | OUTPATIENT
Start: 2025-02-14

## 2025-02-14 NOTE — PROGRESS NOTES
Diabetic Foot Exam    Patient's shoes and socks removed.    Right Foot/Ankle   Right Foot Inspection  Skin Exam: skin normal, skin intact, dry skin, callus and callus. No warmth, no erythema, no maceration, no abnormal color, no pre-ulcer and no ulcer.     Toe Exam: ROM and strength within normal limits. No swelling, no tenderness, erythema and  no right toe deformity    Sensory   Proprioception: intact  Monofilament testing: intact    Vascular  The right DP pulse is 1+. The right PT pulse is 1+.     Left Foot/Ankle  Left Foot Inspection  Skin Exam: skin normal, skin intact, dry skin and callus. No warmth, no erythema, no maceration, normal color, no pre-ulcer and no ulcer.     Toe Exam: ROM and strength within normal limits. No swelling, no tenderness, no erythema and no left toe deformity.     Sensory   Proprioception: intact  Monofilament testing: intact    Vascular  The left DP pulse is 1+. The left PT pulse is 1+.     Assign Risk Category  No deformity present  No loss of protective sensation  No weak pulses  Risk: 0

## 2025-02-14 NOTE — ASSESSMENT & PLAN NOTE
- blood pressure is well controlled   -continue with amlodipine and losartan-hydrochlorothiazide  -continue with a low-salt diet and with exercise    Orders:  •  TSH, 3rd generation with Free T4 reflex; Future

## 2025-02-14 NOTE — ASSESSMENT & PLAN NOTE
-Stable  - continue  to keep well-hydrated  -We will order repeat urinalysis with microscopy and follow-up with the results  Orders:  •  Urinalysis with microscopic; Future

## 2025-02-14 NOTE — ASSESSMENT & PLAN NOTE
-Lipids are stable  -Continue with atorvastatin  -continue with a heart healthy diet  -continue with exercise    Orders:  •  atorvastatin (LIPITOR) 10 mg tablet; Take 1 tablet (10 mg total) by mouth daily

## 2025-02-14 NOTE — ASSESSMENT & PLAN NOTE
-Will order a CBC, CMP, TSH, lipid panel  -Follow-up in 6 months for an annual physical exam  Orders:  •  CBC and differential; Future  •  TSH, 3rd generation with Free T4 reflex; Future  •  Comprehensive metabolic panel; Future  •  Lipid panel; Future  •  Hemoglobin A1C; Future

## 2025-02-14 NOTE — ASSESSMENT & PLAN NOTE
- blood glucose is well controlled, point-of-care hemoglobin A1c was 6.0  -continue with glipizide, metformin, Januvia  -continue with a diabetic diet low in carbohydrates and simple sugars  -continue with exercise  - albumin/ creatinine ratio in urine was done today.  - diabetic foot exam was done today      Lab Results   Component Value Date    HGBA1C 6.0 02/14/2025       Orders:  •  Albumin / creatinine urine ratio; Future  •  atorvastatin (LIPITOR) 10 mg tablet; Take 1 tablet (10 mg total) by mouth daily  •  POCT hemoglobin A1c  •  Hemoglobin A1C; Future

## 2025-02-14 NOTE — ASSESSMENT & PLAN NOTE
-Stable  -Patient was counseled to keep well-hydrated  -  we will order a urinalysis with microscopy and follow-up with results  Orders:  •  Urinalysis with microscopic; Future

## 2025-02-14 NOTE — PROGRESS NOTES
Name: Russell Campbell      : 1980      MRN: [id number]  Encounter Provider: Divine Villalobos DO  Encounter Date: 2025   Encounter department: [facility]  :  Assessment & Plan  Type 2 diabetes mellitus without complication, without long-term current use of insulin (HCC)  - blood glucose is well controlled, point-of-care hemoglobin A1c was 6.0  -continue with glipizide, metformin, Januvia  -continue with a diabetic diet low in carbohydrates and simple sugars  -continue with exercise  - albumin/ creatinine ratio in urine was done today.  - diabetic foot exam was done today      Lab Results   Component Value Date    HGBA1C 6.0 2025       Orders:  •  Albumin / creatinine urine ratio; Future  •  atorvastatin (LIPITOR) 10 mg tablet; Take 1 tablet (10 mg total) by mouth daily  •  POCT hemoglobin A1c  •  Hemoglobin A1C; Future    Essential hypertension   - blood pressure is well controlled   -continue with amlodipine and losartan-hydrochlorothiazide  -continue with a low-salt diet and with exercise    Orders:  •  TSH, 3rd generation with Free T4 reflex; Future    Other hyperlipidemia  -Lipids are stable  -Continue with atorvastatin  -continue with a heart healthy diet  -continue with exercise    Orders:  •  atorvastatin (LIPITOR) 10 mg tablet; Take 1 tablet (10 mg total) by mouth daily    Crystalluria  -Stable  -Patient was counseled to keep well-hydrated  -  we will order a urinalysis with microscopy and follow-up with results  Orders:  •  Urinalysis with microscopic; Future    Other microscopic hematuria  -Stable  - continue  to keep well-hydrated  -We will order repeat urinalysis with microscopy and follow-up with the results  Orders:  •  Urinalysis with microscopic; Future    Annual physical exam  -Will order a CBC, CMP, TSH, lipid panel  -Follow-up in 6 months for an annual physical exam  Orders:  •  CBC and differential; Future  •  TSH, 3rd generation with  Free T4 reflex; Future  •  Comprehensive metabolic panel; Future  •  Lipid panel; Future  •  Hemoglobin A1C; Future    BMI 40.0-44.9, adult (HCC)    -BMI is 40.17  -Patient was counseled to diet and exercise              History of Present Illness       HPI    Patient presents for a follow-up visit regarding his diabetes mellitus type 2, hypertension, hyperlipidemia, hematuria, and transaminitis.  He states that he has been taking his medications as prescribed.  Today pt has no complaints.  Does not check bg at home anymore   He eats whatever he wants.  He denies any symptoms of dizziness, shakiness, nausea or sweatiness.  He states that he eats spaghetti, pizza, with the [place].  Has not been exercising so much cos of the cold   Used to walk more with the warm weather  He denies  any fever, chills, night sweats, headache, dizziness, nasal congestion, runny nose, pnd, sore throat, ear ache, sinus pain or pressure, wheezing, cough, chest pain, sob, palpitations, nausea, vomiting, diarrhea, constipation, hematochezia, hematuria, melena stools, arthralgias, myalgias, feelings of anxiety, depression or insomnia.        Review of Systems   Constitutional:  Negative for activity change, chills, fatigue, fever and unexpected weight change.   HENT:  Negative for ear pain, postnasal drip, rhinorrhea, sinus pressure and sore throat.    Eyes:  Negative for pain.   Respiratory:  Negative for cough, choking, chest tightness, shortness of breath and wheezing.    Cardiovascular:  Negative for chest pain, palpitations and leg swelling.   Gastrointestinal:  Negative for abdominal pain, constipation, diarrhea, nausea and vomiting.   Genitourinary:  Negative for dysuria and hematuria.   Musculoskeletal:  Negative for arthralgias, back pain, gait problem, joint swelling, myalgias and neck stiffness.   Skin:  Negative for pallor and rash.   Neurological:  Negative for dizziness, tremors, seizures, syncope, light-headedness and  "headaches.   Hematological:  Negative for adenopathy.   Psychiatric/Behavioral:  Negative for behavioral problems.        Objective   /80 (BP Location: Left arm, Patient Position: Sitting, Cuff Size: Large)   Pulse 59   Temp (!) 97.3 °F (36.3 °C) (Temporal)   Ht 5' 9\" (1.753 m)   Wt 123 kg (272 lb)   SpO2 96%   BMI 40.17 kg/m²      Physical Exam  Constitutional:       General: He is not in acute distress.     Appearance: He is well-developed. He is obese. He is not diaphoretic.      Comments: BMI is 40.17   HENT:      Head: Normocephalic and atraumatic.      Right Ear: External ear normal.      Left Ear: External ear normal.      Nose: Nose normal.      Mouth/Throat:      Mouth: Mucous membranes are dry.      Pharynx: No oropharyngeal exudate.      Comments: Dry mucous membranes  Eyes:      General: No scleral icterus.        Right eye: No discharge.         Left eye: No discharge.      Conjunctiva/sclera: Conjunctivae normal.      Pupils: Pupils are equal, round, and reactive to light.   Neck:      Thyroid: No thyromegaly.      Vascular: No JVD.      Trachea: No tracheal deviation.   Cardiovascular:      Rate and Rhythm: Normal rate and regular rhythm.      Heart sounds: Normal heart sounds. No murmur heard.     No friction rub. No gallop.   Pulmonary:      Effort: Pulmonary effort is normal. No respiratory distress.      Breath sounds: Normal breath sounds. No wheezing or rales.   Chest:      Chest wall: No tenderness.   Abdominal:      General: Bowel sounds are normal. There is no distension.      Palpations: Abdomen is soft. There is no mass.      Tenderness: There is no abdominal tenderness. There is no guarding or rebound.   Musculoskeletal:         General: No tenderness or deformity. Normal range of motion.      Cervical back: Normal range of motion and neck supple.      Right ankle: Swelling present.      Left ankle: Swelling (trace to 1+ pitting pedal edema b/l) present.      Right foot: " Swelling present.      Left foot: Swelling present.   Lymphadenopathy:      Cervical: No cervical adenopathy.   Skin:     General: Skin is warm and dry.      Coloration: Skin is not pale.      Findings: No erythema or rash.   Neurological:      Mental Status: He is alert and oriented to person, place, and time.      Cranial Nerves: No cranial nerve deficit.      Motor: No abnormal muscle tone.      Coordination: Coordination normal.      Deep Tendon Reflexes: Reflexes are normal and symmetric.   Psychiatric:         Behavior: Behavior normal.

## 2025-02-19 DIAGNOSIS — E11.9 TYPE 2 DIABETES MELLITUS WITHOUT COMPLICATION, WITHOUT LONG-TERM CURRENT USE OF INSULIN (HCC): ICD-10-CM

## 2025-02-19 DIAGNOSIS — I10 UNCONTROLLED STAGE 2 HYPERTENSION: ICD-10-CM

## 2025-02-20 RX ORDER — METFORMIN HYDROCHLORIDE 500 MG/1
500 TABLET, EXTENDED RELEASE ORAL
Qty: 90 TABLET | Refills: 1 | Status: SHIPPED | OUTPATIENT
Start: 2025-02-20

## 2025-02-20 RX ORDER — AMLODIPINE BESYLATE 10 MG/1
10 TABLET ORAL DAILY
Qty: 90 TABLET | Refills: 1 | Status: SHIPPED | OUTPATIENT
Start: 2025-02-20

## 2025-04-10 DIAGNOSIS — I10 UNCONTROLLED STAGE 2 HYPERTENSION: ICD-10-CM

## 2025-04-10 RX ORDER — LOSARTAN POTASSIUM AND HYDROCHLOROTHIAZIDE 25; 100 MG/1; MG/1
1 TABLET ORAL DAILY
Qty: 90 TABLET | Refills: 0 | Status: SHIPPED | OUTPATIENT
Start: 2025-04-10

## 2025-05-14 DIAGNOSIS — E11.9 TYPE 2 DIABETES MELLITUS WITHOUT COMPLICATION, WITHOUT LONG-TERM CURRENT USE OF INSULIN (HCC): ICD-10-CM

## 2025-05-14 DIAGNOSIS — E78.49 OTHER HYPERLIPIDEMIA: ICD-10-CM

## 2025-05-14 RX ORDER — ATORVASTATIN CALCIUM 10 MG/1
10 TABLET, FILM COATED ORAL DAILY
Qty: 90 TABLET | Refills: 0 | Status: SHIPPED | OUTPATIENT
Start: 2025-05-14

## 2025-05-19 DIAGNOSIS — I10 UNCONTROLLED STAGE 2 HYPERTENSION: ICD-10-CM

## 2025-05-19 RX ORDER — AMLODIPINE BESYLATE 10 MG/1
10 TABLET ORAL DAILY
Qty: 90 TABLET | Refills: 1 | Status: SHIPPED | OUTPATIENT
Start: 2025-05-19

## 2025-06-19 DIAGNOSIS — E11.9 TYPE 2 DIABETES MELLITUS WITHOUT COMPLICATION, WITHOUT LONG-TERM CURRENT USE OF INSULIN (HCC): ICD-10-CM

## 2025-06-19 RX ORDER — GLIPIZIDE 5 MG/1
5 TABLET ORAL
Qty: 30 TABLET | Refills: 0 | Status: SHIPPED | OUTPATIENT
Start: 2025-06-19

## 2025-07-10 DIAGNOSIS — I10 UNCONTROLLED STAGE 2 HYPERTENSION: ICD-10-CM

## 2025-07-11 RX ORDER — LOSARTAN POTASSIUM AND HYDROCHLOROTHIAZIDE 25; 100 MG/1; MG/1
1 TABLET ORAL DAILY
Qty: 90 TABLET | Refills: 0 | Status: SHIPPED | OUTPATIENT
Start: 2025-07-11

## 2025-07-19 DIAGNOSIS — E11.9 TYPE 2 DIABETES MELLITUS WITHOUT COMPLICATION, WITHOUT LONG-TERM CURRENT USE OF INSULIN (HCC): ICD-10-CM

## 2025-07-22 RX ORDER — GLIPIZIDE 5 MG/1
5 TABLET ORAL
Qty: 30 TABLET | Refills: 0 | Status: SHIPPED | OUTPATIENT
Start: 2025-07-22

## 2025-07-25 DIAGNOSIS — E11.9 TYPE 2 DIABETES MELLITUS WITHOUT COMPLICATION, WITHOUT LONG-TERM CURRENT USE OF INSULIN (HCC): ICD-10-CM

## 2025-07-25 DIAGNOSIS — E78.49 OTHER HYPERLIPIDEMIA: ICD-10-CM

## 2025-07-28 RX ORDER — ATORVASTATIN CALCIUM 10 MG/1
10 TABLET, FILM COATED ORAL DAILY
Qty: 90 TABLET | Refills: 0 | Status: SHIPPED | OUTPATIENT
Start: 2025-07-28

## 2025-08-12 ENCOUNTER — APPOINTMENT (OUTPATIENT)
Dept: LAB | Age: 45
End: 2025-08-12
Attending: INTERNAL MEDICINE
Payer: COMMERCIAL

## 2025-08-15 ENCOUNTER — OFFICE VISIT (OUTPATIENT)
Dept: INTERNAL MEDICINE CLINIC | Age: 45
End: 2025-08-15
Payer: COMMERCIAL

## 2025-08-19 ENCOUNTER — TELEPHONE (OUTPATIENT)
Age: 45
End: 2025-08-19

## 2025-08-19 DIAGNOSIS — I10 UNCONTROLLED STAGE 2 HYPERTENSION: ICD-10-CM

## 2025-08-19 DIAGNOSIS — E11.9 TYPE 2 DIABETES MELLITUS WITHOUT COMPLICATION, WITHOUT LONG-TERM CURRENT USE OF INSULIN (HCC): ICD-10-CM

## 2025-08-19 RX ORDER — METFORMIN HYDROCHLORIDE 500 MG/1
500 TABLET, EXTENDED RELEASE ORAL
Qty: 90 TABLET | Refills: 1 | Status: SHIPPED | OUTPATIENT
Start: 2025-08-19

## 2025-08-20 RX ORDER — AMLODIPINE BESYLATE 10 MG/1
10 TABLET ORAL DAILY
Qty: 90 TABLET | Refills: 1 | Status: SHIPPED | OUTPATIENT
Start: 2025-08-20

## 2025-08-21 DIAGNOSIS — E11.9 TYPE 2 DIABETES MELLITUS WITHOUT COMPLICATION, WITHOUT LONG-TERM CURRENT USE OF INSULIN (HCC): ICD-10-CM

## 2025-08-21 RX ORDER — GLIPIZIDE 5 MG/1
5 TABLET ORAL
Qty: 30 TABLET | Refills: 5 | Status: SHIPPED | OUTPATIENT
Start: 2025-08-21

## (undated) DEVICE — DISPOSABLE BRIEF/UNDERWEAR

## (undated) DEVICE — SPONGE STICK WITH PVP-I: Brand: KENDALL

## (undated) DEVICE — GAUZE SPONGES,16 PLY: Brand: CURITY

## (undated) DEVICE — POOLE SUCTION HANDLE: Brand: CARDINAL HEALTH

## (undated) DEVICE — GLOVE SRG BIOGEL 7.5

## (undated) DEVICE — IV CATH ANGIO 20G X 1.88

## (undated) DEVICE — BASIC SINGLE BASIN 2-LF: Brand: MEDLINE INDUSTRIES, INC.

## (undated) DEVICE — HYDROGEN PEROXIDE 3 PCT 16 OZ

## (undated) DEVICE — SUT VICRYL 3-0 SH 27 IN J416H

## (undated) DEVICE — 3M™ DURAPORE™ SURGICAL TAPE 1538-3, 3 INCH X 10 YARD (7,5CM X 9,1M), 4 ROLLS/BOX: Brand: 3M™ DURAPORE™

## (undated) DEVICE — ANTIBACTERIAL UNDYED BRAIDED (POLYGLACTIN 910), SYNTHETIC ABSORBABLE SUTURE: Brand: COATED VICRYL

## (undated) DEVICE — SUT VICRYL 4-0 SH 27 IN J415H

## (undated) DEVICE — SYRINGE 20ML LL

## (undated) DEVICE — GLOVE INDICATOR PI UNDERGLOVE SZ 7.5 BLUE

## (undated) DEVICE — BETHLEHEM UNIVERSAL MINOR GEN: Brand: CARDINAL HEALTH

## (undated) DEVICE — SUT VICRYL 3-0 REEL 54 IN J285G

## (undated) DEVICE — PLUMEPEN PRO 10FT

## (undated) DEVICE — 3000CC GUARDIAN II: Brand: GUARDIAN

## (undated) DEVICE — ALL PURPOSE SPONGES,NONWOVEN, 4 PLY: Brand: CURITY

## (undated) DEVICE — LUBRICANT SURGILUBE TUBE 4 OZ  FLIP TOP

## (undated) DEVICE — NEEDLE 25G X 1 1/2

## (undated) DEVICE — GOWN ,SIRUS ,NONREINFORCED 4XL: Brand: MEDLINE

## (undated) DEVICE — TUBING SUCTION 5MM X 12 FT

## (undated) DEVICE — SUT VICRYL PLUS 0 UR-6 27IN VCP603H

## (undated) DEVICE — SUT PDS PLUS 3-0 SH 27IN PDP316H

## (undated) DEVICE — PAD GROUNDING ADULT

## (undated) DEVICE — INTENDED FOR TISSUE SEPARATION, AND OTHER PROCEDURES THAT REQUIRE A SHARP SURGICAL BLADE TO PUNCTURE OR CUT.: Brand: BARD-PARKER SAFETY BLADES SIZE 15, STERILE

## (undated) DEVICE — NEEDLE HYPO 22G X 1-1/2 IN